# Patient Record
Sex: MALE | Race: WHITE | NOT HISPANIC OR LATINO | Employment: FULL TIME | URBAN - METROPOLITAN AREA
[De-identification: names, ages, dates, MRNs, and addresses within clinical notes are randomized per-mention and may not be internally consistent; named-entity substitution may affect disease eponyms.]

---

## 2023-08-15 PROBLEM — E11.9 DIABETES MELLITUS (MULTI): Status: ACTIVE | Noted: 2023-08-15

## 2023-08-15 PROBLEM — J44.9 CHRONIC OBSTRUCTIVE PULMONARY DISEASE (MULTI): Status: ACTIVE | Noted: 2023-08-15

## 2023-08-15 PROBLEM — G47.33 OSA TREATED WITH BIPAP: Status: ACTIVE | Noted: 2023-08-15

## 2023-08-15 PROBLEM — J45.909 ASTHMA (HHS-HCC): Status: ACTIVE | Noted: 2023-08-15

## 2023-08-15 PROBLEM — S29.9XXA RIB INJURY: Status: ACTIVE | Noted: 2023-08-15

## 2023-08-15 PROBLEM — S22.31XA RIGHT RIB FRACTURE: Status: ACTIVE | Noted: 2023-08-15

## 2023-08-15 PROBLEM — H53.2 DIPLOPIA: Status: ACTIVE | Noted: 2023-08-15

## 2023-08-15 PROBLEM — Z98.84 BARIATRIC SURGERY STATUS: Status: ACTIVE | Noted: 2023-08-15

## 2023-08-15 PROBLEM — H50.10 EXOTROPIA: Status: ACTIVE | Noted: 2023-08-15

## 2023-08-15 PROBLEM — S20.219A CONTUSION, CHEST WALL: Status: ACTIVE | Noted: 2023-08-15

## 2023-08-15 PROBLEM — H52.10 MYOPIA: Status: ACTIVE | Noted: 2023-08-15

## 2023-08-15 PROBLEM — H57.89 OCULAR INFLAMMATION: Status: ACTIVE | Noted: 2023-08-15

## 2023-08-15 PROBLEM — E78.5 HYPERLIPIDEMIA: Status: ACTIVE | Noted: 2023-08-15

## 2023-08-15 PROBLEM — I10 HTN (HYPERTENSION): Status: ACTIVE | Noted: 2023-08-15

## 2023-08-15 RX ORDER — CARBIDOPA AND LEVODOPA 25; 100 MG/1; MG/1
TABLET ORAL
COMMUNITY
Start: 2021-08-26 | End: 2023-10-05 | Stop reason: SDUPTHER

## 2023-08-15 RX ORDER — SILDENAFIL 100 MG/1
100 TABLET, FILM COATED ORAL AS NEEDED
COMMUNITY
Start: 2020-12-22

## 2023-08-15 RX ORDER — TRAZODONE HYDROCHLORIDE 100 MG/1
TABLET ORAL
COMMUNITY
Start: 2020-12-22 | End: 2024-02-19 | Stop reason: WASHOUT

## 2023-08-15 RX ORDER — FUROSEMIDE 40 MG/1
1 TABLET ORAL 2 TIMES DAILY
COMMUNITY
Start: 2018-01-09

## 2023-08-15 RX ORDER — ALBUTEROL SULFATE 90 UG/1
2 AEROSOL, METERED RESPIRATORY (INHALATION) EVERY 4 HOURS PRN
COMMUNITY
Start: 2020-12-03

## 2023-08-15 RX ORDER — ATORVASTATIN CALCIUM 10 MG/1
1 TABLET, FILM COATED ORAL NIGHTLY
COMMUNITY
Start: 2018-01-09

## 2023-08-15 RX ORDER — INSULIN GLARGINE 100 [IU]/ML
INJECTION, SOLUTION SUBCUTANEOUS
COMMUNITY
Start: 2018-01-09 | End: 2023-10-03

## 2023-08-15 RX ORDER — GLIPIZIDE 10 MG/1
1 TABLET ORAL 2 TIMES DAILY
COMMUNITY
Start: 2018-01-09

## 2023-08-15 RX ORDER — CITALOPRAM 40 MG/1
1 TABLET, FILM COATED ORAL DAILY
COMMUNITY
Start: 2018-01-09

## 2023-08-15 RX ORDER — AMLODIPINE BESYLATE 10 MG/1
1 TABLET ORAL DAILY
COMMUNITY
Start: 2018-01-09

## 2023-08-15 RX ORDER — LOSARTAN POTASSIUM 100 MG/1
100 TABLET ORAL DAILY
COMMUNITY
Start: 2020-12-18

## 2023-08-15 RX ORDER — MONTELUKAST SODIUM 10 MG/1
1 TABLET ORAL NIGHTLY
COMMUNITY
Start: 2018-01-09

## 2023-08-15 RX ORDER — FAMOTIDINE 40 MG/1
1 TABLET, FILM COATED ORAL DAILY
COMMUNITY
Start: 2018-01-09

## 2023-08-15 RX ORDER — ATENOLOL 100 MG/1
100 TABLET ORAL DAILY
COMMUNITY
Start: 2021-07-12

## 2023-08-15 RX ORDER — ERGOCALCIFEROL 1.25 MG/1
50000 CAPSULE ORAL
COMMUNITY
Start: 2021-05-17

## 2023-08-15 RX ORDER — FLUTICASONE PROPIONATE AND SALMETEROL 500; 50 UG/1; UG/1
1 POWDER RESPIRATORY (INHALATION)
COMMUNITY
Start: 2018-01-09

## 2023-08-15 RX ORDER — GABAPENTIN 300 MG/1
3 CAPSULE ORAL DAILY
COMMUNITY
Start: 2018-01-09 | End: 2023-10-03

## 2023-08-15 RX ORDER — GABAPENTIN 800 MG/1
800 TABLET ORAL 3 TIMES DAILY
COMMUNITY
Start: 2020-12-22

## 2023-08-15 RX ORDER — METFORMIN HYDROCHLORIDE 1000 MG/1
1 TABLET ORAL 2 TIMES DAILY
COMMUNITY
Start: 2018-01-09

## 2023-08-23 PROBLEM — Z98.84 BARIATRIC SURGERY STATUS: Status: RESOLVED | Noted: 2023-08-15 | Resolved: 2023-08-23

## 2023-08-23 PROBLEM — S29.9XXA RIB INJURY: Status: RESOLVED | Noted: 2023-08-15 | Resolved: 2023-08-23

## 2023-08-23 PROBLEM — R26.9 ABNORMAL GAIT: Status: ACTIVE | Noted: 2023-08-23

## 2023-08-23 PROBLEM — E66.01 CLASS 3 SEVERE OBESITY DUE TO EXCESS CALORIES WITH BODY MASS INDEX (BMI) OF 40.0 TO 44.9 IN ADULT (MULTI): Status: ACTIVE | Noted: 2023-08-23

## 2023-08-23 PROBLEM — G20.A1 PARKINSON DISEASE (MULTI): Status: ACTIVE | Noted: 2023-08-23

## 2023-10-03 ENCOUNTER — OFFICE VISIT (OUTPATIENT)
Dept: NEUROLOGY | Facility: CLINIC | Age: 63
End: 2023-10-03
Payer: COMMERCIAL

## 2023-10-03 VITALS
HEIGHT: 69 IN | HEART RATE: 91 BPM | WEIGHT: 290 LBS | BODY MASS INDEX: 42.95 KG/M2 | DIASTOLIC BLOOD PRESSURE: 72 MMHG | SYSTOLIC BLOOD PRESSURE: 118 MMHG

## 2023-10-03 DIAGNOSIS — G20.A1 PARKINSON'S DISEASE WITHOUT DYSKINESIA OR FLUCTUATING MANIFESTATIONS (MULTI): ICD-10-CM

## 2023-10-03 DIAGNOSIS — R41.89 COGNITIVE IMPAIRMENT: ICD-10-CM

## 2023-10-03 DIAGNOSIS — L21.9 DERMATITIS, SEBORRHEIC: ICD-10-CM

## 2023-10-03 DIAGNOSIS — G47.52 RBD (REM BEHAVIORAL DISORDER): Primary | ICD-10-CM

## 2023-10-03 PROCEDURE — 99205 OFFICE O/P NEW HI 60 MIN: CPT | Performed by: PSYCHIATRY & NEUROLOGY

## 2023-10-03 PROCEDURE — 3078F DIAST BP <80 MM HG: CPT | Performed by: PSYCHIATRY & NEUROLOGY

## 2023-10-03 PROCEDURE — 4010F ACE/ARB THERAPY RXD/TAKEN: CPT | Performed by: PSYCHIATRY & NEUROLOGY

## 2023-10-03 PROCEDURE — 3074F SYST BP LT 130 MM HG: CPT | Performed by: PSYCHIATRY & NEUROLOGY

## 2023-10-03 ASSESSMENT — UNIFIED PARKINSONS DISEASE RATING SCALE (UPDRS)
RIGIDITY_NECK: 2
POSTURAL_STABILITY: 1
POSTURE: 1
PRONATION_SUPINATION_RIGHT: 2
RIGIDITY_RLE: 0
RIGIDITY_LLE: 1
KINETIC_TREMOR_RIGHTHAND: 2
CLINICAL_STATE: ON
FACIAL_EXPRESSION: 1
AMPLITUDE_RUE: 1
GAIT: 2
TOETAPPING_LEFT: 2
CHAIR_RISING_SCALE: 1
HANDMOVEMENTS_RIGHT: 2
KINETIC_TREMOR_LEFTHAND: 2
AMPLITUDE_LIP_JAW: 0
RIGIDITY_RUE: 1
LEVODOPA: YES
AMPLITUDE_RLE: 0
AMPLITUDE_LLE: 0
PRONATION_SUPINATION_LEFT: 1
AMPLITUDE_LUE: 1
POSTURAL_TREMOR_RIGHTHAND: 1
MINUTES_SINCE_LEVODOPA: 240 MINS
RIGIDITY_LUE: 2
LEG_AGILITY_RIGHT: 2
PARKINSONS_MEDS: YES
LEG_AGILITY_LEFT: 2
CONSTANCY_TREMOR_ATREST: 2
SPEECH: 2
FINGER_TAPPING_LEFT: 1
SPONTANEITY_OF_MOVEMENT: 2
FINGER_TAPPING_RIGHT: 1
TOETAPPING_RIGHT: 3
FREEZING_GAIT: 0

## 2023-10-03 ASSESSMENT — MONTREAL COGNITIVE ASSESSMENT (MOCA)
10. [FLUENCY] NAME WORDS STARTING WITH DESIGNATED LETTER: 0
VISUOSPATIAL/EXECUTIVE SUBSCORE: 4
6. READ LIST OF DIGITS [FORWARD/BACKWARD]: 2
9. REPEAT EACH SENTENCE: 2
12. MEMORY INDEX SCORE: 2
WHAT IS THE TOTAL SCORE (OUT OF 30): 25
11. FOR EACH PAIR OF WORDS, WHAT CATEGORY DO THEY BELONG TO (OUT OF 2): 2
5. MEMORY TRIALS: 0
13. ORIENTATION SUBSCORE: 6
4. NAME EACH OF THE THREE ANIMALS SHOWN: 3
7. [VIGILENCE] TAP WHEN HEARING DESIGNATED LETTER: 1
WHAT LEVEL OF EDUCATION WAS ATTAINED: 0
8. SERIAL SUBTRACTION OF 7S: 3

## 2023-10-03 ASSESSMENT — PATIENT HEALTH QUESTIONNAIRE - PHQ9
1. LITTLE INTEREST OR PLEASURE IN DOING THINGS: SEVERAL DAYS
SUM OF ALL RESPONSES TO PHQ9 QUESTIONS 1 AND 2: 1
2. FEELING DOWN, DEPRESSED OR HOPELESS: NOT AT ALL
10. IF YOU CHECKED OFF ANY PROBLEMS, HOW DIFFICULT HAVE THESE PROBLEMS MADE IT FOR YOU TO DO YOUR WORK, TAKE CARE OF THINGS AT HOME, OR GET ALONG WITH OTHER PEOPLE: NOT DIFFICULT AT ALL

## 2023-10-03 ASSESSMENT — ENCOUNTER SYMPTOMS
LOSS OF SENSATION IN FEET: 1
DEPRESSION: 0
OCCASIONAL FEELINGS OF UNSTEADINESS: 1

## 2023-10-03 ASSESSMENT — PAIN SCALES - GENERAL: PAINLEVEL: 0-NO PAIN

## 2023-10-03 NOTE — PATIENT INSTRUCTIONS
It was nice to meet you today.       Plan:  REM sleep behavior disorder - melatonin 5 mg at bedtime. Make sure that bed area is linn  Parkinson;s disease:  continue carbidopa/levodopa at current dose as well as the Carbidopa/levodopa CR at bedtime.   Exercise as much as you can  Memory impairment - I have ordered blood work as well as an MRI of the brain  Neuropathy - continue Gabapentin.   Reduce alcohol intake - please start a mens multivitamin.   RTC in 6 months  Dermatology referral for seborrheic dermatitis.

## 2023-10-03 NOTE — LETTER
Initial Clinical Review    Age/Sex: 68 y o  male admitted on 2/20 for elective surgery - OR    Surgery Date: 2/20    Procedure: S/P TOTAL KNEE ARTHROPLASTY    Anesthesia: Choice    Admission Orders: Date/Time/Statement: Inpatient 2/20/19 @ 1655 Med Surg      Orders Placed This Encounter   Procedures    Inpatient Admission     Standing Status:   Standing     Number of Occurrences:   1     Order Specific Question:   Admitting Physician     Answer:   Vahe Jean [[id number]]     Order Specific Question:   Level of Care     Answer:   Med Surg [[id number]]     Order Specific Question:   Estimated length of stay     Answer:   More than 2 Midnights     Order Specific Question:   Certification     Answer:   I certify that inpatient services are medically necessary for this patient for a duration of greater than two midnights  See H&P and MD Progress Notes for additional information about the patient's course of treatment  Vital Signs: /61   Pulse 72   Temp 100 2 °F (37 9 °C)   Resp 18   Ht 5' 7" (1 702 m)   Wt 69 4 kg (153 lb)   SpO2 98%   BMI 23 96 kg/m²      Diet:        Diet Orders   (From admission, onward)            Start     Ordered    02/20/19 1523  Diet Regular; Regular House  Diet effective now     Question Answer Comment   Diet Type Regular    Regular Regular House    RD to adjust diet per protocol?  Yes        02/20/19 1522        Mobility: Up with assistance  PT eval and treat    DVT Prophylaxis: Sequential compression device    Scheduled Meds:  Current Facility-Administered Medications:  Cefazolin  Intravenous x2   acetaminophen 975 mg Oral Q8H [facility]   docusate sodium 100 mg Oral BID   enoxaparin 40 mg Subcutaneous Daily   gabapentin 100 mg Oral Q8H [facility]   pantoprazole 40 mg Oral Early Morning     Continuous Infusions:  lactated ringers 1 5 mL/kg/hr Last Rate: 1 5 mL/kg/hr (02/21/19 0252)     PRN Meds: calcium carbonate    morphine injection    ondansetron    oxyCODONE po x1 October 3, 2023     NORY Rivas MD  [address] [facility]  [address]  [facility] [address]    Patient: Gilberto Albarran   YOB: 1960   Date of Visit: 10/3/2023       Dear Dr. NORY Rivas MD:    Thank you for referring Gilberto Albarran to me for evaluation. Below are my notes for this consultation.  If you have questions, please do not hesitate to call me. I look forward to following your patient along with you.       Sincerely,     Ann Marie Dewitt MD      CC: No Recipients  ______________________________________________________________________________________    Subjective    Gilberto Albarran is a right handed  62 y.o. year old male who presents with Parkinson's Disease. Patient is accompanied by: spouse  Visit type: new patient visit     Parkinson's Disease    Here for eval of PD.   He developed a hand tremor, and was diagnosed  w PD by Dr Daily.   He was diagnosed 3-4 years ago, but wife thinks he may have had tremors for many many more years.  The tremor occurred both w rest and posture.  Wife notes a resting tremor.  He also has RBD, wife had to move out of the room,   He was started on C/L - it helped tremors and he also thinks he had less vivid dreams.     Meds:  Carbidopa/levodopa 25/100 one tab TID  Carbidopa/levodopa 25/100 CR at bedtime  Kick in: 45 mins, notices improved in tremor.   Wearing off: he does feel some wearing off, not sure exactly how long,   Dyskinesias: denies.     Prior medications:  none      Review of motor symptoms:  Tremor:  Location- bilateral UE                 Rest/postural/action- more so w action and posture, but does happen w rest as well.   Stiffness/rigidity:  Slowness: slow walk, short strides, generalized slowness.   Trouble walking: some gait initiation failure, takes multiple steps to turn. Has festinations.   Freezing of gait: this happens primarily after being seated for a whole.   Balance problems: mild  Falls: fell twice 2 months back.   Changes in  speech: denies - but wife says speech is slower. Mouth quivers.   Swallowing difficulties: denies  Handwriting: squiqqley  Activities of daily living (buttoning clothes, bathing, cutting food, etc): can do most activities, but needs some help putting meds on feet or put socks and shoes on.   Abnormal postures: denies.       Review of nonmotor symptoms:  Cognition:  Memory-  search for words sometimes. Wife says short term memory issues.          Hallucinations- when sitting watching tv -when wakes up - things had a ball of strings etc.  Has also had people walking into this room at night,          Obsessive/compulsive behaviors-  Mood:        He feels he is doing ok.  Often goes out to the bar at night,   Sleep disturbances including REM behavior disorder: + significant RBD  - wife had to move out of bedroom.  He has seen his dead dog.  Saw his wife walk in.   Sensory changes (ie, smell or taste):  taste is reduced. Uses a lot of garlic, thinks his smell sense is slightly reduced.   Constipation: denies  Urinary retention or frequency: denies  Dizziness upon standing: denies  Excessive saliva/drooling: denies  Fatigue: denies    He drinks 2-3 drinks and goes out maybe 3-4 times a week.       Patient Health Questionnaire-2 Score: 1          Review of Systems  All other system have been reviewed and are negative for complaint.  Objective  Seborrheic dermatitis of skin.     Neurological Exam  Mental Status  Awake, alert and oriented to person, place and time.    Cranial Nerves  CN II: Visual fields full to confrontation.  CN III, IV, VI: Extraocular movements intact bilaterally.  CN V: Facial sensation is normal.  CN VII: Full and symmetric facial movement.  CN VIII: Hearing is normal.  CN IX, X: Palate elevates symmetrically  CN XI: Shoulder shrug strength is normal.  CN XII: Tongue midline without atrophy or fasciculations.    Motor  Normal muscle bulk throughout. No fasciculations present. The following abnormal  movements were seen:    Sensory  Light touch is normal in upper and lower extremities.     Reflexes                                            Right                      Left  Brachioradialis                    1+                         1+  Biceps                                 1+                         1+  Triceps                                                        1+  Patellar                                0                         0  Achilles                                0                         0    Coordination  Right: Finger-to-nose normal. Rapid alternating movement normal.Left: Finger-to-nose normal. Rapid alternating movement normal.        MDS UPDRS 1st Score: Motor Examination  Is the patient on medication for treating the symptoms of Parkinson's Disease?: Yes  Patients receiving medication for treating the symptoms of Parkinson's Disease, maggy the patient's clinical state.: On  Is the patient on Levodopa?: Yes  Minutes since last Levodopa dose: 240 mins  Speech: 2  Facial Expression: 1  Rigidty Neck: 2  Rigidty RUE: 1  Rigidity - LUE: 2  Rigidity RLE: 0  Rigidity LLE: 1  Finger Tapping Right Hand: 1  Finger Tapping Left Hand: 1  Hand Movements- Right Hand: 2  Hand Movements- Left Hand: 1  Pronatiaon-Supination Movments - Right Hand: 2  Pronatiaon-Supination Movments Left Hand: 1  Toe Tapping Right Foot: 3  Toe Tapping - Left Foot: 2  Leg Agility - Right Le  Leg Agility - Left le  Arising from Chair: 1  Gait: 2  Freezing of Gait: 0  Postural Stability: 1  Posture: 1  Global Spontanteity of Movment ( Body Bradykinesia): 2  Postural Tremor - Right Hand: 1  Kinetic Tremor - Right hand: 2  Kinetic Tremor - Left hand: 2  Rest Tremor Amplitude - RUE: 1  Rest Tremor Amplitude - LUE: 1  Rest Tremor Amplitude - RLE: 0  Rest Tremor Amplitude - LLE: 0  Rest Tremor Amplitude - Lip/Jaw: 0  Constancy of Rest Tremor: 2    MDS UPDRS 2nd Score:         MOCA Total Score: 25                     Assessment/Plan  Problem List Items Addressed This Visit             ICD-10-CM       Neuro    Parkinson disease G20.A1    Relevant Orders    MR brain wo IV contrast    Vitamin B12    Folate    TSH with reflex to Free T4 if abnormal    Referral to Physical Therapy     Other Visit Diagnoses         Codes    RBD (REM behavioral disorder)    -  Primary G47.52    Cognitive impairment     R41.89    Relevant Orders    MR brain wo IV contrast    Vitamin B12    Folate    TSH with reflex to Free T4 if abnormal    Referral to Physical Therapy    Dermatitis, seborrheic     L21.9    Relevant Orders    Referral to Dermatology            Patient here for transfer of care for PD.   Overall his motoric symptoms are relatively well controlled, however he is having significant gait changes, this may also partly be due to his Peripheral vascular disease as well as peripheral edema, however would definitely benefit from physical therapy as well. MoCA on today;s exam was 25/30.       REM sleep behavior disorder - melatonin 5 mg at bedtime. Make sure that bed area is safe  Parkinson;s disease:  continue carbidopa/levodopa at current dose as well as the Carbidopa/levodopa CR at bedtime.   Exercise as much as you can  Memory impairment - I have ordered blood work as well as an MRI of the brain  Neuropathy - continue Gabapentin.   Reduce alcohol intake - please start a mens multivitamin.   RTC in 6 months  Dermatology referral for seborrheic dermatitis.

## 2023-10-03 NOTE — PROGRESS NOTES
Subjective     Gilberto Albarran is a right handed  62 y.o. year old male who presents with Parkinson's Disease. Patient is accompanied by: spouse  Visit type: new patient visit     Parkinson's Disease    Here for eval of PD.   He developed a hand tremor, and was diagnosed  w PD by Dr Daily.   He was diagnosed 3-4 years ago, but wife thinks he may have had tremors for many many more years.  The tremor occurred both w rest and posture.  Wife notes a resting tremor.  He also has RBD, wife had to move out of the room,   He was started on C/L - it helped tremors and he also thinks he had less vivid dreams.     Meds:  Carbidopa/levodopa 25/100 one tab TID  Carbidopa/levodopa 25/100 CR at bedtime  Kick in: 45 mins, notices improved in tremor.   Wearing off: he does feel some wearing off, not sure exactly how long,   Dyskinesias: denies.     Prior medications:  none      Review of motor symptoms:  Tremor:  Location- bilateral UE                 Rest/postural/action- more so w action and posture, but does happen w rest as well.   Stiffness/rigidity:  Slowness: slow walk, short strides, generalized slowness.   Trouble walking: some gait initiation failure, takes multiple steps to turn. Has festinations.   Freezing of gait: this happens primarily after being seated for a whole.   Balance problems: mild  Falls: fell twice 2 months back.   Changes in speech: denies - but wife says speech is slower. Mouth quivers.   Swallowing difficulties: denies  Handwriting: squiqqley  Activities of daily living (buttoning clothes, bathing, cutting food, etc): can do most activities, but needs some help putting meds on feet or put socks and shoes on.   Abnormal postures: denies.       Review of nonmotor symptoms:  Cognition:  Memory-  search for words sometimes. Wife says short term memory issues.          Hallucinations- when sitting watching tv -when wakes up - things had a ball of strings etc.  Has also had people walking into this room at  night,          Obsessive/compulsive behaviors-  Mood:        He feels he is doing ok.  Often goes out to the bar at night,   Sleep disturbances including REM behavior disorder: + significant RBD  - wife had to move out of bedroom.  He has seen his dead dog.  Saw his wife walk in.   Sensory changes (ie, smell or taste):  taste is reduced. Uses a lot of garlic, thinks his smell sense is slightly reduced.   Constipation: denies  Urinary retention or frequency: denies  Dizziness upon standing: denies  Excessive saliva/drooling: denies  Fatigue: denies    He drinks 2-3 drinks and goes out maybe 3-4 times a week.       Patient Health Questionnaire-2 Score: 1          Review of Systems  All other system have been reviewed and are negative for complaint.  Objective   Seborrheic dermatitis of skin.     Neurological Exam  Mental Status  Awake, alert and oriented to person, place and time.    Cranial Nerves  CN II: Visual fields full to confrontation.  CN III, IV, VI: Extraocular movements intact bilaterally.  CN V: Facial sensation is normal.  CN VII: Full and symmetric facial movement.  CN VIII: Hearing is normal.  CN IX, X: Palate elevates symmetrically  CN XI: Shoulder shrug strength is normal.  CN XII: Tongue midline without atrophy or fasciculations.    Motor  Normal muscle bulk throughout. No fasciculations present. The following abnormal movements were seen:    Sensory  Light touch is normal in upper and lower extremities.     Reflexes                                            Right                      Left  Brachioradialis                    1+                         1+  Biceps                                 1+                         1+  Triceps                                                        1+  Patellar                                0                         0  Achilles                                0                         0    Coordination  Right: Finger-to-nose normal. Rapid alternating movement  normal.Left: Finger-to-nose normal. Rapid alternating movement normal.        MDS UPDRS 1st Score: Motor Examination  Is the patient on medication for treating the symptoms of Parkinson's Disease?: Yes  Patients receiving medication for treating the symptoms of Parkinson's Disease, maggy the patient's clinical state.: On  Is the patient on Levodopa?: Yes  Minutes since last Levodopa dose: 240 mins  Speech: 2  Facial Expression: 1  Rigidty Neck: 2  Rigidty RUE: 1  Rigidity - LUE: 2  Rigidity RLE: 0  Rigidity LLE: 1  Finger Tapping Right Hand: 1  Finger Tapping Left Hand: 1  Hand Movements- Right Hand: 2  Hand Movements- Left Hand: 1  Pronatiaon-Supination Movments - Right Hand: 2  Pronatiaon-Supination Movments Left Hand: 1  Toe Tapping Right Foot: 3  Toe Tapping - Left Foot: 2  Leg Agility - Right Le  Leg Agility - Left le  Arising from Chair: 1  Gait: 2  Freezing of Gait: 0  Postural Stability: 1  Posture: 1  Global Spontanteity of Movment ( Body Bradykinesia): 2  Postural Tremor - Right Hand: 1  Kinetic Tremor - Right hand: 2  Kinetic Tremor - Left hand: 2  Rest Tremor Amplitude - RUE: 1  Rest Tremor Amplitude - LUE: 1  Rest Tremor Amplitude - RLE: 0  Rest Tremor Amplitude - LLE: 0  Rest Tremor Amplitude - Lip/Jaw: 0  Constancy of Rest Tremor: 2    MDS UPDRS 2nd Score:         MOCA Total Score: 25                    Assessment/Plan   Problem List Items Addressed This Visit             ICD-10-CM       Neuro    Parkinson disease G20.A1    Relevant Orders    MR brain wo IV contrast    Vitamin B12    Folate    TSH with reflex to Free T4 if abnormal    Referral to Physical Therapy     Other Visit Diagnoses         Codes    RBD (REM behavioral disorder)    -  Primary G47.52    Cognitive impairment     R41.89    Relevant Orders    MR brain wo IV contrast    Vitamin B12    Folate    TSH with reflex to Free T4 if abnormal    Referral to Physical Therapy    Dermatitis, seborrheic     L21.9    Relevant Orders     Referral to Dermatology            Patient here for transfer of care for PD.   Overall his motoric symptoms are relatively well controlled, however he is having significant gait changes, this may also partly be due to his Peripheral vascular disease as well as peripheral edema, however would definitely benefit from physical therapy as well. MoCA on today;s exam was 25/30.       REM sleep behavior disorder - melatonin 5 mg at bedtime. Make sure that bed area is safe  Parkinson;s disease:  continue carbidopa/levodopa at current dose as well as the Carbidopa/levodopa CR at bedtime.   Exercise as much as you can  Memory impairment - I have ordered blood work as well as an MRI of the brain  Neuropathy - continue Gabapentin.   Reduce alcohol intake - please start a mens multivitamin.   RTC in 6 months  Dermatology referral for seborrheic dermatitis.

## 2023-10-04 ENCOUNTER — OFFICE VISIT (OUTPATIENT)
Dept: WOUND CARE | Facility: CLINIC | Age: 63
End: 2023-10-04
Payer: COMMERCIAL

## 2023-10-04 PROCEDURE — 99212 OFFICE O/P EST SF 10 MIN: CPT

## 2023-10-05 RX ORDER — CARBIDOPA AND LEVODOPA 25; 100 MG/1; MG/1
1 TABLET ORAL 3 TIMES DAILY
Qty: 90 TABLET | Refills: 2 | Status: SHIPPED | OUTPATIENT
Start: 2023-10-05 | End: 2023-12-19

## 2023-10-05 RX ORDER — CARBIDOPA AND LEVODOPA 25; 100 MG/1; MG/1
1 TABLET, EXTENDED RELEASE ORAL NIGHTLY
Qty: 90 TABLET | Refills: 2 | Status: SHIPPED | OUTPATIENT
Start: 2023-10-05 | End: 2023-12-19 | Stop reason: SDUPTHER

## 2023-10-25 ENCOUNTER — LAB (OUTPATIENT)
Dept: LAB | Facility: LAB | Age: 63
End: 2023-10-25
Payer: COMMERCIAL

## 2023-10-25 ENCOUNTER — ANCILLARY PROCEDURE (OUTPATIENT)
Dept: RADIOLOGY | Facility: CLINIC | Age: 63
End: 2023-10-25
Payer: COMMERCIAL

## 2023-10-25 DIAGNOSIS — G20.A1 PARKINSON'S DISEASE WITHOUT DYSKINESIA OR FLUCTUATING MANIFESTATIONS (MULTI): ICD-10-CM

## 2023-10-25 DIAGNOSIS — R41.89 COGNITIVE IMPAIRMENT: ICD-10-CM

## 2023-10-25 LAB
FOLATE SERPL-MCNC: 5.2 NG/ML
TSH SERPL-ACNC: 0.97 MIU/L (ref 0.44–3.98)
VIT B12 SERPL-MCNC: 116 PG/ML (ref 211–911)

## 2023-10-25 PROCEDURE — 70551 MRI BRAIN STEM W/O DYE: CPT

## 2023-10-25 PROCEDURE — 84443 ASSAY THYROID STIM HORMONE: CPT

## 2023-10-25 PROCEDURE — 70551 MRI BRAIN STEM W/O DYE: CPT | Performed by: RADIOLOGY

## 2023-10-25 PROCEDURE — 82746 ASSAY OF FOLIC ACID SERUM: CPT

## 2023-10-25 PROCEDURE — 82607 VITAMIN B-12: CPT

## 2023-10-25 PROCEDURE — 36415 COLL VENOUS BLD VENIPUNCTURE: CPT

## 2023-10-30 ENCOUNTER — TELEPHONE (OUTPATIENT)
Dept: NEUROLOGY | Facility: CLINIC | Age: 63
End: 2023-10-30
Payer: COMMERCIAL

## 2023-10-30 NOTE — TELEPHONE ENCOUNTER
Called Back- No Answer, Left detailed messageCalled Back- No Answer, Left detailed message to make an appt w PCP for low B12 and supplemental shot- followed up with oral supplments- get rechecked w PCP in 6 months.

## 2023-11-03 ENCOUNTER — TELEPHONE (OUTPATIENT)
Dept: NEUROLOGY | Facility: HOSPITAL | Age: 63
End: 2023-11-03
Payer: COMMERCIAL

## 2023-11-03 NOTE — TELEPHONE ENCOUNTER
Called pt back to discuss MRI brain.   No answer, left message to call office back but that no urgent findings

## 2023-11-06 ENCOUNTER — TELEPHONE (OUTPATIENT)
Dept: NEUROLOGY | Facility: CLINIC | Age: 63
End: 2023-11-06
Payer: COMMERCIAL

## 2023-11-09 ENCOUNTER — DOCUMENTATION (OUTPATIENT)
Dept: NEUROLOGY | Facility: HOSPITAL | Age: 63
End: 2023-11-09
Payer: COMMERCIAL

## 2023-11-09 DIAGNOSIS — Q27.9 VASCULAR MALFORMATION (HHS-HCC): Primary | ICD-10-CM

## 2023-11-09 NOTE — PROGRESS NOTES
Called pt back/  Relayed to him that the MRI:  IMPRESSION  * There is no evidence of intracranial mass, cerebral infarction or  hemorrhage. 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   Recommended referral to ENT.   Will place same.

## 2023-11-16 ENCOUNTER — EVALUATION (OUTPATIENT)
Dept: PHYSICAL THERAPY | Facility: CLINIC | Age: 63
End: 2023-11-16
Payer: COMMERCIAL

## 2023-11-16 DIAGNOSIS — G20.A1 PARKINSON'S DISEASE WITHOUT DYSKINESIA OR FLUCTUATING MANIFESTATIONS (MULTI): ICD-10-CM

## 2023-11-16 DIAGNOSIS — R26.89 IMBALANCE: Primary | ICD-10-CM

## 2023-11-16 DIAGNOSIS — R41.89 COGNITIVE IMPAIRMENT: ICD-10-CM

## 2023-11-16 DIAGNOSIS — Z91.81 AT RISK FOR FALLS: ICD-10-CM

## 2023-11-16 PROCEDURE — 97162 PT EVAL MOD COMPLEX 30 MIN: CPT | Mod: GP

## 2023-11-16 PROCEDURE — 97112 NEUROMUSCULAR REEDUCATION: CPT | Mod: GP

## 2023-11-16 ASSESSMENT — ENCOUNTER SYMPTOMS
OCCASIONAL FEELINGS OF UNSTEADINESS: 1
DEPRESSION: 0
LOSS OF SENSATION IN FEET: 1

## 2023-11-16 NOTE — PROGRESS NOTES
Physical Therapy Initial Evaluation:  Vestibular and Balance      Patient Name:  Gilberto Albarran   MRN:  [id number]   Date of Evaluation:  11/16/23   Time Calculation  Start Time: 0817  Stop Time: 0905  Time Calculation (min): 48 min  Visit Number:  1  Insurance Information:  20 COPAY, 1000 DED (MET) 6850 OOP MAX, 30 VS LESLIE YR,NO AUTH     1. Imbalance        2. Parkinson's disease without dyskinesia or fluctuating manifestations  Referral to Physical Therapy    Follow Up In Physical Therapy      3. Cognitive impairment  Referral to Physical Therapy    Follow Up In Physical Therapy      4. At risk for falls            Assessment: Gilberto Albarran is a 63 year old M referred to outpatient PT for Parkinson's related imbalance. Pt demonstrates impairments in sensation, strength, dynamic balance during gait and difficulty w/righting reactions after LOB. Pt subjectively reports a fear of falling d/t past injuries. Pt demonstrates resting, postural and intention tremors, questionable rigidity w/cogwheeling, and bradykinesia during rapid alternative mvmnts and ambulation. These impairments decrease pts ability to perform functional mobility, and puts him at an increased risk of falling. Based on patient's examination, concurrent co-morbidities, and presentation, patient's level of complexity is Moderate.  As a result, recommending continued PT services to facilitate improvement in CLOF.     Problems include:  Activity limitations, Aerobic capacity / endurance, Balance, Decreased functional level, Decreased knowledge of HEP, Fall risk, Gait / locomotion, Motor function / tone , Pain, Participation restrictions, ROM, Sensory, Strength, and Transfers    Goals:  By Discharge patient will demo:  [place] in HEP  No falls during POC  Improvement in the following outcome measures to decrease risk of falls:  Improve 5xSTS <15sec w/out UE  Improve MiniBESS by 5.5pts consistent w/MDC and to decrease risk of falling.   Demonstrate  ability to fwd step onto an 8in step w/out UE support.   Subjective improvement in ability to rise from a chair, and rise from bathroom toilet.  Subjective improvement in ability to step out of a high tub.   Subjective improvement in freezing gait after sitting for prolonged periods of time.       Potential to achieve rehab goals is fair.    Plan:  2 times every week for a total of 10 visits.  Re-assessment after that time.    Activities that may be performed include but are not limited to:  balance, gait, transfers, modalities (as appropriate), e-stim (as appropriate), canalith repositioning maneuvers, therapeutic exercise, therapeutic activities.    Gilberto Albarran in agreement with and understanding of all discussed this date.    ----------------------------------  ----------------------------------    Subjective:    Onset Date:  Diagnosed 3-4 years ago. Had issues for a while, falling for 5-6 years.     HPI:  Gilberto Albarran is a 63 year old M referred to outpatient PT for Parkinson's related imbalance.   Pt reports he has PD, and they want to work on balance and walking. Pt reports he tends to fall a lot, and requires something nearby or requires help from someone to get up from falls.  Pt reports he fell a couple days ago while stepping onto curb. Pt reports difficulty walking over unsteady surfaces. Pt reports additionally having R hip, and bilateral knee pain which he believes is likely d/t arthritis. Pt reports he lives at home w/spouse. 5 steps to get in handrail on R side. House w/second floor and basement stairs. Pt reports significant difficulty w/stairs, L knee occasionally oli, and performs step too pattern to perform. Pt reports no handrail going upstairs, but handrail going down. Pt wife works currently, pt retired for 6 years. Pt reports additional difficulty getting in and out of tub w/out grab bars, as well as getting off toilet and up from chairs. Pt reports approx 2 years ago fell and injured R  "RTC which severely limits ROM and strength. Pt reports requires something nearby or requires help from someone to get up from falls. Pt denies current use of assistive device. Pt reports difficulty standing and walking after sitting for prolonged periods of time.     Patient Goal:  Better balance, stairs if that's possible, just overall better.      (note:  \"y\" = yes; \"n\" = no)      Precautions: Increased risk of falling.     [place] Fall Risk  One or more falls in the last year? Yes  How many Times? 2 or more  Was the patient injured in the fall? Yes  Has trouble stepping onto curb? Yes  Advised to use a cane or walker to get around safely? Yes  Often has to rush to toilet? No  Feels unsteady when walking? Yes  Has lost some feeling in feet? Yes  Often feels sad or depressed? No  Steadies self on furniture while walking at home? Yes  Takes medicine that makes them feel lightheaded or more tired than usual? No  Worried about Falling? Yes  Takes medicine to sleep or improve mood? Yes  Needs to push with hands when rising from a chair? Yes        Pain:  Pt reports bilateral knee pain, and R hip pain. Pt did not quantify.     ----------------------------------  ----------------------------------    OBJECTIVE    Observation:   -Sensation: Impaired bilaterally distally from toes to med/lateral malleoli.     KITTY's:  -Finger Taps: Demonstrates decrease in pace and amplitude  -Pron/Supin: Demonstrates decrease in pace and amplitude   -Heel Taps: Demonstrates decrease in pace and amplitude    -Harish shoulder flexion Isometric hold: Demonstrates postural tremor    ROM:   Rigidity PROM:  -Elbow flex/ext: Bilaterally demonstrates min rigidity w/cogwheeling, questionable d/t pt intermittent difficulty relaxing to allow PROM.    -Knee flex/ext: Bilaterally demonstrates min rigidity w/cogwheeling, questionable d/t pt intermittent difficulty relaxing to allow PROM.        Strength:   LE Strength Screening:   RLE LLE   Hip Flex 4/5 4/5 "   Hip ABD 4/5 4/5   Hip ADD 4/5 4/5   Knee Ext 4/5 4/5   Knee Flex 4-/5 4-/5   Ankle DF 4-/5 4/5   Ankle PF 4+/5 4+/5     Coordination:   -Finger to nose EO: Intention tremor bilaterally R>L  -Alt UE nose taps EC: Demonstrates normal coordination     Functional Mobility Assessment:  - Gait: Pt ambulates w/decreased step length, decrease MAICO, and demonstrates mild unsteadiness.   - Transfers:  Pt demonstrates decreased balance and LE strength increasing difficulty performing STS transfers.   - Bed Mobility: Not observed      Outcomes:  5xSTS: 18sec w/out UE, ABC:  59.4%, and MiniBESS: 18/28 (most difficulty w/dynamic gait tasks, and requiring several steps for righting reactions)    TREATMENT:    Neuromuscular Re-education (97280): 10 minutes  -Discussion of PD related changes and interventions to target difficulties.   -Discussion of importance of aerobic exercise  HEP:  -STS 3x10 daily  -STS w/immediate fwd/bkwd stepping 3x5ea side. Daily   -Standing counter Marching w/large amplitude marching: x10ea side daily  ^educated in pace, performance and purpose. Verbalized understanding.

## 2023-11-21 ENCOUNTER — TREATMENT (OUTPATIENT)
Dept: PHYSICAL THERAPY | Facility: CLINIC | Age: 63
End: 2023-11-21
Payer: COMMERCIAL

## 2023-11-21 DIAGNOSIS — R41.89 COGNITIVE IMPAIRMENT: ICD-10-CM

## 2023-11-21 DIAGNOSIS — G20.A1 PARKINSON'S DISEASE WITHOUT DYSKINESIA OR FLUCTUATING MANIFESTATIONS (MULTI): ICD-10-CM

## 2023-11-21 DIAGNOSIS — Z91.81 AT RISK FOR FALLS: ICD-10-CM

## 2023-11-21 DIAGNOSIS — R26.89 IMBALANCE: Primary | ICD-10-CM

## 2023-11-21 PROCEDURE — 97112 NEUROMUSCULAR REEDUCATION: CPT | Mod: GP

## 2023-11-21 NOTE — PROGRESS NOTES
Physical Therapy Treatment      Patient Name: Gilberto Albarran  MRN: [id number]  Today's Date: 11/21/2023  Visit #: 2  Insurance:  20 COPAY, 1000 DED (MET) 6850 OOP MAX, 30 VS LESLIE YR,NO AUTH     Time Calculation  Start Time: 1032  Stop Time: 1116  Time Calculation (min): 44 min    1. Imbalance        2. Parkinson's disease without dyskinesia or fluctuating manifestations  Follow Up In Physical Therapy      3. Cognitive impairment  Follow Up In Physical Therapy      4. At risk for falls            ASSESSMENT:  Pt highly challenged by movement initiation and unsteadiness during transfers armaan after STS. Pt demonstrates difficulty w/step length, foot clearance and general steadiness during dynamic gait tasks. Lastly, pt highly challenged by step ups armaan w/LLE d/t decreased strength and balance, relying heavily on UE support. Pt to continue to benefit from skilled PT to improve functional mobility and decrease risk of falling.       GOALS:  By Discharge patient will demo:  [place] in HEP  No falls during POC  Improvement in the following outcome measures to decrease risk of falls:  Improve 5xSTS <15sec w/out UE  Improve MiniBESS by 5.5pts consistent w/MDC and to decrease risk of falling.   Demonstrate ability to fwd step onto an 8in step w/out UE support.   Subjective improvement in ability to rise from a chair, and rise from bathroom toilet.  Subjective improvement in ability to step out of a high tub.   Subjective improvement in freezing gait after sitting for prolonged periods of time.     PLAN:  Cont to progress LE strength/NM control, dynamic balance, and movement initiation after STS as tolerated.    -Add resisted TKE  -Try step ups on 2in step for LLE.       SUBJECTIVE:  Pt reports if he has been sitting for 15min or more he continues to have significant difficulty taking a step right away. Pt reports other exercises have been going well.     OBJECTIVE:  TREATMENT:  Neuromuscular Re-education (49472): 44  minutes  TUG trials through fig 8 cones. For LE transfer strength, and to work on step initiation    ^performed to fatigue - approx 5 reps  STS w/immediate stepping to cone for visual cue, - to work on step initiation and balance during transfers - 5x w/ea LE step, pt highly challenged and experiencing knee pain/fatigue by end of repetitions.  4 Hurdles layed flat, to improve step length and foot clearance to translate to functional mobility, step too pattern - x3ea side.   4 Hurdles layed flat, to improve step length and foot clearance to translate to functional mobility, reciprocal pattern - to fatigue (approx 5)  Step ups on 4in step RLE w/out UE support to challenge balance and NM re-ed of LE  Step ups on 4in step LLE for NM re-ed and LE stability   ^1st set w/bilateral UE support, pt relies heavily on UEs. - x8   ^2nd set w/bilateral UE support ascend, RUE only descend x3, stopped d/t knee buckling.   -LAQ LLE 3-5sec isometric hold, for NM re-ed of quads and LE strength - x8     Discussion of HEP and added to HEP:  -Discussion of adding visual cue for fwd step initiation exercise.   -Discussion of performing marching slowly.  -Added LAQ 3-5sec isometric hold 3x10 daily.

## 2023-11-28 ENCOUNTER — TREATMENT (OUTPATIENT)
Dept: PHYSICAL THERAPY | Facility: CLINIC | Age: 63
End: 2023-11-28
Payer: COMMERCIAL

## 2023-11-28 DIAGNOSIS — R41.89 COGNITIVE IMPAIRMENT: ICD-10-CM

## 2023-11-28 DIAGNOSIS — G20.A1 PARKINSON'S DISEASE WITHOUT DYSKINESIA OR FLUCTUATING MANIFESTATIONS (MULTI): ICD-10-CM

## 2023-11-28 DIAGNOSIS — Z91.81 AT RISK FOR FALLS: ICD-10-CM

## 2023-11-28 DIAGNOSIS — R26.89 IMBALANCE: Primary | ICD-10-CM

## 2023-11-28 PROCEDURE — 97112 NEUROMUSCULAR REEDUCATION: CPT | Mod: GP,CQ

## 2023-11-28 NOTE — PROGRESS NOTES
Physical Therapy Treatment      Patient Name: Gilberto Albarran  MRN: [id number]  Today's Date: 11/28/2023  Visit #: 3  Insurance:  20 COPAY, 1000 DED (MET) 6850 OOP MAX, 30 VS LESLIE YR,NO AUTH     Time Calculation  Start Time: 1519  Stop Time: 1600  Time Calculation (min): 41 min    1. Imbalance        2. Parkinson's disease without dyskinesia or fluctuating manifestations  Follow Up In Physical Therapy      3. Cognitive impairment  Follow Up In Physical Therapy      4. At risk for falls              ASSESSMENT:  Pt tolerated session well with focus on dynamic balance and strengthening especially left LE.  Pt. Did well with 2 inch step ups with some UE support needed.  Fatigued with walking 240' especially noted in left LE.  Pt. Reported fatigue in legs post session.  Pt. Motivated with plan of care and will con't  to benefit from skilled PT to improve functional mobility and decrease risk of falling.       GOALS:  By Discharge patient will demo:  [place] in HEP  No falls during POC  Improvement in the following outcome measures to decrease risk of falls:  Improve 5xSTS <15sec w/out UE  Improve MiniBESS by 5.5pts consistent w/MDC and to decrease risk of falling.   Demonstrate ability to fwd step onto an 8in step w/out UE support.   Subjective improvement in ability to rise from a chair, and rise from bathroom toilet.  Subjective improvement in ability to step out of a high tub.   Subjective improvement in freezing gait after sitting for prolonged periods of time.     PLAN:  Cont to progress LE strength/NM control, dynamic balance, and movement initiation after STS as tolerated.      SUBJECTIVE:  Pt reports compliance with HEP. Voices no complaints.    OBJECTIVE:  TREATMENT:  Neuromuscular Re-education (87245): 41 minutes  - reviewed and performed LAQ LLE 3-5sec isometric hold, for NM re-ed of quads and LE strength - x 10 reps both le's.  -sit to stands x 10 reps without UE support.  -ambulated on the track 240',  required to sit at this time due to fatigue and left knee discomfort.   -forward stepping to target x 10 reps with intermittent UE support.  -heel/toe rocking with arm swing   -2 inch forward step ups both LE's x 10 reps  -TKE with ball against wall with reps to tolerance left LE.

## 2023-12-04 ENCOUNTER — TREATMENT (OUTPATIENT)
Dept: PHYSICAL THERAPY | Facility: CLINIC | Age: 63
End: 2023-12-04
Payer: COMMERCIAL

## 2023-12-04 DIAGNOSIS — G20.A1 PARKINSON'S DISEASE WITHOUT DYSKINESIA OR FLUCTUATING MANIFESTATIONS (MULTI): ICD-10-CM

## 2023-12-04 DIAGNOSIS — R26.89 IMBALANCE: Primary | ICD-10-CM

## 2023-12-04 DIAGNOSIS — R41.89 COGNITIVE IMPAIRMENT: ICD-10-CM

## 2023-12-04 DIAGNOSIS — Z91.81 AT RISK FOR FALLS: ICD-10-CM

## 2023-12-04 PROCEDURE — 97112 NEUROMUSCULAR REEDUCATION: CPT | Mod: GP | Performed by: PHYSICAL THERAPIST

## 2023-12-04 NOTE — PROGRESS NOTES
"Physical Therapy Treatment      Patient Name: Gilberto Albarran  MRN: [id number]  Today's Date: 12/4/2023  Visit #: 4  Insurance:  20 COPAY, 1000 DED (MET) 6850 OOP MAX, 30 VS LESLIE YR,NO AUTH     Time Calculation  Start Time: 1026  Stop Time: 1046  Time Calculation (min): 20 min    1. Imbalance        2. Parkinson's disease without dyskinesia or fluctuating manifestations  Follow Up In Physical Therapy      3. Cognitive impairment  Follow Up In Physical Therapy      4. At risk for falls          ASSESSMENT:  Patient arrived to clinic ~25 min late leading to abbreviated session. Patient tolerated session well w/ only minor LOB consistent w/ balance training performed. Patient w/ good activation of quadriceps musculature and no evidence of L knee instability throughout any intervention. Able to progress step-up exercise to 4\" box w/ no difficulty and no LOB noted. Patient w/ min sway during dynamic balance challenge, able to self-correct LOB requiring intermittent UE support. Some stepping strategies noted, although not of adequate amplitude or speed to facilitate true recovery of postural instability. At this time, patient continues to require skilled PT services to address balance, strength, and neuromuscular impairments to reduce fall risk and promote return to PLOF.     GOALS:  By Discharge patient will demo:  [place] in HEP  No falls during POC  Improvement in the following outcome measures to decrease risk of falls:  Improve 5xSTS <15sec w/out UE  Improve MiniBESS by 5.5pts consistent w/MDC and to decrease risk of falling.   Demonstrate ability to fwd step onto an 8in step w/out UE support.   Subjective improvement in ability to rise from a chair, and rise from bathroom toilet.  Subjective improvement in ability to step out of a high tub.   Subjective improvement in freezing gait after sitting for prolonged periods of time.     PLAN:  Cont to progress LE strength/NM control, dynamic balance, and movement " "initiation after STS as tolerated.      SUBJECTIVE:  Patient arrives to the clinic w/ reports of mild B knee pain and compliance w/ HEP. Patient reports that sit <> stand exercise at home is still challenging, but the other ones are going okay. Patient is w/out questions or concerns.     OBJECTIVE:  TREATMENT:  Neuromuscular Re-education (27050): 19 minutes  -Standing TKE w/ ball at wall (L), 10 x 5s hold   -FOR step-ups to 2\" box, x 10 L LE, CGA at gait belt  -FOR step-ups to 4\" box, x 8 L LE, CGA at gait belt - requested to stop intervention d/t popping in knee and \"knowing something is going to happen\"  -FOR/BACK stepping over 1\" sandi, no UE support, x 10 R/L, CGA at gait belt - to challenge amplitude, dynamic balance, and righting reactions  -LAT stepping over 1\" sandi, no UE support, x 10 R/L, CGA at gait belt - to challenge amplitude, dynamic balance, and righting reactions  "

## 2023-12-06 ENCOUNTER — TREATMENT (OUTPATIENT)
Dept: PHYSICAL THERAPY | Facility: CLINIC | Age: 63
End: 2023-12-06
Payer: COMMERCIAL

## 2023-12-06 DIAGNOSIS — Z91.81 AT RISK FOR FALLS: ICD-10-CM

## 2023-12-06 DIAGNOSIS — R26.89 IMBALANCE: Primary | ICD-10-CM

## 2023-12-06 DIAGNOSIS — G20.A1 PARKINSON'S DISEASE WITHOUT DYSKINESIA OR FLUCTUATING MANIFESTATIONS (MULTI): ICD-10-CM

## 2023-12-06 DIAGNOSIS — R41.89 COGNITIVE IMPAIRMENT: ICD-10-CM

## 2023-12-06 PROCEDURE — 97110 THERAPEUTIC EXERCISES: CPT | Mod: GP | Performed by: PHYSICAL THERAPIST

## 2023-12-06 PROCEDURE — 97112 NEUROMUSCULAR REEDUCATION: CPT | Mod: GP | Performed by: PHYSICAL THERAPIST

## 2023-12-06 NOTE — PROGRESS NOTES
Physical Therapy Treatment      Patient Name: Gilberto Albarran  MRN: [id number]  Today's Date: 12/6/2023  Visit #: 5  Insurance:  20 COPAY, 1000 DED (MET) 6850 OOP MAX, 30 VS LESLIE YR,NO AUTH     Time Calculation  Start Time: 1024  Stop Time: 1103  Time Calculation (min): 39 min    1. Imbalance        2. Parkinson's disease without dyskinesia or fluctuating manifestations  Follow Up In Physical Therapy      3. Cognitive impairment  Follow Up In Physical Therapy      4. At risk for falls            ASSESSMENT:  Patient tolerated session well and was w/out instances of L knee instability throughout all interventions, even step-up/down interventions. Patient demonstrated improvements with step-up intervention - able to complete 2 full sets of 10 w/out UE support and only minimal fatigue. Patient also demonstrated improved dynamic stability, notably w/ sandi intervention, and was able to complete w/ intermittent UE support only and min-mod sway. Overall, patient appears to be progressing in the right direction. At this time, patient continues to require skilled PT services to address remaining impairments in dynamic balance, righting reactions, gait, and functional mobility to reduce fall risk and promote return to PLOF.     GOALS:  By Discharge patient will demo:  [place] in HEP  No falls during POC  Improvement in the following outcome measures to decrease risk of falls:  Improve 5xSTS <15sec w/out UE  Improve MiniBESS by 5.5pts consistent w/ MDC and to decrease risk of falling.   Demonstrate ability to fwd step onto an 8in step w/out UE support.   Subjective improvement in ability to rise from a chair, and rise from bathroom toilet.  Subjective improvement in ability to step out of a high tub.   Subjective improvement in freezing gait after sitting for prolonged periods of time.     PLAN:  Cont to progress LE strength/NM control, dynamic balance, and movement initiation after STS as tolerated.   "    SUBJECTIVE:  Patient arrives to the clinic w/ reports of tightness and pain in the knees. Patient states that he is attempting FOR step-up exercises on cinder block at home.     OBJECTIVE:  TREATMENT:  Therapeutic Exercise (9710) 23 minutes  -FOR step-ups to 4\" box, 2 x 10 L LE, CGA at gait belt, no UE support - for quadriceps strength, mass practice of stepping motion, and dynamic balance, much improved performance and able to stabilize independently  -FOR step-downs from 2\" box, x 15 L LE, CGA at gait belt, intermittent UE support - for quadriceps strength, mass practice of stepping motion, and dynamic balance, no observable knee instability, able to stabilize independently throughout intervention  -Education: Discussed importance of heel strike during ambulation to elongate step and decrease likelihood of shuffling. Discussed rationale behind step-up/down interventions in the context of functional quad strength to decrease instances of knee buckling.    Neuromuscular Re-education (06946): 15 minutes   -4\" sandi negotiation, x 6 FOR, reciprocal pattern - for challenging foot clearance, SL stability, and dynamic balance, able to complete last 2 rounds w/out UE support and min - mod sway, no true LOB noted   -Figure 8's through cones, x 6 rounds, VC to strike heel first - to challenge turning stability and dynamic balance   "

## 2023-12-11 ENCOUNTER — TREATMENT (OUTPATIENT)
Dept: PHYSICAL THERAPY | Facility: CLINIC | Age: 63
End: 2023-12-11
Payer: COMMERCIAL

## 2023-12-11 DIAGNOSIS — R41.89 COGNITIVE IMPAIRMENT: ICD-10-CM

## 2023-12-11 DIAGNOSIS — G20.A1 PARKINSON'S DISEASE WITHOUT DYSKINESIA OR FLUCTUATING MANIFESTATIONS (MULTI): ICD-10-CM

## 2023-12-11 DIAGNOSIS — R26.89 IMBALANCE: Primary | ICD-10-CM

## 2023-12-11 DIAGNOSIS — Z91.81 AT RISK FOR FALLS: ICD-10-CM

## 2023-12-11 PROCEDURE — 97112 NEUROMUSCULAR REEDUCATION: CPT | Mod: GP | Performed by: PHYSICAL THERAPIST

## 2023-12-11 NOTE — PROGRESS NOTES
"Physical Therapy Treatment      Patient Name: Gilberto Albarran  MRN: [id number]  Today's Date: 12/11/2023  Visit #: 6  Insurance:  20 COPAY, 1000 DED (MET) 6850 OOP MAX, 30 VS LESLIE YR,NO AUTH     Time Calculation  Start Time: 1027  Stop Time: 1100  Time Calculation (min): 33 min    1. Imbalance        2. Parkinson's disease without dyskinesia or fluctuating manifestations  Follow Up In Physical Therapy      3. Cognitive impairment  Follow Up In Physical Therapy      4. At risk for falls          ASSESSMENT:  Patient arrived late to clinic by ~11 minutes leading to abbreviated session. Patient demonstrated improved turning stability and stability during reciprocal movements. Patient challenged by coordination and balance aspects of agility ladder which improved w/ repetition. Patient required increased rest breaks throughout session d/t recovering asthma attack. At this time, patient continues to require skilled PT services to address impairments in dynamic balance, gait, and LE strength to promote return to PLOF.     GOALS:  By Discharge patient will demo:  [place] in HEP  No falls during POC  Improvement in the following outcome measures to decrease risk of falls:  Improve 5xSTS <15sec w/out UE  Improve MiniBESS by 5.5pts consistent w/ MDC and to decrease risk of falling.   Demonstrate ability to fwd step onto an 8in step w/out UE support.   Subjective improvement in ability to rise from a chair, and rise from bathroom toilet.  Subjective improvement in ability to step out of a high tub.   Subjective improvement in freezing gait after sitting for prolonged periods of time.     PLAN:  Cont to progress LE strength/NM control, dynamic balance, and movement initiation after STS as tolerated.      SUBJECTIVE:  Patient arrives to the clinic reporting \"knees are shot\" from increased activity over the weekend, stating that they are painful and tender. Also states that he is recovering from an asthma attack. Reports " "non-compliance w/ HEP since last session.     OBJECTIVE:  TREATMENT:  Neuromuscular Re-education (38554): 30 minutes   -4\" sandi negotiation, x 2 FOR, reciprocal pattern, intermittent UE support - for challenging foot clearance, SL stability, and dynamic balance, min sway, stepping reactions noted  -Figure 8's through cones, x 5 rounds R/L, VC to strike heel first - to challenge turning stability and dynamic balance   -Agility ladder:   -Reciprocal pattern, 1 foot ea box, x 6 rounds   -FOR step-in, posterolateral step outside next box, x 1 round R/L - CGA   -2 steps FOR, 1 step BACK, x 3 rounds - CGA - min A, challenged w/ coordination, improved w/ repetition  "

## 2023-12-14 ENCOUNTER — TREATMENT (OUTPATIENT)
Dept: PHYSICAL THERAPY | Facility: CLINIC | Age: 63
End: 2023-12-14
Payer: COMMERCIAL

## 2023-12-14 DIAGNOSIS — R26.89 IMBALANCE: Primary | ICD-10-CM

## 2023-12-14 DIAGNOSIS — G20.A1 PARKINSON'S DISEASE WITHOUT DYSKINESIA OR FLUCTUATING MANIFESTATIONS (MULTI): ICD-10-CM

## 2023-12-14 DIAGNOSIS — R41.89 COGNITIVE IMPAIRMENT: ICD-10-CM

## 2023-12-14 DIAGNOSIS — Z91.81 AT RISK FOR FALLS: ICD-10-CM

## 2023-12-14 PROCEDURE — 97112 NEUROMUSCULAR REEDUCATION: CPT | Mod: GP

## 2023-12-14 NOTE — PROGRESS NOTES
"Physical Therapy Treatment      Patient Name: Gilberto Albarran  MRN: [id number]  Today's Date: 12/14/2023  Visit #: 7  Insurance:  20 COPAY, 1000 DED (MET) 6850 OOP MAX, 30 VS LESLIE YR,NO AUTH     Time Calculation  Start Time: 1022  Stop Time: 1103  Time Calculation (min): 41 min    1. Imbalance  Follow Up In Physical Therapy      2. At risk for falls  Follow Up In Physical Therapy      3. Parkinson's disease without dyskinesia or fluctuating manifestations  Follow Up In Physical Therapy    Follow Up In Physical Therapy      4. Cognitive impairment  Follow Up In Physical Therapy    Follow Up In Physical Therapy          ASSESSMENT:  Session focused on addressing \"big\" movements and concerns of freezing.  Added to HEP for seated activities prior to standing.  Challenged by activities oftentimes because of L knee.    GOALS:  By Discharge patient will demo:  [place] in HEP  No falls during POC  Improvement in the following outcome measures to decrease risk of falls:  Improve 5xSTS <15sec w/out UE  Improve MiniBESS by 5.5pts consistent w/ MDC and to decrease risk of falling.   Demonstrate ability to fwd step onto an 8in step w/out UE support.   Subjective improvement in ability to rise from a chair, and rise from bathroom toilet.  Subjective improvement in ability to step out of a high tub.   Subjective improvement in freezing gait after sitting for prolonged periods of time.     PLAN:  Cont to progress LE strength/NM control, dynamic balance, and movement initiation after STS as tolerated.      SUBJECTIVE:  Things have been \"not bad.\"  Been practicing activities at home.  Getting up from low surfaces still a problem and so are stairs.  Been leading with L LE with stairs.  Doing better with getting over a tub.  Walking is doing a lot better.  Takes 20-30 seconds to initiate steps.  Fell yesterday when making cookies.  Was taking a pan of cookies out of the stove and didn't lift foot high enough because couldn't see the " gate.    OBJECTIVE:  TREATMENT:  Neuromuscular Re-education (04004): 41 minutes   LSVT Big Seated Activities -- given rotation and bending forward for HEP to be performed.  STS, FWD 4 Steps, BWD 4 Steps -- performing x 8, challenged by performance.  Aerobic Step Activities (superset)  - Side Stepping Over and Back -- x 8  - Alternating Foot Taps -- x 20  ^^ performing x 2 sets.

## 2023-12-18 ENCOUNTER — DOCUMENTATION (OUTPATIENT)
Dept: PHYSICAL THERAPY | Facility: CLINIC | Age: 63
End: 2023-12-18
Payer: COMMERCIAL

## 2023-12-18 DIAGNOSIS — G20.A1 PARKINSON'S DISEASE WITHOUT DYSKINESIA OR FLUCTUATING MANIFESTATIONS (MULTI): ICD-10-CM

## 2023-12-18 NOTE — PROGRESS NOTES
Physical Therapy                 Therapy Communication Note    Patient Name: Gilberto Albarran  MRN: [id number]  Today's Date: 12/18/2023     Discipline: Physical Therapy    Missed Visit Reason:  Unknown.    Missed Time: No Show    Comment: N/a

## 2023-12-19 DIAGNOSIS — G20.A1 PARKINSON'S DISEASE WITHOUT DYSKINESIA OR FLUCTUATING MANIFESTATIONS (MULTI): ICD-10-CM

## 2023-12-19 RX ORDER — CARBIDOPA AND LEVODOPA 25; 100 MG/1; MG/1
1 TABLET ORAL 3 TIMES DAILY
Qty: 270 TABLET | Refills: 3 | Status: SHIPPED | OUTPATIENT
Start: 2023-12-19 | End: 2023-12-26

## 2023-12-19 RX ORDER — CARBIDOPA AND LEVODOPA 25; 100 MG/1; MG/1
1 TABLET, EXTENDED RELEASE ORAL NIGHTLY
Qty: 90 TABLET | Refills: 3 | Status: SHIPPED | OUTPATIENT
Start: 2023-12-19 | End: 2024-04-07

## 2023-12-20 ENCOUNTER — TREATMENT (OUTPATIENT)
Dept: PHYSICAL THERAPY | Facility: CLINIC | Age: 63
End: 2023-12-20
Payer: COMMERCIAL

## 2023-12-20 DIAGNOSIS — G20.A1 PARKINSON'S DISEASE WITHOUT DYSKINESIA OR FLUCTUATING MANIFESTATIONS (MULTI): ICD-10-CM

## 2023-12-20 DIAGNOSIS — R41.89 COGNITIVE IMPAIRMENT: ICD-10-CM

## 2023-12-20 DIAGNOSIS — R26.89 IMBALANCE: Primary | ICD-10-CM

## 2023-12-20 DIAGNOSIS — Z91.81 AT RISK FOR FALLS: ICD-10-CM

## 2023-12-20 PROCEDURE — 97112 NEUROMUSCULAR REEDUCATION: CPT | Mod: GP | Performed by: PHYSICAL THERAPIST

## 2023-12-20 ASSESSMENT — PAIN SCALES - GENERAL: PAINLEVEL_OUTOF10: 3

## 2023-12-20 ASSESSMENT — PAIN - FUNCTIONAL ASSESSMENT: PAIN_FUNCTIONAL_ASSESSMENT: 0-10

## 2023-12-20 NOTE — PROGRESS NOTES
"Physical Therapy Treatment      Patient Name: Gilberto Albarran  MRN: [id number]  Today's Date: 12/20/2023  Visit #: 7  Insurance:  20 COPAY, 1000 DED (MET) 6850 OOP MAX, 30 VS LESLIE YR,NO AUTH     Time Calculation  Start Time: 1025  Stop Time: 1100  Time Calculation (min): 35 min    1. Imbalance        2. Parkinson's disease without dyskinesia or fluctuating manifestations  Follow Up In Physical Therapy      3. Cognitive impairment  Follow Up In Physical Therapy      4. At risk for falls            ASSESSMENT:  Patient arrived to the clinic ~10 minutes late leading to abbreviated session. Patient tolerated step-up intervention well, but did have pain associated w/ movement. Patient was able to achieve 8\" step w/ light UE support which is an improvement from previous session. Also improved stability upon achieving standing position after targeted intervention. Patient stated he's been feeling less stability upon standing which is why he takes some time to initiate steps vs experiencing freezing episode. At this time, patient continues to be challenged by dynamic balance, LE strength, and initiating movement upon standing which necessitates ongoing PT services to reduce fall risk and promote return to PLOF.     GOALS:  By Discharge patient will demo:  [place] in HEP  No falls during POC  Improvement in the following outcome measures to decrease risk of falls:  Improve 5xSTS <15sec w/out UE  Improve MiniBESS by 5.5pts consistent w/ MDC and to decrease risk of falling.   Demonstrate ability to fwd step onto an 8in step w/out UE support.   Subjective improvement in ability to rise from a chair, and rise from bathroom toilet.  Subjective improvement in ability to step out of a high tub.   Subjective improvement in freezing gait after sitting for prolonged periods of time.     PLAN:  Cont to progress LE strength/NM control, dynamic balance, and movement initiation after STS as tolerated.      SUBJECTIVE:  Patient arrives to " "the clinic w/ reports of feeling okay today. States he missed last appt because daughter needed help at home and he left his phone. Reports that new exercises are going well at home and is w/out questions or concerns.     OBJECTIVE:  TREATMENT:  Neuromuscular Re-education (45432): 30 minutes   -STS w/ LE's on Airex, x 8, no UE support - to challenge stability upon transfer to incr subjective stability before taking 1st step  -Target stepping to ANT/LAT/POST targets + UE movement, x 2 min - challenged posteriorly   -FOR step-ups to 6\" step w/out UE support, x 10 R/L  -FOR step-ups 8\" step, x 8 R LE w/ no UE support, x 6 L LE w/ fingertip support  "

## 2023-12-24 DIAGNOSIS — G20.A1 PARKINSON'S DISEASE WITHOUT DYSKINESIA OR FLUCTUATING MANIFESTATIONS (MULTI): ICD-10-CM

## 2023-12-26 RX ORDER — CARBIDOPA AND LEVODOPA 25; 100 MG/1; MG/1
1 TABLET ORAL 3 TIMES DAILY
Qty: 90 TABLET | Refills: 10 | Status: SHIPPED | OUTPATIENT
Start: 2023-12-26 | End: 2024-04-07

## 2023-12-27 ENCOUNTER — TREATMENT (OUTPATIENT)
Dept: PHYSICAL THERAPY | Facility: CLINIC | Age: 63
End: 2023-12-27
Payer: COMMERCIAL

## 2023-12-27 DIAGNOSIS — R26.89 IMBALANCE: Primary | ICD-10-CM

## 2023-12-27 DIAGNOSIS — G20.A1 PARKINSON'S DISEASE WITHOUT DYSKINESIA OR FLUCTUATING MANIFESTATIONS (MULTI): ICD-10-CM

## 2023-12-27 DIAGNOSIS — Z91.81 AT RISK FOR FALLS: ICD-10-CM

## 2023-12-27 DIAGNOSIS — R41.89 COGNITIVE IMPAIRMENT: ICD-10-CM

## 2023-12-27 PROCEDURE — 97112 NEUROMUSCULAR REEDUCATION: CPT | Mod: GP

## 2023-12-27 NOTE — PROGRESS NOTES
Physical Therapy    Patient Name: Gilberto Albarran  MRN: [id number]  Today's Date: 12/27/2023  Time Calculation  Start Time: 1032  Stop Time: 1115  Time Calculation (min): 43 min    Insurance reviewed   Visit number: 8   20 COPAY, 1000 DED (MET) 6850 OOP MAX, 30 VS LESLIE YR,NO AUTH      Assessment:  Session focused on improving SLS stability. Pt challenged with prolonged SLS requiring up to CGA-min assist to maintain stability, even with UE support. Pt fatigues quickly however responds well to therapeutic rest. No increase of L LE pain with activity. Pt to benefit from continued skilled PT services to further progress balance, gait mechanics and overall mobility      Plan;  Continue to progress  -Static balance  -Dynamic balance  -Gait mechanics    Current Problem:   1. Imbalance        2. Parkinson's disease without dyskinesia or fluctuating manifestations  Follow Up In Physical Therapy      3. Cognitive impairment  Follow Up In Physical Therapy      4. At risk for falls            Subjective   Pt reports to PT ambulatory without a device. No medical changes. No falls. Pt reports after last session, decreased ability to get out of a chair even through the weekend. Pt is feeling better at this time. L knee still in some pain.     Treatments:    Neuromuscular Re-education (20137): timed minutes 38, units 3 .  Dynamic warm up length of hallway (12 ft); side stepping each direction x 2 , backward amb x 2, high stepping x 2    To improve SLS stability  -Standing on foam surface completing toe taps in semi circular formation; 2 x 2 minutes  -Stepping forward/backward over threshold; 2 x 20 each LE, first trial with U UE, second trial without UE support  -Four way hip drills; 2 x 8 each side, U UE support  -SLS hold on flat ground with limited UE support; 2 x 30 seconds, able to hold ~ 15 seconds before tapping contralateral LE to floor    Education and discussion on HEP and treatment regarding the benefits related to current  condition, POC, pathophysiology, and precautions    Goals;   By Discharge patient will demo:  [place] in HEP  No falls during POC  Improvement in the following outcome measures to decrease risk of falls:  Improve 5xSTS <15sec w/out UE  Improve MiniBESS by 5.5pts consistent w/MDC and to decrease risk of falling.   Demonstrate ability to fwd step onto an 8in step w/out UE support.   Subjective improvement in ability to rise from a chair, and rise from bathroom toilet.  Subjective improvement in ability to step out of a high tub.   Subjective improvement in freezing gait after sitting for prolonged periods of time.

## 2023-12-29 ENCOUNTER — APPOINTMENT (OUTPATIENT)
Dept: PHYSICAL THERAPY | Facility: CLINIC | Age: 63
End: 2023-12-29
Payer: COMMERCIAL

## 2024-01-02 PROBLEM — M95.0 NASAL VALVE COLLAPSE: Status: ACTIVE | Noted: 2024-01-02

## 2024-01-02 PROBLEM — R06.89 DIFFICULTY BREATHING: Status: ACTIVE | Noted: 2024-01-02

## 2024-01-02 PROBLEM — J34.3 HYPERTROPHY OF NASAL TURBINATES: Status: ACTIVE | Noted: 2024-01-02

## 2024-01-02 PROBLEM — J34.2 DEVIATED SEPTUM: Status: ACTIVE | Noted: 2024-01-02

## 2024-01-02 NOTE — PROGRESS NOTES
Chief Complaint: Left temporalis vascular malformation    Referring Provider: Dr. Ann Marie Dewitt, Neurology    Patient is a 64yo Male who developed a hand tremor, and was diagnosed  with Parkinson's Disease 3-4 years ago, but wife thinks he may have had tremors for many more years.  Referred to our office by Neurology following  recent MRI report: Incidental note is made of a soft tissue mass or fluid collection in the left temporal fossa with extension to the temporalis muscle that is ill-defined and associated with enlarged venous structures. Soft tissue hemangioma should be considered.     10/25/23: MRI Brain      FINDINGS:  There is slight prominence of the cortical sulci and sylvian  fissures. There is mild ventricular dilatation.  There are a few  scattered foci of abnormal signal within the basal ganglia and  subcortical white matter bilaterally.  These are compatible with  minimal small vessel ischemic changes.  These nonspecific findings  could also be produced by a demyelinating or post inflammatory  process.  The visualized skull base paranasal sinuses and orbital  structures are unremarkable. Diffusion weighted images and associated  ADC maps of the brain were unremarkable.  There is no evidence of  diffusion restriction to suggest the presence of acute infarction.  Gradient echo T2 weighted images fail to demonstrate hemosiderin  deposition or other evidence of hemorrhage.      Incidental note is made of a soft tissue mass or fluid collection in  the left temporal fossa with extension to the temporalis muscle that  is ill-defined and associated with enlarged venous structures. Soft  tissue hemangioma should be considered.      IMPRESSION  * There is no evidence of intracranial mass, cerebral infarction or  hemorrhage. 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    Location: ***  Quality: ***  Severity: ***  Duration: ***  Timing: ***  Context: ***  Modifying factors:  ***  Associated signs and  symptoms: ***    Past Medical History  He has a past medical history of Chronic obstructive pulmonary disease, unspecified (CMS/[facility]) (01/20/2022), Diplopia (12/04/2020), Essential (primary) hypertension, Hyperlipidemia, unspecified, Type 2 diabetes mellitus without complications (CMS/[facility]) (01/20/2022), and Unspecified exotropia (12/04/2020).    Surgical History  He has a past surgical history that includes Other surgical history (09/04/2020).     Social History  He reports that he has quit smoking. His smoking use included cigarettes. He smoked an average of 1 pack per day. He does not have any smokeless tobacco history on file. He reports current alcohol use of about 2.0 standard drinks of alcohol per week. He reports that he does not use drugs.    Family History  Family History   Problem Relation Name Age of Onset    Cataracts Mother          Allergies  Patient has no known allergies.    Past, family, and social history obtained but not pertinent to current problem unless documented above.    All other systems have been reviewed and are negative for complaint unless documented above.    Physical Exam:  General: Well-developed and well-nourished in appearance.  Skin: No rashes or concerning lesions on the visible portions of the skin.  Eyes: Extraocular movements intact. Visual fields grossly normal.  Ears: Pinna are normal in shape and position. External canals are patent.  Nose: Dorsum is midline. Septum is midline and turbinates are normal on anterior  rhinoscopy.  Oral Cavity/Oropharynx: Dentition is intact. Mucous membranes moist. No masses or  lesions. Tonsils are symmetric and non-obstructive.  Neck: Midline trachea without masses or lesions. Thyroid is normal in size.  Lymphatics: No palpable cervical lymphadenopathy  Respiratory: No respiratory distress. Quiet breathing without stertor or stridor.  Cardiovascular: Regular rate and rhythm. Warm extremities with equal pulses.  Psych: Normal mood and affect.  Judgement and insight appropriate.  Neuro: Alert and oriented. CN II-XII grossly intact. No focal deficits.  Musculoskeletal: Gait intact. Moves all extremities well without apparent deformities.    Assessment: ***    Plan: ***

## 2024-01-03 ENCOUNTER — TELEPHONE (OUTPATIENT)
Dept: PLASTIC SURGERY | Facility: HOSPITAL | Age: 64
End: 2024-01-03
Payer: COMMERCIAL

## 2024-01-03 NOTE — TELEPHONE ENCOUNTER
Patient was referred to ENT for a left temporalis vascular malformation and is scheduled with Dr. Kang 01/11/24. Dr. Kang sent me a message that he would like patient to be seen by Vascular Anomalies team. LM for patient to call back to discuss.

## 2024-01-08 ENCOUNTER — DOCUMENTATION (OUTPATIENT)
Dept: PHYSICAL THERAPY | Facility: CLINIC | Age: 64
End: 2024-01-08
Payer: COMMERCIAL

## 2024-01-08 ENCOUNTER — APPOINTMENT (OUTPATIENT)
Dept: PHYSICAL THERAPY | Facility: CLINIC | Age: 64
End: 2024-01-08
Payer: COMMERCIAL

## 2024-01-08 NOTE — PROGRESS NOTES
Physical Therapy                 Therapy Communication Note    Patient Name: Gilberto Albarran  MRN: [id number]  Today's Date: 1/8/2024     Discipline: Physical Therapy    Missed Time: Cancel    Comment:  Patient called at time of appointment canceling PT apointment

## 2024-01-11 ENCOUNTER — APPOINTMENT (OUTPATIENT)
Dept: OTOLARYNGOLOGY | Facility: CLINIC | Age: 64
End: 2024-01-11
Payer: COMMERCIAL

## 2024-02-07 ENCOUNTER — APPOINTMENT (OUTPATIENT)
Dept: PLASTIC SURGERY | Facility: HOSPITAL | Age: 64
End: 2024-02-07
Payer: COMMERCIAL

## 2024-02-07 ENCOUNTER — APPOINTMENT (OUTPATIENT)
Dept: DERMATOLOGY | Facility: HOSPITAL | Age: 64
End: 2024-02-07
Payer: COMMERCIAL

## 2024-02-08 ENCOUNTER — APPOINTMENT (OUTPATIENT)
Dept: PHYSICAL THERAPY | Facility: CLINIC | Age: 64
End: 2024-02-08
Payer: COMMERCIAL

## 2024-02-08 ENCOUNTER — DOCUMENTATION (OUTPATIENT)
Dept: PHYSICAL THERAPY | Facility: CLINIC | Age: 64
End: 2024-02-08

## 2024-02-08 NOTE — PROGRESS NOTES
Physical Therapy Re-Assessment    Patient Name: Gilberto Albarran  MRN: [id number]  Today's Date: 2/8/2024       Insurance reviewed   Visit number: 9  20 COPAY, 1000 DED (MET) 6850 OOP MAX, 30 VS LESLIE YR,NO AUTH      Assessment:  Session focused on improving SLS stability. Pt challenged with prolonged SLS requiring up to CGA-min assist to maintain stability, even with UE support. Pt fatigues quickly however responds well to therapeutic rest. No increase of L LE pain with activity. Pt to benefit from continued skilled PT services to further progress balance, gait mechanics and overall mobility      Plan;  Continue to progress  -Static balance  -Dynamic balance  -Gait mechanics    Current Problem:   No diagnosis found.      Subjective   Patient seen for the first time since 12/27/23.    Treatments:    Therapeutic Activity (00265): timed minutes , units  .      Goals;   By Discharge patient will demo:  [place] in HEP  No falls during POC  Improvement in the following outcome measures to decrease risk of falls:  Improve 5xSTS <15sec w/out UE  Improve MiniBESS by 5.5pts consistent w/MDC and to decrease risk of falling.   Demonstrate ability to fwd step onto an 8in step w/out UE support.   Subjective improvement in ability to rise from a chair, and rise from bathroom toilet.  Subjective improvement in ability to step out of a high tub.   Subjective improvement in freezing gait after sitting for prolonged periods of time.

## 2024-02-08 NOTE — PROGRESS NOTES
Physical Therapy                 Therapy Communication Note    Patient Name: Gilberto Albarran  MRN: [id number]  Today's Date: 2/8/2024     Discipline: Physical Therapy    Missed Time: Cancel    Comment:  Patient canceled via My Chart 15 minutes past time of appointment due to car troubles.

## 2024-02-17 ASSESSMENT — DERMATOLOGY QUALITY OF LIFE (QOL) ASSESSMENT
RATE HOW BOTHERED YOU ARE BY EFFECTS OF YOUR SKIN PROBLEMS ON YOUR ACTIVITIES (EG, GOING OUT, ACCOMPLISHING WHAT YOU WANT, WORK ACTIVITIES OR YOUR RELATIONSHIPS WITH OTHERS): 1
RATE HOW EMOTIONALLY BOTHERED YOU ARE BY YOUR SKIN PROBLEM (FOR EXAMPLE, WORRY, EMBARRASSMENT, FRUSTRATION): 2
RATE HOW BOTHERED YOU ARE BY SYMPTOMS OF YOUR SKIN PROBLEM (EG, ITCHING, STINGING BURNING, HURTING OR SKIN IRRITATION): 5
DATE THE QUALITY-OF-LIFE ASSESSMENT WAS COMPLETED: 66887
WHAT SINGLE SKIN CONDITION LISTED BELOW IS THE PATIENT ANSWERING THE QUALITY-OF-LIFE ASSESSMENT QUESTIONS ABOUT: PSORIASIS
RATE HOW BOTHERED YOU ARE BY SYMPTOMS OF YOUR SKIN PROBLEM (EG, ITCHING, STINGING BURNING, HURTING OR SKIN IRRITATION): 5
RATE HOW EMOTIONALLY BOTHERED YOU ARE BY YOUR SKIN PROBLEM (FOR EXAMPLE, WORRY, EMBARRASSMENT, FRUSTRATION): 2
RATE HOW BOTHERED YOU ARE BY EFFECTS OF YOUR SKIN PROBLEMS ON YOUR ACTIVITIES (EG, GOING OUT, ACCOMPLISHING WHAT YOU WANT, WORK ACTIVITIES OR YOUR RELATIONSHIPS WITH OTHERS): 1
WHAT SINGLE SKIN CONDITION LISTED BELOW IS THE PATIENT ANSWERING THE QUALITY-OF-LIFE ASSESSMENT QUESTIONS ABOUT: PSORIASIS

## 2024-02-17 ASSESSMENT — PATIENT GLOBAL ASSESSMENT (PGA): WHAT IS THE PGA: PATIENT GLOBAL ASSESSMENT:  3 - MODERATE

## 2024-02-19 ENCOUNTER — OFFICE VISIT (OUTPATIENT)
Dept: DERMATOLOGY | Facility: CLINIC | Age: 64
End: 2024-02-19
Payer: COMMERCIAL

## 2024-02-19 DIAGNOSIS — L21.9 DERMATITIS, SEBORRHEIC: ICD-10-CM

## 2024-02-19 DIAGNOSIS — L21.9 SEBORRHEIC DERMATITIS: Primary | ICD-10-CM

## 2024-02-19 PROCEDURE — 99204 OFFICE O/P NEW MOD 45 MIN: CPT | Performed by: NURSE PRACTITIONER

## 2024-02-19 PROCEDURE — 4010F ACE/ARB THERAPY RXD/TAKEN: CPT | Performed by: NURSE PRACTITIONER

## 2024-02-19 RX ORDER — KETOCONAZOLE 20 MG/ML
SHAMPOO, SUSPENSION TOPICAL EVERY OTHER DAY
Qty: 120 ML | Refills: 3 | Status: SHIPPED | OUTPATIENT
Start: 2024-02-19 | End: 2024-04-07

## 2024-02-19 RX ORDER — CLOBETASOL PROPIONATE 0.46 MG/ML
SOLUTION TOPICAL 2 TIMES DAILY
Qty: 50 ML | Refills: 3 | Status: SHIPPED | OUTPATIENT
Start: 2024-02-19 | End: 2024-03-04

## 2024-02-19 NOTE — PROGRESS NOTES
Subjective     Gilberto Albarran is a 63 y.o. male who presents for the following: Rash.     New patient visit patient in for dry flaking of skin to face patient states he is currently using Selsun Blue and it is somewhat helpful.    Review of Systems:  No other skin or systemic complaints other than what is documented elsewhere in the note.    The following portions of the chart were reviewed this encounter and updated as appropriate:       Skin Cancer History  No skin cancer on file.    Specialty Problems          Dermatology Problems    Contusion, chest wall     Past Medical History:  Gilberto Albarran  has a past medical history of Chronic obstructive pulmonary disease, unspecified (CMS/[facility]) (01/20/2022), Diplopia (12/04/2020), Essential (primary) hypertension, Hyperlipidemia, unspecified, Type 2 diabetes mellitus without complications (CMS/[facility]) (01/20/2022), and Unspecified exotropia (12/04/2020).    Past Surgical History:  Gilberto Albarran  has a past surgical history that includes Other surgical history (09/04/2020).    Family History:  Patient family history includes Cataracts in his mother.    Social History:  Gilberto Albarran  reports that he has quit smoking. His smoking use included cigarettes. He smoked an average of 1 pack per day. He does not have any smokeless tobacco history on file. He reports current alcohol use of about 2.0 standard drinks of alcohol per week. He reports that he does not use drugs.    Allergies:  Patient has no known allergies.    Current Medications / CAM's:    Current Outpatient Medications:     albuterol 90 mcg/actuation inhaler, Inhale., Disp: , Rfl:     amLODIPine (Norvasc) 10 mg tablet, Take 1 tablet (10 mg) by mouth once daily., Disp: , Rfl:     atenolol (Tenormin) 100 mg tablet, Take by mouth., Disp: , Rfl:     atorvastatin (Lipitor) 10 mg tablet, Take 1 tablet (10 mg) by mouth once daily. As directed, Disp: , Rfl:     carbidopa-levodopa (Sinemet CR)  mg ER tablet, Take 1 tablet  by mouth once daily at bedtime. Do not crush, chew, or split., Disp: 90 tablet, Rfl: 3    carbidopa-levodopa (Sinemet)  mg tablet, TAKE 1 TABLET BY MOUTH 3 TIMES DAILY, Disp: 90 tablet, Rfl: 10    citalopram (CeleXA) 40 mg tablet, Take 1 tablet (40 mg) by mouth once daily., Disp: , Rfl:     clobetasol (Temovate) 0.05 % external solution, Apply topically 2 times a day for 14 days., Disp: 50 mL, Rfl: 3    ergocalciferol (Vitamin D-2) 1.25 MG (47737 UT) capsule, Take by mouth., Disp: , Rfl:     famotidine (Pepcid) 40 mg tablet, Take 1 tablet (40 mg) by mouth once daily. As directed, Disp: , Rfl:     fluticasone propion-salmeteroL (Advair Diskus) 500-50 mcg/dose diskus inhaler, Inhale 1 puff 2 times a day., Disp: , Rfl:     furosemide (Lasix) 40 mg tablet, Take 1 tablet (40 mg) by mouth 2 times a day., Disp: , Rfl:     gabapentin (Neurontin) 800 mg tablet, Take by mouth., Disp: , Rfl:     glipiZIDE (Glucotrol) 10 mg tablet, Take 1 tablet (10 mg) by mouth 2 times a day., Disp: , Rfl:     ketoconazole (NIZOral) 2 % shampoo, Apply topically every other day., Disp: 120 mL, Rfl: 3    losartan (Cozaar) 100 mg tablet, Take by mouth., Disp: , Rfl:     metFORMIN (Glucophage) 1,000 mg tablet, Take 1 tablet (1,000 mg) by mouth 2 times a day., Disp: , Rfl:     montelukast (Singulair) 10 mg tablet, Take 1 tablet (10 mg) by mouth once daily., Disp: , Rfl:     sildenafil (Viagra) 100 mg tablet, Take by mouth., Disp: , Rfl:     SITagliptin phosphate (Januvia) 100 mg tablet, Take 1 tablet (100 mg) by mouth once daily., Disp: , Rfl:     traZODone (Desyrel) 100 mg tablet, Take by mouth., Disp: , Rfl:      Objective   Well appearing patient in no apparent distress; mood and affect are within normal limits.    Assessment/Plan   1. Seborrheic dermatitis  Head - Anterior (Face), Scalp  Erythema with overlying greasy scale.    Plan: Counseling.  I counseled the patient regarding the following:  Skin care: Emollients, shampoos with  tar,selenium or zinc pyrithione can improve seborrheic dermatitis.    Expectations: Seborrheic  Dermatitis is chronic in nature with periods of remissions and flares. Flares can be triggered by stress.  Contact office if: Seborrheic dermatitis worsens, or fails to improve despite several months of treatment.    I discussed with the patient that prolonged use of topical steroids can result in the increased appearance of superficial blood vessels (telangiectasias), lightening (hypopigmentation) and thinning of  the skin (atrophy).       Patient understands to avoid using high potency steroids in skin folds, the groin or the face.  The patient verbalized understanding of the proper use and possible adverse effects of topical steroids.      All of the patient's questions and concerns were addressed.    PLAN:  Ketoconazole 2% shampoo 1-2 times a week increasing and decreasing as needed  Start clobetasol 0.05% scalp solution twice daily as needed to areas on scalp    Related Medications  ketoconazole (NIZOral) 2 % shampoo  Apply topically every other day.    clobetasol (Temovate) 0.05 % external solution  Apply topically 2 times a day for 14 days.    2. Dermatitis, seborrheic    Related Procedures  Referral to Dermatology

## 2024-02-28 ENCOUNTER — TREATMENT (OUTPATIENT)
Dept: PHYSICAL THERAPY | Facility: CLINIC | Age: 64
End: 2024-02-28
Payer: COMMERCIAL

## 2024-02-28 DIAGNOSIS — R26.89 IMBALANCE: Primary | ICD-10-CM

## 2024-02-28 DIAGNOSIS — Z91.81 AT RISK FOR FALLS: ICD-10-CM

## 2024-02-28 PROCEDURE — 97530 THERAPEUTIC ACTIVITIES: CPT | Mod: GP

## 2024-02-28 NOTE — PROGRESS NOTES
"Physical Therapy    Patient Name: Gilberto Albarran  MRN: [id number]  Today's Date: 2/28/2024  Time Calculation  Start Time: 1453  Stop Time: 1537  Time Calculation (min): 44 min    Insurance reviewed   Visit number: 1 of year (9 of POC)  20 COPAY, 1000 DED (MET) 6850 OOP MAX, 30 VS LESLIE YR,NO AUTH      Current Problem:   1. Imbalance  Follow Up In Physical Therapy      2. At risk for falls  Follow Up In Physical Therapy        Assessment:  Patient most significantly challenged by SLS balance and reactionary balance at this time.  These are likely the biggest reasons or patient's frequent falls and risk for falls.  As a result, recommending continued PT services to facilitate improvement in CLOF, decrease risk of falls, and maximize safety with mobility.    GOALS:  By Discharge patient will demo:  [place] in HEP  ^^ unmet d/t illness  No falls during POC  ^^ unmet  Improvement in the following outcome measures to decrease risk of falls:  Improve 5xSTS <15sec w/out UE  ^^ unmet  Improve MiniBESS by 5.5pts consistent w/ MDC and to decrease risk of falling.   ^^ unmet  ABC > 75%  ^^ unmet  Demonstrate ability to fwd step onto an 8in step w/out UE support.   Subjective improvement in ability to rise from a chair, and rise from bathroom toilet.  ^^ unmet  Subjective improvement in ability to step out of a high tub.   ^^ getting a walk-in put in soon  Subjective improvement in freezing gait after sitting for prolonged periods of time.   ^^ was better but ended up getting worse since was sick      Plan;  Continue to progress  - Dynamic stepping reactions  - SLS balance  - Stairs      Subjective   Patient seen for first time since 12/27/23.  Was sick for quite some time and was a \"couch potato\" as a result.  Feels like everything is harder to do because of this.  Feels like is back to \"old ways.\"  Sees Dr. Dewitt at the beginning of April.  Has fallen 3x since last visit, last one being 4-5 days ago, missed a step with the " sidewalk.    Treatments:    Therapeutic Activity (87846):  44 minutes    Demoing cogwheeling at elbows / biceps.  Significant resting tremor at R UE especially compared to L.    5xSTS:  22 seconds no UE  miniBESS:  21/28  ABC:  68%

## 2024-03-04 ENCOUNTER — TREATMENT (OUTPATIENT)
Dept: PHYSICAL THERAPY | Facility: CLINIC | Age: 64
End: 2024-03-04
Payer: COMMERCIAL

## 2024-03-04 DIAGNOSIS — Z91.81 AT RISK FOR FALLS: ICD-10-CM

## 2024-03-04 DIAGNOSIS — R26.89 IMBALANCE: Primary | ICD-10-CM

## 2024-03-04 PROCEDURE — 97112 NEUROMUSCULAR REEDUCATION: CPT | Mod: GP | Performed by: PHYSICAL THERAPIST

## 2024-03-04 NOTE — PROGRESS NOTES
Physical Therapy    Patient Name: Gilberto Albarran  MRN: [id number]  Today's Date: 3/4/2024  Time Calculation  Start Time: 1202  Stop Time: 1230  Time Calculation (min): 28 min    Insurance reviewed   Visit number: 2 of year (10 of POC)  20 COPAY, 1000 DED (MET) 6850 OOP MAX, 30 VS LESLIE YR,NO AUTH      Current Problem:   1. Imbalance        2. At risk for falls            Assessment:  Session focused on reactive postural stability and single-leg stability which patient tolerated very well. Patient demonstrated 1-2 large amplitude anterior steps which adequately regained his stability on 100% of trials. Patient demonstrated 2-4 small amplitude posterior steps and required PT assist to regain postural stability. This should continue to improve w/ repetition. At this time, patient continues to require skilled PT services to address ongoing impairments in postural righting reactions, dynamic balance, and functional mobility to promote return to PLOF.     GOALS:  By Discharge patient will demo:  [place] in HEP  ^^ unmet d/t illness  No falls during POC  ^^ unmet  Improvement in the following outcome measures to decrease risk of falls:  Improve 5xSTS <15sec w/out UE  ^^ unmet  Improve MiniBESS by 5.5pts consistent w/ MDC and to decrease risk of falling.   ^^ unmet  ABC > 75%  ^^ unmet  Demonstrate ability to fwd step onto an 8in step w/out UE support.   Subjective improvement in ability to rise from a chair, and rise from bathroom toilet.  ^^ unmet  Subjective improvement in ability to step out of a high tub.   ^^ getting a walk-in put in soon  Subjective improvement in freezing gait after sitting for prolonged periods of time.   ^^ was better but ended up getting worse since was sick      Plan;  Continue to progress  - Dynamic stepping reactions  - SLS balance  - Stairs      Subjective   Patient arrives to the clinic w/ reports of feeling stiff and has knee pain. Patient w/ no other updates since last session.      Treatments:  Neuromuscular Re-Education (26 min):  -Slip , min - mod amplitude perturbations, ANT/POST, 2 x 6 min - countdown and direction provided  -Modified SLS w/ contralateral LE on box + dynadisc, unilateral UE support, 2 x 30s R/L

## 2024-03-14 ENCOUNTER — TREATMENT (OUTPATIENT)
Dept: PHYSICAL THERAPY | Facility: CLINIC | Age: 64
End: 2024-03-14
Payer: COMMERCIAL

## 2024-03-14 DIAGNOSIS — R26.89 IMBALANCE: Primary | ICD-10-CM

## 2024-03-14 DIAGNOSIS — Z91.81 AT RISK FOR FALLS: ICD-10-CM

## 2024-03-14 PROCEDURE — 97112 NEUROMUSCULAR REEDUCATION: CPT | Mod: GP | Performed by: PHYSICAL THERAPIST

## 2024-03-14 NOTE — PROGRESS NOTES
Physical Therapy    Patient Name: Gilberto Albarran  MRN: [id number]  Today's Date: 3/14/2024  Time Calculation  Start Time: 1050  Stop Time: 1115  Time Calculation (min): 25 min    Insurance reviewed   Visit number: 3 of year (11 of POC)  20 COPAY, 1000 DED (MET) 6850 OOP MAX, 30 VS LESLIE YR,NO AUTH      Current Problem:   1. Imbalance        2. At risk for falls          Assessment:  Patient arrived to clinic ~15 minutes late leading to abbreviated session. Session focused on reactive balance, priming/training posterior step amplitude, and NBOS balance. Patient's balance appeared more challenged this session potentially d/t increased joint pain. Patient able to demonstrate appropriate amplitude, reciprocal posterior stepping, but required PT assist to maintain postural stability. Still challenged w/ SLS. At this time, patient continues to require skilled PT services to address ongoing impairments in postural righting reactions, dynamic balance, and functional mobility to promote return to PLOF.     Plan;  Continue to progress  - Dynamic stepping reactions  - SLS balance  - Stairs    Subjective   Patient arrives to the clinic w/ reports of intense joint aches d/t weather changes. No new reports from last session. States compliance w/ HEP and is w/out questions or concerns.     Treatments:  Neuromuscular Re-Education (23 min):  -Ambulation + random ANT/POST perturbations at gait belt, x 5 minutes - patient demo'd 2-3 posterior stepping reactions during posterior LOB and required mod-max A by PT to recover, ANT steps continue to be appropriate amplitude and speed  -BWD ambulation, x 25' + VC to take BIG steps, min - mod A for maintaining balance/balance recovery - to prime and train larger amplitude posterior steps for potential carry over into stepping reactions  -Modified SLS w/ contralateral LE + blue dynadisc, unilateral UE support, 2 x 30s R/L

## 2024-03-19 ENCOUNTER — DOCUMENTATION (OUTPATIENT)
Dept: PHYSICAL THERAPY | Facility: CLINIC | Age: 64
End: 2024-03-19

## 2024-03-19 NOTE — PROGRESS NOTES
Physical Therapy                 Therapy Communication Note    Patient Name: Gilberto Albarran  MRN: [id number]  Today's Date: 3/19/2024     Discipline: Physical Therapy    Missed Time: No Show    Comment:  Patient no showed follow up appointment this date at 1400.

## 2024-03-26 ENCOUNTER — APPOINTMENT (OUTPATIENT)
Dept: PHYSICAL THERAPY | Facility: CLINIC | Age: 64
End: 2024-03-26
Payer: COMMERCIAL

## 2024-03-26 NOTE — PROGRESS NOTES
"Physical Therapy    Patient Name: Gilberto Albarran  MRN: [id number]  Today's Date: 3/26/2024       Insurance reviewed   Visit number: 4 of year (9 of POC)  20 COPAY, 1000 DED (MET) 6850 OOP MAX, 30 VS LESLIE YR,NO AUTH      Current Problem:   No diagnosis found.    Assessment:  Patient most significantly challenged by SLS balance and reactionary balance at this time.  These are likely the biggest reasons or patient's frequent falls and risk for falls.  As a result, recommending continued PT services to facilitate improvement in CLOF, decrease risk of falls, and maximize safety with mobility.    GOALS:  By Discharge patient will demo:  Pierce in HEP  ^^ unmet d/t illness  No falls during POC  ^^ unmet  Improvement in the following outcome measures to decrease risk of falls:  Improve 5xSTS <15sec w/out UE  ^^ unmet  Improve MiniBESS by 5.5pts consistent w/ MDC and to decrease risk of falling.   ^^ unmet  ABC > 75%  ^^ unmet  Demonstrate ability to fwd step onto an 8in step w/out UE support.   Subjective improvement in ability to rise from a chair, and rise from bathroom toilet.  ^^ unmet  Subjective improvement in ability to step out of a high tub.   ^^ getting a walk-in put in soon  Subjective improvement in freezing gait after sitting for prolonged periods of time.   ^^ was better but ended up getting worse since was sick      Plan;  Continue to progress  - Dynamic stepping reactions  - SLS balance  - Stairs      Subjective   Patient seen for first time since 12/27/23.  Was sick for quite some time and was a \"couch potato\" as a result.  Feels like everything is harder to do because of this.  Feels like is back to \"old ways.\"  Sees Dr. Dewitt at the beginning of April.  Has fallen 3x since last visit, last one being 4-5 days ago, missed a step with the sidewalk.    Treatments:    Therapeutic Activity (97197):  44 minutes    Demoing cogwheeling at elbows / biceps.  Significant resting tremor at R UE especially compared " to L.    5xSTS:  22 seconds no UE  miniBESS:  21/28  ABC:  68%

## 2024-03-28 ENCOUNTER — TREATMENT (OUTPATIENT)
Dept: PHYSICAL THERAPY | Facility: CLINIC | Age: 64
End: 2024-03-28
Payer: COMMERCIAL

## 2024-03-28 DIAGNOSIS — Z91.81 AT RISK FOR FALLS: ICD-10-CM

## 2024-03-28 DIAGNOSIS — R26.89 IMBALANCE: Primary | ICD-10-CM

## 2024-03-28 PROCEDURE — 97112 NEUROMUSCULAR REEDUCATION: CPT | Mod: GP | Performed by: PHYSICAL THERAPIST

## 2024-03-28 PROCEDURE — 97110 THERAPEUTIC EXERCISES: CPT | Mod: GP | Performed by: PHYSICAL THERAPIST

## 2024-03-28 NOTE — PROGRESS NOTES
Physical Therapy    Patient Name: Gilberto Albarran  MRN: [id number]  Today's Date: 3/28/2024  Time Calculation  Start Time: 1220  Stop Time: 1300  Time Calculation (min): 40 min    Insurance reviewed   Visit number: 4 of year (13 of POC)  20 COPAY, 1000 DED (MET) 6850 OOP MAX, 30 VS LESLIE YR,NO AUTH      Current Problem:   1. Imbalance        2. At risk for falls            Assessment:  Session focused initially on reactive postural righting reactions, but transitioned to gentle aerobic activity and education d/t COPD exacerbation and significant knee pain. Patient demonstrated independent stability during posterior righting reaction training prior to terminating intervention. Patient's SpO2 dropped to 90-92% after all interventions and increased to >95% after several minutes of rest. Provided handout for updated HEP per patient request. At this time, patient continues to require skilled PT services to address ongoing impairments in postural righting reactions, dynamic balance, and functional mobility to promote return to PLOF.     Plan;  Continue to progress  - Dynamic stepping reactions  - SLS balance  - Stairs    Subjective   Patient arrives to the clinic w/ reports of being in a COPD exacerbation. Patient reports 8/10 knee pain. Endorses a couple falls since last session - 1 was while he was negotiating a curb step and knee gave out on him and 1 was while walking in front yard and lost balance. Does report that he was able to attempt to take a large posterior step to try to catch himself, but his foot hit the curb which shortened his step and lead to fall. States compliance w/ HEP and is w/out questions or concerns.     Objective  SpO2: 98% (dropped to 90-92% after each intervention, came back up to >95% after several minutes of rest)    Treatments:  Therapeutic Exercise (23 min):  -NuStep, x 7 min, lvl 1, B UE and B LE  -Established updated HEP per pt request and demonstrated all exercises to enhance pt understanding    -Education: Brief discussion regarding potential benefit from orthopedic surgery evaluation d/t ongoing significant knee pain which is impacting ability to progress PT interventions and safely/efficiently move around at home. Discussion regarding importance of exercise/movement for PD management which is hindered d/t ongoing orthopedic challenges.     Neuromuscular Re-Education (15 min):  -Reactive stepping w/ green TB at unilateral bar, CGA   -BWD, x 10 R/L   -FWD, x 10 R/L   -Numerous ambulation bouts w/out device throughout session  -Numerous STS trials throughout session - VC for anterior chest lean d/t incr challenge w/ anterior weight shift    Additions to HEP:  Access Code: [email or web address]  URL: [email or web address]  Date: 03/28/2024  Prepared by: Elinor Martinez    Exercises  - Plank with Thoracic Rotation on Counter  - 1 x daily - 7 x weekly - 3 sets - 10 reps  - Standing Shoulder Horizontal Abduction with Resistance  - 1 x daily - 7 x weekly - 3 sets - 10 reps  - Seated Thoracic Lumbar Extension  - 1 x daily - 7 x weekly - 3 sets - 10 reps

## 2024-03-29 ENCOUNTER — APPOINTMENT (OUTPATIENT)
Dept: CARDIOLOGY | Facility: HOSPITAL | Age: 64
End: 2024-03-29
Payer: COMMERCIAL

## 2024-03-29 ENCOUNTER — HOSPITAL ENCOUNTER (OUTPATIENT)
Facility: HOSPITAL | Age: 64
Setting detail: OBSERVATION
Discharge: HOME | End: 2024-03-31
Attending: STUDENT IN AN ORGANIZED HEALTH CARE EDUCATION/TRAINING PROGRAM | Admitting: INTERNAL MEDICINE
Payer: COMMERCIAL

## 2024-03-29 ENCOUNTER — APPOINTMENT (OUTPATIENT)
Dept: RADIOLOGY | Facility: HOSPITAL | Age: 64
End: 2024-03-29
Payer: COMMERCIAL

## 2024-03-29 DIAGNOSIS — R09.02 HYPOXIA: ICD-10-CM

## 2024-03-29 DIAGNOSIS — J96.01 ACUTE HYPOXIC RESPIRATORY FAILURE (MULTI): ICD-10-CM

## 2024-03-29 DIAGNOSIS — J44.1 COPD EXACERBATION (MULTI): Primary | ICD-10-CM

## 2024-03-29 DIAGNOSIS — I51.89 DIASTOLIC DYSFUNCTION: ICD-10-CM

## 2024-03-29 PROBLEM — S20.219A CONTUSION, CHEST WALL: Status: RESOLVED | Noted: 2023-08-15 | Resolved: 2024-03-29
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
## 2024-03-29 PROBLEM — J44.9 CHRONIC OBSTRUCTIVE PULMONARY DISEASE (MULTI): Chronic | Status: ACTIVE | Noted: 2023-08-15

## 2024-03-29 PROBLEM — S22.31XA RIGHT RIB FRACTURE: Status: RESOLVED | Noted: 2023-08-15 | Resolved: 2024-03-29

## 2024-03-29 PROBLEM — Z78.9 DAILY CONSUMPTION OF ALCOHOL: Status: ACTIVE | Noted: 2024-03-29

## 2024-03-29 PROBLEM — R07.9 CHEST PAIN: Status: ACTIVE | Noted: 2024-03-29

## 2024-03-29 PROBLEM — E11.9 TYPE 2 DIABETES MELLITUS (MULTI): Status: ACTIVE | Noted: 2024-03-29

## 2024-03-29 LAB
ALBUMIN SERPL BCP-MCNC: 4.1 G/DL (ref 3.4–5)
ALP SERPL-CCNC: 93 U/L (ref 33–136)
ALT SERPL W P-5'-P-CCNC: 15 U/L (ref 10–52)
ANION GAP SERPL CALC-SCNC: 12 MMOL/L (ref 10–20)
AST SERPL W P-5'-P-CCNC: 32 U/L (ref 9–39)
BASOPHILS # BLD AUTO: 0.05 X10*3/UL (ref 0–0.1)
BASOPHILS NFR BLD AUTO: 0.6 %
BILIRUB SERPL-MCNC: 0.6 MG/DL (ref 0–1.2)
BNP SERPL-MCNC: 374 PG/ML (ref 0–99)
BUN SERPL-MCNC: 21 MG/DL (ref 6–23)
CALCIUM SERPL-MCNC: 9.3 MG/DL (ref 8.6–10.3)
CARDIAC TROPONIN I PNL SERPL HS: 18 NG/L (ref 0–20)
CARDIAC TROPONIN I PNL SERPL HS: 19 NG/L (ref 0–20)
CHLORIDE SERPL-SCNC: 99 MMOL/L (ref 98–107)
CO2 SERPL-SCNC: 28 MMOL/L (ref 21–32)
CREAT SERPL-MCNC: 1.37 MG/DL (ref 0.5–1.3)
D DIMER PPP FEU-MCNC: 2066 NG/ML FEU
EGFRCR SERPLBLD CKD-EPI 2021: 58 ML/MIN/1.73M*2
EOSINOPHIL # BLD AUTO: 0.16 X10*3/UL (ref 0–0.7)
EOSINOPHIL NFR BLD AUTO: 1.9 %
ERYTHROCYTE [DISTWIDTH] IN BLOOD BY AUTOMATED COUNT: 13.5 % (ref 11.5–14.5)
FLUAV RNA RESP QL NAA+PROBE: NOT DETECTED
FLUBV RNA RESP QL NAA+PROBE: NOT DETECTED
GLUCOSE BLD MANUAL STRIP-MCNC: 354 MG/DL (ref 74–99)
GLUCOSE SERPL-MCNC: 70 MG/DL (ref 74–99)
HCT VFR BLD AUTO: 33.2 % (ref 41–52)
HGB BLD-MCNC: 10.7 G/DL (ref 13.5–17.5)
IMM GRANULOCYTES # BLD AUTO: 0.02 X10*3/UL (ref 0–0.7)
IMM GRANULOCYTES NFR BLD AUTO: 0.2 % (ref 0–0.9)
INR PPP: 1 (ref 0.9–1.1)
LYMPHOCYTES # BLD AUTO: 0.97 X10*3/UL (ref 1.2–4.8)
LYMPHOCYTES NFR BLD AUTO: 11.6 %
MCH RBC QN AUTO: 30.7 PG (ref 26–34)
MCHC RBC AUTO-ENTMCNC: 32.2 G/DL (ref 32–36)
MCV RBC AUTO: 95 FL (ref 80–100)
MONOCYTES # BLD AUTO: 1.08 X10*3/UL (ref 0.1–1)
MONOCYTES NFR BLD AUTO: 12.9 %
NEUTROPHILS # BLD AUTO: 6.1 X10*3/UL (ref 1.2–7.7)
NEUTROPHILS NFR BLD AUTO: 72.8 %
NRBC BLD-RTO: 0 /100 WBCS (ref 0–0)
PLATELET # BLD AUTO: 222 X10*3/UL (ref 150–450)
POTASSIUM SERPL-SCNC: 4.2 MMOL/L (ref 3.5–5.3)
PROT SERPL-MCNC: 6.9 G/DL (ref 6.4–8.2)
PROTHROMBIN TIME: 11.5 SECONDS (ref 9.8–12.8)
RBC # BLD AUTO: 3.48 X10*6/UL (ref 4.5–5.9)
SARS-COV-2 RNA RESP QL NAA+PROBE: NOT DETECTED
SODIUM SERPL-SCNC: 135 MMOL/L (ref 136–145)
WBC # BLD AUTO: 8.4 X10*3/UL (ref 4.4–11.3)

## 2024-03-29 PROCEDURE — G0378 HOSPITAL OBSERVATION PER HR: HCPCS

## 2024-03-29 PROCEDURE — 87636 SARSCOV2 & INF A&B AMP PRB: CPT | Performed by: STUDENT IN AN ORGANIZED HEALTH CARE EDUCATION/TRAINING PROGRAM

## 2024-03-29 PROCEDURE — 84484 ASSAY OF TROPONIN QUANT: CPT | Performed by: STUDENT IN AN ORGANIZED HEALTH CARE EDUCATION/TRAINING PROGRAM

## 2024-03-29 PROCEDURE — 2500000004 HC RX 250 GENERAL PHARMACY W/ HCPCS (ALT 636 FOR OP/ED)

## 2024-03-29 PROCEDURE — 96375 TX/PRO/DX INJ NEW DRUG ADDON: CPT | Mod: 59

## 2024-03-29 PROCEDURE — 99285 EMERGENCY DEPT VISIT HI MDM: CPT | Mod: 25

## 2024-03-29 PROCEDURE — 36415 COLL VENOUS BLD VENIPUNCTURE: CPT | Performed by: STUDENT IN AN ORGANIZED HEALTH CARE EDUCATION/TRAINING PROGRAM

## 2024-03-29 PROCEDURE — 93005 ELECTROCARDIOGRAM TRACING: CPT

## 2024-03-29 PROCEDURE — 2500000004 HC RX 250 GENERAL PHARMACY W/ HCPCS (ALT 636 FOR OP/ED): Performed by: STUDENT IN AN ORGANIZED HEALTH CARE EDUCATION/TRAINING PROGRAM

## 2024-03-29 PROCEDURE — 93010 ELECTROCARDIOGRAM REPORT: CPT | Performed by: INTERNAL MEDICINE

## 2024-03-29 PROCEDURE — 2500000002 HC RX 250 W HCPCS SELF ADMINISTERED DRUGS (ALT 637 FOR MEDICARE OP, ALT 636 FOR OP/ED): Performed by: NURSE PRACTITIONER

## 2024-03-29 PROCEDURE — 93010 ELECTROCARDIOGRAM REPORT: CPT | Performed by: STUDENT IN AN ORGANIZED HEALTH CARE EDUCATION/TRAINING PROGRAM

## 2024-03-29 PROCEDURE — 94640 AIRWAY INHALATION TREATMENT: CPT

## 2024-03-29 PROCEDURE — 71045 X-RAY EXAM CHEST 1 VIEW: CPT

## 2024-03-29 PROCEDURE — 2500000002 HC RX 250 W HCPCS SELF ADMINISTERED DRUGS (ALT 637 FOR MEDICARE OP, ALT 636 FOR OP/ED): Performed by: STUDENT IN AN ORGANIZED HEALTH CARE EDUCATION/TRAINING PROGRAM

## 2024-03-29 PROCEDURE — 99285 EMERGENCY DEPT VISIT HI MDM: CPT | Performed by: STUDENT IN AN ORGANIZED HEALTH CARE EDUCATION/TRAINING PROGRAM

## 2024-03-29 PROCEDURE — 85379 FIBRIN DEGRADATION QUANT: CPT | Performed by: STUDENT IN AN ORGANIZED HEALTH CARE EDUCATION/TRAINING PROGRAM

## 2024-03-29 PROCEDURE — 2500000004 HC RX 250 GENERAL PHARMACY W/ HCPCS (ALT 636 FOR OP/ED): Performed by: NURSE PRACTITIONER

## 2024-03-29 PROCEDURE — 80053 COMPREHEN METABOLIC PANEL: CPT | Performed by: STUDENT IN AN ORGANIZED HEALTH CARE EDUCATION/TRAINING PROGRAM

## 2024-03-29 PROCEDURE — 2550000001 HC RX 255 CONTRASTS: Performed by: STUDENT IN AN ORGANIZED HEALTH CARE EDUCATION/TRAINING PROGRAM

## 2024-03-29 PROCEDURE — 85610 PROTHROMBIN TIME: CPT | Performed by: STUDENT IN AN ORGANIZED HEALTH CARE EDUCATION/TRAINING PROGRAM

## 2024-03-29 PROCEDURE — 82947 ASSAY GLUCOSE BLOOD QUANT: CPT | Mod: 59

## 2024-03-29 PROCEDURE — 83880 ASSAY OF NATRIURETIC PEPTIDE: CPT | Performed by: STUDENT IN AN ORGANIZED HEALTH CARE EDUCATION/TRAINING PROGRAM

## 2024-03-29 PROCEDURE — 71275 CT ANGIOGRAPHY CHEST: CPT

## 2024-03-29 PROCEDURE — 71275 CT ANGIOGRAPHY CHEST: CPT | Mod: FOREIGN READ | Performed by: RADIOLOGY

## 2024-03-29 PROCEDURE — 71045 X-RAY EXAM CHEST 1 VIEW: CPT | Mod: FOREIGN READ | Performed by: RADIOLOGY

## 2024-03-29 PROCEDURE — 85025 COMPLETE CBC W/AUTO DIFF WBC: CPT | Performed by: STUDENT IN AN ORGANIZED HEALTH CARE EDUCATION/TRAINING PROGRAM

## 2024-03-29 PROCEDURE — 96372 THER/PROPH/DIAG INJ SC/IM: CPT | Performed by: NURSE PRACTITIONER

## 2024-03-29 PROCEDURE — 99222 1ST HOSP IP/OBS MODERATE 55: CPT | Performed by: NURSE PRACTITIONER

## 2024-03-29 PROCEDURE — 96374 THER/PROPH/DIAG INJ IV PUSH: CPT | Mod: 59

## 2024-03-29 PROCEDURE — 2500000001 HC RX 250 WO HCPCS SELF ADMINISTERED DRUGS (ALT 637 FOR MEDICARE OP): Performed by: NURSE PRACTITIONER

## 2024-03-29 PROCEDURE — 2500000001 HC RX 250 WO HCPCS SELF ADMINISTERED DRUGS (ALT 637 FOR MEDICARE OP)

## 2024-03-29 RX ORDER — FUROSEMIDE 40 MG/1
40 TABLET ORAL 2 TIMES DAILY
Status: DISCONTINUED | OUTPATIENT
Start: 2024-03-29 | End: 2024-03-30

## 2024-03-29 RX ORDER — ATORVASTATIN CALCIUM 10 MG/1
10 TABLET, FILM COATED ORAL NIGHTLY
Status: DISCONTINUED | OUTPATIENT
Start: 2024-03-29 | End: 2024-03-31 | Stop reason: HOSPADM

## 2024-03-29 RX ORDER — MULTIVIT-MIN/IRON FUM/FOLIC AC 7.5 MG-4
1 TABLET ORAL DAILY
Status: DISCONTINUED | OUTPATIENT
Start: 2024-03-29 | End: 2024-03-31 | Stop reason: HOSPADM

## 2024-03-29 RX ORDER — IPRATROPIUM BROMIDE AND ALBUTEROL SULFATE 2.5; .5 MG/3ML; MG/3ML
3 SOLUTION RESPIRATORY (INHALATION)
Status: DISCONTINUED | OUTPATIENT
Start: 2024-03-29 | End: 2024-03-29

## 2024-03-29 RX ORDER — ENOXAPARIN SODIUM 100 MG/ML
40 INJECTION SUBCUTANEOUS EVERY 12 HOURS SCHEDULED
Status: DISCONTINUED | OUTPATIENT
Start: 2024-03-29 | End: 2024-03-31 | Stop reason: HOSPADM

## 2024-03-29 RX ORDER — SEMAGLUTIDE 1.34 MG/ML
1 INJECTION, SOLUTION SUBCUTANEOUS
COMMUNITY

## 2024-03-29 RX ORDER — CARBIDOPA AND LEVODOPA 25; 100 MG/1; MG/1
1 TABLET ORAL 3 TIMES DAILY
Status: DISCONTINUED | OUTPATIENT
Start: 2024-03-29 | End: 2024-03-31 | Stop reason: HOSPADM

## 2024-03-29 RX ORDER — TALC
3 POWDER (GRAM) TOPICAL NIGHTLY PRN
Status: DISCONTINUED | OUTPATIENT
Start: 2024-03-29 | End: 2024-03-31 | Stop reason: HOSPADM

## 2024-03-29 RX ORDER — ACETAMINOPHEN 500 MG
10 TABLET ORAL NIGHTLY
Status: DISCONTINUED | OUTPATIENT
Start: 2024-03-29 | End: 2024-03-31 | Stop reason: HOSPADM

## 2024-03-29 RX ORDER — CARBIDOPA AND LEVODOPA 25; 100 MG/1; MG/1
1 TABLET, EXTENDED RELEASE ORAL NIGHTLY
Status: DISCONTINUED | OUTPATIENT
Start: 2024-03-29 | End: 2024-03-29

## 2024-03-29 RX ORDER — INSULIN LISPRO 100 [IU]/ML
0-5 INJECTION, SOLUTION INTRAVENOUS; SUBCUTANEOUS
Status: DISCONTINUED | OUTPATIENT
Start: 2024-03-29 | End: 2024-03-30

## 2024-03-29 RX ORDER — PREDNISONE 20 MG/1
40 TABLET ORAL DAILY
Status: DISCONTINUED | OUTPATIENT
Start: 2024-03-30 | End: 2024-03-31 | Stop reason: HOSPADM

## 2024-03-29 RX ORDER — CARBIDOPA AND LEVODOPA 25; 100 MG/1; MG/1
1 TABLET, EXTENDED RELEASE ORAL NIGHTLY
Status: DISCONTINUED | OUTPATIENT
Start: 2024-03-29 | End: 2024-03-31 | Stop reason: HOSPADM

## 2024-03-29 RX ORDER — GLIPIZIDE 10 MG/1
10 TABLET ORAL
Status: DISCONTINUED | OUTPATIENT
Start: 2024-03-30 | End: 2024-03-31 | Stop reason: HOSPADM

## 2024-03-29 RX ORDER — AMLODIPINE BESYLATE 10 MG/1
10 TABLET ORAL DAILY
Status: DISCONTINUED | OUTPATIENT
Start: 2024-03-29 | End: 2024-03-31 | Stop reason: HOSPADM

## 2024-03-29 RX ORDER — FAMOTIDINE 20 MG/1
40 TABLET, FILM COATED ORAL DAILY
Status: DISCONTINUED | OUTPATIENT
Start: 2024-03-29 | End: 2024-03-31 | Stop reason: HOSPADM

## 2024-03-29 RX ORDER — TALC
3 POWDER (GRAM) TOPICAL NIGHTLY
COMMUNITY

## 2024-03-29 RX ORDER — IPRATROPIUM BROMIDE AND ALBUTEROL SULFATE 2.5; .5 MG/3ML; MG/3ML
3 SOLUTION RESPIRATORY (INHALATION) 3 TIMES DAILY
Status: DISCONTINUED | OUTPATIENT
Start: 2024-03-30 | End: 2024-03-31 | Stop reason: HOSPADM

## 2024-03-29 RX ORDER — MONTELUKAST SODIUM 10 MG/1
10 TABLET ORAL NIGHTLY
Status: DISCONTINUED | OUTPATIENT
Start: 2024-03-29 | End: 2024-03-31 | Stop reason: HOSPADM

## 2024-03-29 RX ORDER — FORMOTEROL FUMARATE DIHYDRATE 20 UG/2ML
20 SOLUTION RESPIRATORY (INHALATION)
Status: DISCONTINUED | OUTPATIENT
Start: 2024-03-29 | End: 2024-03-31 | Stop reason: HOSPADM

## 2024-03-29 RX ORDER — ALBUTEROL SULFATE 0.83 MG/ML
2.5 SOLUTION RESPIRATORY (INHALATION) EVERY 2 HOUR PRN
Status: DISCONTINUED | OUTPATIENT
Start: 2024-03-29 | End: 2024-03-31 | Stop reason: HOSPADM

## 2024-03-29 RX ORDER — BUPROPION HYDROCHLORIDE 150 MG/1
150 TABLET, EXTENDED RELEASE ORAL 2 TIMES DAILY
Status: DISCONTINUED | OUTPATIENT
Start: 2024-03-29 | End: 2024-03-31 | Stop reason: HOSPADM

## 2024-03-29 RX ORDER — LANOLIN ALCOHOL/MO/W.PET/CERES
100 CREAM (GRAM) TOPICAL DAILY
Status: DISCONTINUED | OUTPATIENT
Start: 2024-04-01 | End: 2024-03-31 | Stop reason: HOSPADM

## 2024-03-29 RX ORDER — CITALOPRAM 40 MG/1
40 TABLET, FILM COATED ORAL DAILY
Status: DISCONTINUED | OUTPATIENT
Start: 2024-03-30 | End: 2024-03-31 | Stop reason: HOSPADM

## 2024-03-29 RX ORDER — BUDESONIDE 0.5 MG/2ML
0.5 INHALANT ORAL
Status: DISCONTINUED | OUTPATIENT
Start: 2024-03-29 | End: 2024-03-31 | Stop reason: HOSPADM

## 2024-03-29 RX ORDER — ACETAMINOPHEN 160 MG/5ML
650 SOLUTION ORAL EVERY 4 HOURS PRN
Status: DISCONTINUED | OUTPATIENT
Start: 2024-03-29 | End: 2024-03-31 | Stop reason: HOSPADM

## 2024-03-29 RX ORDER — POLYETHYLENE GLYCOL 3350 17 G/17G
17 POWDER, FOR SOLUTION ORAL DAILY PRN
Status: DISCONTINUED | OUTPATIENT
Start: 2024-03-29 | End: 2024-03-31 | Stop reason: HOSPADM

## 2024-03-29 RX ORDER — ACETAMINOPHEN 325 MG/1
650 TABLET ORAL EVERY 4 HOURS PRN
Status: DISCONTINUED | OUTPATIENT
Start: 2024-03-29 | End: 2024-03-31 | Stop reason: HOSPADM

## 2024-03-29 RX ORDER — DEXTROSE 50 % IN WATER (D50W) INTRAVENOUS SYRINGE
25
Status: DISCONTINUED | OUTPATIENT
Start: 2024-03-29 | End: 2024-03-31 | Stop reason: HOSPADM

## 2024-03-29 RX ORDER — BUPROPION HYDROCHLORIDE 150 MG/1
150 TABLET, EXTENDED RELEASE ORAL 2 TIMES DAILY
COMMUNITY

## 2024-03-29 RX ORDER — THIAMINE HYDROCHLORIDE 100 MG/ML
100 INJECTION, SOLUTION INTRAMUSCULAR; INTRAVENOUS DAILY
Status: COMPLETED | OUTPATIENT
Start: 2024-03-29 | End: 2024-03-31

## 2024-03-29 RX ORDER — IPRATROPIUM BROMIDE AND ALBUTEROL SULFATE 2.5; .5 MG/3ML; MG/3ML
9 SOLUTION RESPIRATORY (INHALATION)
Status: DISCONTINUED | OUTPATIENT
Start: 2024-03-29 | End: 2024-03-29

## 2024-03-29 RX ORDER — FLUTICASONE FUROATE AND VILANTEROL 200; 25 UG/1; UG/1
1 POWDER RESPIRATORY (INHALATION)
Status: DISCONTINUED | OUTPATIENT
Start: 2024-03-30 | End: 2024-03-29

## 2024-03-29 RX ORDER — FOLIC ACID 1 MG/1
1 TABLET ORAL DAILY
Status: DISCONTINUED | OUTPATIENT
Start: 2024-03-29 | End: 2024-03-31 | Stop reason: HOSPADM

## 2024-03-29 RX ORDER — ATENOLOL 100 MG/1
100 TABLET ORAL DAILY
Status: DISCONTINUED | OUTPATIENT
Start: 2024-03-30 | End: 2024-03-31 | Stop reason: HOSPADM

## 2024-03-29 RX ORDER — DEXTROSE 50 % IN WATER (D50W) INTRAVENOUS SYRINGE
12.5
Status: DISCONTINUED | OUTPATIENT
Start: 2024-03-29 | End: 2024-03-31 | Stop reason: HOSPADM

## 2024-03-29 RX ORDER — LOSARTAN POTASSIUM 100 MG/1
100 TABLET ORAL DAILY
Status: DISCONTINUED | OUTPATIENT
Start: 2024-03-29 | End: 2024-03-31 | Stop reason: HOSPADM

## 2024-03-29 RX ORDER — ACETAMINOPHEN 650 MG/1
650 SUPPOSITORY RECTAL EVERY 4 HOURS PRN
Status: DISCONTINUED | OUTPATIENT
Start: 2024-03-29 | End: 2024-03-31 | Stop reason: HOSPADM

## 2024-03-29 RX ADMIN — GABAPENTIN 800 MG: 300 CAPSULE ORAL at 22:03

## 2024-03-29 RX ADMIN — BUDESONIDE 0.5 MG: 0.5 INHALANT RESPIRATORY (INHALATION) at 21:56

## 2024-03-29 RX ADMIN — ATORVASTATIN CALCIUM 10 MG: 10 TABLET, FILM COATED ORAL at 22:03

## 2024-03-29 RX ADMIN — IPRATROPIUM BROMIDE AND ALBUTEROL SULFATE 3 ML: 2.5; .5 SOLUTION RESPIRATORY (INHALATION) at 21:56

## 2024-03-29 RX ADMIN — LOSARTAN POTASSIUM 100 MG: 100 TABLET, FILM COATED ORAL at 20:40

## 2024-03-29 RX ADMIN — FORMOTEROL FUMARATE 20 MCG: 20 SOLUTION RESPIRATORY (INHALATION) at 21:56

## 2024-03-29 RX ADMIN — FOLIC ACID 1 MG: 1 TABLET ORAL at 14:20

## 2024-03-29 RX ADMIN — METHYLPREDNISOLONE SODIUM SUCCINATE 125 MG: 125 INJECTION, POWDER, FOR SOLUTION INTRAMUSCULAR; INTRAVENOUS at 14:21

## 2024-03-29 RX ADMIN — Medication 1 TABLET: at 14:20

## 2024-03-29 RX ADMIN — THIAMINE HYDROCHLORIDE 100 MG: 100 INJECTION, SOLUTION INTRAMUSCULAR; INTRAVENOUS at 14:21

## 2024-03-29 RX ADMIN — CARBIDOPA AND LEVODOPA 1 TABLET: 25; 100 TABLET ORAL at 21:16

## 2024-03-29 RX ADMIN — FOLIC ACID 1 MG: 1 TABLET ORAL at 21:15

## 2024-03-29 RX ADMIN — BUPROPION HYDROCHLORIDE 150 MG: 150 TABLET, EXTENDED RELEASE ORAL at 22:03

## 2024-03-29 RX ADMIN — FAMOTIDINE 40 MG: 20 TABLET, FILM COATED ORAL at 21:14

## 2024-03-29 RX ADMIN — IOHEXOL 60 ML: 350 INJECTION, SOLUTION INTRAVENOUS at 13:43

## 2024-03-29 RX ADMIN — INSULIN LISPRO 5 UNITS: 100 INJECTION, SOLUTION INTRAVENOUS; SUBCUTANEOUS at 21:16

## 2024-03-29 RX ADMIN — AMLODIPINE BESYLATE 10 MG: 10 TABLET ORAL at 21:15

## 2024-03-29 RX ADMIN — CARBIDOPA AND LEVODOPA 1 TABLET: 25; 100 TABLET, EXTENDED RELEASE ORAL at 21:00

## 2024-03-29 RX ADMIN — MONTELUKAST 10 MG: 10 TABLET, FILM COATED ORAL at 22:03

## 2024-03-29 RX ADMIN — IPRATROPIUM BROMIDE AND ALBUTEROL SULFATE 9 ML: 2.5; .5 SOLUTION RESPIRATORY (INHALATION) at 14:54

## 2024-03-29 RX ADMIN — FUROSEMIDE 40 MG: 40 TABLET ORAL at 21:16

## 2024-03-29 RX ADMIN — Medication 10 MG: at 22:03

## 2024-03-29 RX ADMIN — ENOXAPARIN SODIUM 40 MG: 40 INJECTION SUBCUTANEOUS at 21:14

## 2024-03-29 RX ADMIN — Medication 1 TABLET: at 21:14

## 2024-03-29 SDOH — SOCIAL STABILITY: SOCIAL INSECURITY: HAS ANYONE EVER THREATENED TO HURT YOUR FAMILY OR YOUR PETS?: NO

## 2024-03-29 SDOH — SOCIAL STABILITY: SOCIAL INSECURITY: DOES ANYONE TRY TO KEEP YOU FROM HAVING/CONTACTING OTHER FRIENDS OR DOING THINGS OUTSIDE YOUR HOME?: NO

## 2024-03-29 SDOH — SOCIAL STABILITY: SOCIAL INSECURITY: DO YOU FEEL UNSAFE GOING BACK TO THE PLACE WHERE YOU ARE LIVING?: NO

## 2024-03-29 SDOH — SOCIAL STABILITY: SOCIAL INSECURITY: DO YOU FEEL ANYONE HAS EXPLOITED OR TAKEN ADVANTAGE OF YOU FINANCIALLY OR OF YOUR PERSONAL PROPERTY?: NO

## 2024-03-29 SDOH — SOCIAL STABILITY: SOCIAL INSECURITY: HAVE YOU HAD THOUGHTS OF HARMING ANYONE ELSE?: NO

## 2024-03-29 SDOH — SOCIAL STABILITY: SOCIAL INSECURITY: ARE THERE ANY APPARENT SIGNS OF INJURIES/BEHAVIORS THAT COULD BE RELATED TO ABUSE/NEGLECT?: NO

## 2024-03-29 SDOH — SOCIAL STABILITY: SOCIAL INSECURITY: WERE YOU ABLE TO COMPLETE ALL THE BEHAVIORAL HEALTH SCREENINGS?: YES

## 2024-03-29 SDOH — SOCIAL STABILITY: SOCIAL INSECURITY: ARE YOU OR HAVE YOU BEEN THREATENED OR ABUSED PHYSICALLY, EMOTIONALLY, OR SEXUALLY BY ANYONE?: NO

## 2024-03-29 SDOH — SOCIAL STABILITY: SOCIAL INSECURITY: ABUSE: ADULT

## 2024-03-29 ASSESSMENT — ENCOUNTER SYMPTOMS
NAUSEA: 0
PALPITATIONS: 0
ACTIVITY CHANGE: 1
SORE THROAT: 0
ABDOMINAL DISTENTION: 0
VOMITING: 0
HEMATURIA: 0
DIFFICULTY URINATING: 0
WEAKNESS: 1
DYSURIA: 0
FREQUENCY: 0
SHORTNESS OF BREATH: 1
WHEEZING: 1
DIARRHEA: 0
DIZZINESS: 0
TREMORS: 1
RHINORRHEA: 0
HEADACHES: 0
CHEST TIGHTNESS: 1
COUGH: 1
FEVER: 0
ABDOMINAL PAIN: 0

## 2024-03-29 ASSESSMENT — PAIN SCALES - GENERAL
PAINLEVEL_OUTOF10: 0 - NO PAIN

## 2024-03-29 ASSESSMENT — COLUMBIA-SUICIDE SEVERITY RATING SCALE - C-SSRS
1. IN THE PAST MONTH, HAVE YOU WISHED YOU WERE DEAD OR WISHED YOU COULD GO TO SLEEP AND NOT WAKE UP?: NO
6. HAVE YOU EVER DONE ANYTHING, STARTED TO DO ANYTHING, OR PREPARED TO DO ANYTHING TO END YOUR LIFE?: NO
2. HAVE YOU ACTUALLY HAD ANY THOUGHTS OF KILLING YOURSELF?: NO

## 2024-03-29 ASSESSMENT — COGNITIVE AND FUNCTIONAL STATUS - GENERAL
PERSONAL GROOMING: A LITTLE
HELP NEEDED FOR BATHING: A LITTLE
MOBILITY SCORE: 20
PATIENT BASELINE BEDBOUND: NO
EATING MEALS: A LITTLE
DRESSING REGULAR LOWER BODY CLOTHING: A LITTLE
CLIMB 3 TO 5 STEPS WITH RAILING: A LITTLE
STANDING UP FROM CHAIR USING ARMS: A LITTLE
WALKING IN HOSPITAL ROOM: A LITTLE
DAILY ACTIVITIY SCORE: 18
MOVING TO AND FROM BED TO CHAIR: A LITTLE
DRESSING REGULAR UPPER BODY CLOTHING: A LITTLE
TOILETING: A LITTLE

## 2024-03-29 ASSESSMENT — LIFESTYLE VARIABLES
HOW OFTEN DO YOU HAVE A DRINK CONTAINING ALCOHOL: NEVER
AUDIT-C TOTAL SCORE: 0
AUDIT-C TOTAL SCORE: 0
EVER HAD A DRINK FIRST THING IN THE MORNING TO STEADY YOUR NERVES TO GET RID OF A HANGOVER: NO
HOW OFTEN DO YOU HAVE 6 OR MORE DRINKS ON ONE OCCASION: NEVER
HAVE YOU EVER FELT YOU SHOULD CUT DOWN ON YOUR DRINKING: NO
HAVE PEOPLE ANNOYED YOU BY CRITICIZING YOUR DRINKING: NO
SUBSTANCE_ABUSE_PAST_12_MONTHS: NO
HOW MANY STANDARD DRINKS CONTAINING ALCOHOL DO YOU HAVE ON A TYPICAL DAY: PATIENT DOES NOT DRINK
PRESCIPTION_ABUSE_PAST_12_MONTHS: NO
SKIP TO QUESTIONS 9-10: 1
EVER FELT BAD OR GUILTY ABOUT YOUR DRINKING: NO
TOTAL SCORE: 0

## 2024-03-29 ASSESSMENT — ACTIVITIES OF DAILY LIVING (ADL)
HEARING - LEFT EAR: FUNCTIONAL
LACK_OF_TRANSPORTATION: NO
ASSISTIVE_DEVICE: EYEGLASSES
DRESSING YOURSELF: INDEPENDENT
TOILETING: INDEPENDENT
HEARING - RIGHT EAR: FUNCTIONAL
PATIENT'S MEMORY ADEQUATE TO SAFELY COMPLETE DAILY ACTIVITIES?: YES
GROOMING: INDEPENDENT
BATHING: INDEPENDENT
WALKS IN HOME: INDEPENDENT
ADEQUATE_TO_COMPLETE_ADL: YES
FEEDING YOURSELF: INDEPENDENT
JUDGMENT_ADEQUATE_SAFELY_COMPLETE_DAILY_ACTIVITIES: YES

## 2024-03-29 ASSESSMENT — PATIENT HEALTH QUESTIONNAIRE - PHQ9
SUM OF ALL RESPONSES TO PHQ9 QUESTIONS 1 & 2: 0
2. FEELING DOWN, DEPRESSED OR HOPELESS: NOT AT ALL
1. LITTLE INTEREST OR PLEASURE IN DOING THINGS: NOT AT ALL

## 2024-03-29 ASSESSMENT — PAIN - FUNCTIONAL ASSESSMENT: PAIN_FUNCTIONAL_ASSESSMENT: 0-10

## 2024-03-29 NOTE — H&P
"History Of Present Illness  Gilberto Albarran is a 63 y.o. male with PMHX  COPD (not on home O2), HARRIETT (wears BIPAP), Parkinson's (with hallucinations), HTN, HLD, morbid obesity, DM type 2 presenting to [facility] from home after PCP visit on 3/29/2024 with SOB and wheezing for 1 week.     Patient explained last Thursday 3/22 he began noticing increased SOB and wheezing with cough that is at times productive of white/yellow sputum, but most of the time is nonproductive, and describes this as his asthmatic type cough. He has been using his advair and singulair as prescribed. He typically does not require his albuterol rescue inhaler, but has needed it 1-2x/day in the last 7 days. The GONZALEZ has progressively worsened to conversational dyspnea today. His wife tells me his wheezing was so loud today she could hear it 2 rooms away. He reports chest \"tightness' but denies CP or palpitations. He also notes  increased swelling in his bilateral legs and is not sure if he has gained weight. He reports orthopnea and has been using his BIPAP at night. He explains that he has fallen multiple times recently because of weakness in his legs and it takes a lot of effort to get up. He denies, fever, headache, vision changes, sore throat, running nose, rashes, wounds, N/V/D, dysuria.     His wife stated he has been having a lot of hallucinations (from his parkinson's) and he had a fall yesterday where he had most likely been down for greater than 1 hour, he was wedged in between the bed and the wall where he was disoriented, only A&Ox1 for the majority of the day until the evening, where he was A&Ox2 but still lethargic.     In ER he was ambulating and was desating to 84% on RA, given breathing treatments and Solu-Medrol. , d-dimer 2066, creat 1.37. CT PE - negative,    Pulmonary History    He explains that he tends to have these exacerbations when the weather changes quickly from hot to cold and has the greatest issues when it is warm " outside in the summer. He is also triggered by allergy to cats, and denies recent exposure. On a good day, he is short of breath, but is able to go grocery shopping, cook his wife meals and do his normal ADLs. He explains that he does not usually have a cough and if he does it is nonproductive. He says that he usually sleeps on his side because laying on his back makes him more short of breath, he also wears a BIPAP at night. Uses Advair 2x daily, has albuterol rescue inhaler that he uses as needed. He says that he quit smoking 42 years ago, but smoked for 5 years before quitting @ 21 years old. He was seeing a pulmonologist, but they retired so he is being followed by his PCP.  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         Past Medical History  He has a past medical history of Chronic obstructive pulmonary disease, unspecified (CMS/[facility]) (01/20/2022), Daily consumption of alcohol, Diplopia (12/04/2020), Esophageal reflux, Essential (primary) hypertension, Essential hypertension, History of rib fracture, Hyperlipidemia, mixed, Insomnia, MDD (major depressive disorder), Morbid obesity (CMS/[facility]), HARRIETT (obstructive sleep apnea), Parkinson disease, Slow transit constipation, Type 2 diabetes mellitus without complications (CMS/[facility]) (01/20/2022), Unspecified exotropia (12/04/2020), Vitamin B12 deficiency, and Vitamin D deficiency.    Surgical History  He has a past surgical history that includes Other surgical history (09/04/2020).     Social History  He reports that he has quit smoking. His smoking use included cigarettes. He smoked an average of 1 pack per day. He does not have any smokeless tobacco history on file. He reports current alcohol use of about 5.0 standard drinks of alcohol per week. He reports that he does not use drugs.    Family History  Family History   Problem Relation Name Age of Onset    Cataracts Mother      Other (HTN, DM) Mother       Parkinsonism Father      Colon cancer Other          Allergies  Patient has no known allergies.    Review of Systems   Constitutional:  Positive for activity change. Negative for fever.   HENT:  Negative for congestion, rhinorrhea and sore throat.    Eyes:  Positive for visual disturbance.        Double vision @ baseline   Respiratory:  Positive for cough, chest tightness, shortness of breath and wheezing.    Cardiovascular:  Positive for leg swelling. Negative for chest pain and palpitations.   Gastrointestinal:  Negative for abdominal distention, abdominal pain, diarrhea, nausea and vomiting.   Endocrine: Negative for polyuria.   Genitourinary:  Negative for difficulty urinating, dysuria, frequency and hematuria.   Musculoskeletal:  Positive for gait problem.   Skin:  Negative for rash.   Neurological:  Positive for tremors and weakness. Negative for dizziness and headaches.        Parkinson tremors         Physical Exam  Vitals reviewed.   Constitutional:       General: He is not in acute distress.     Appearance: Normal appearance. He is obese.   HENT:      Head: Normocephalic.      Right Ear: External ear normal.      Left Ear: External ear normal.      Nose: Nose normal.      Mouth/Throat:      Mouth: Mucous membranes are dry.   Eyes:      General:         Right eye: No discharge.         Left eye: No discharge.      Extraocular Movements: Extraocular movements intact.      Pupils: Pupils are equal, round, and reactive to light.   Cardiovascular:      Rate and Rhythm: Normal rate and regular rhythm.      Pulses: Normal pulses.      Heart sounds: Normal heart sounds. No murmur heard.     No friction rub. No gallop.   Pulmonary:      Breath sounds: Normal breath sounds. No wheezing.   Abdominal:      General: Bowel sounds are normal.      Palpations: Abdomen is soft.      Tenderness: There is no guarding.   Musculoskeletal:      Cervical back: Normal range of motion.      Right lower leg: Edema present.       "Left lower leg: Edema present.      Comments: Tremors      Skin:     General: Skin is warm and dry.      Capillary Refill: Capillary refill takes less than 2 seconds.      Findings: Bruising present.   Neurological:      Mental Status: He is alert and oriented to person, place, and time.      Motor: Weakness present.      Gait: Gait abnormal.      Comments: Tremors           Last Recorded Vitals  Blood pressure (!) 170/96, pulse 88, temperature 37.2 °C (99 °F), temperature source Temporal, resp. rate 18, height 1.753 m (5' 9\"), weight 135 kg (297 lb), SpO2 95 %.  Visit Vitals  BP (!) 170/96   Pulse 88   Temp 37.2 °C (99 °F) (Temporal)   Resp 18   Ht 1.753 m (5' 9\")   Wt 135 kg (297 lb)   SpO2 95%   BMI 43.86 kg/m²   Smoking Status Former   BSA 2.56 m²     Weight: 135 kg (297 lb)   Pain Assessment: 0-10  Pain Score: 0 - No pain        Relevant Results  Results for orders placed or performed during the hospital encounter of 03/29/24 (from the past 24 hour(s))   CBC with Differential   Result Value Ref Range    WBC 8.4 4.4 - 11.3 x10*3/uL    nRBC 0.0 0.0 - 0.0 /100 WBCs    RBC 3.48 (L) 4.50 - 5.90 x10*6/uL    Hemoglobin 10.7 (L) 13.5 - 17.5 g/dL    Hematocrit 33.2 (L) 41.0 - 52.0 %    MCV 95 80 - 100 fL    MCH 30.7 26.0 - 34.0 pg    MCHC 32.2 32.0 - 36.0 g/dL    RDW 13.5 11.5 - 14.5 %    Platelets 222 150 - 450 x10*3/uL    Neutrophils % 72.8 40.0 - 80.0 %    Immature Granulocytes %, Automated 0.2 0.0 - 0.9 %    Lymphocytes % 11.6 13.0 - 44.0 %    Monocytes % 12.9 2.0 - 10.0 %    Eosinophils % 1.9 0.0 - 6.0 %    Basophils % 0.6 0.0 - 2.0 %    Neutrophils Absolute 6.10 1.20 - 7.70 x10*3/uL    Immature Granulocytes Absolute, Automated 0.02 0.00 - 0.70 x10*3/uL    Lymphocytes Absolute 0.97 (L) 1.20 - 4.80 x10*3/uL    Monocytes Absolute 1.08 (H) 0.10 - 1.00 x10*3/uL    Eosinophils Absolute 0.16 0.00 - 0.70 x10*3/uL    Basophils Absolute 0.05 0.00 - 0.10 x10*3/uL   Comprehensive Metabolic Panel   Result Value Ref Range    " Glucose 70 (L) 74 - 99 mg/dL    Sodium 135 (L) 136 - 145 mmol/L    Potassium 4.2 3.5 - 5.3 mmol/L    Chloride 99 98 - 107 mmol/L    Bicarbonate 28 21 - 32 mmol/L    Anion Gap 12 10 - 20 mmol/L    Urea Nitrogen 21 6 - 23 mg/dL    Creatinine 1.37 (H) 0.50 - 1.30 mg/dL    eGFR 58 (L) >60 mL/min/1.73m*2    Calcium 9.3 8.6 - 10.3 mg/dL    Albumin 4.1 3.4 - 5.0 g/dL    Alkaline Phosphatase 93 33 - 136 U/L    Total Protein 6.9 6.4 - 8.2 g/dL    AST 32 9 - 39 U/L    Bilirubin, Total 0.6 0.0 - 1.2 mg/dL    ALT 15 10 - 52 U/L   Brain Natriuretic Peptide   Result Value Ref Range     (H) 0 - 99 pg/mL   Protime-INR   Result Value Ref Range    Protime 11.5 9.8 - 12.8 seconds    INR 1.0 0.9 - 1.1   Troponin I, High Sensitivity, Initial   Result Value Ref Range    Troponin I, High Sensitivity 19 0 - 20 ng/L   D-dimer, VTE Exclusion   Result Value Ref Range    D-Dimer, Quantitative VTE Exclusion 2,066 (H) <=500 ng/mL FEU   Sars-CoV-2 and Influenza A/B PCR   Result Value Ref Range    Flu A Result Not Detected Not Detected    Flu B Result Not Detected Not Detected    Coronavirus 2019, PCR Not Detected Not Detected   Troponin, High Sensitivity, 1 Hour   Result Value Ref Range    Troponin I, High Sensitivity 18 0 - 20 ng/L      CT angio chest for pulmonary embolism    Result Date: 3/29/2024  STUDY: CT Angiogram of the Chest; 03/29/2024, 1:44 PM. INDICATION: Hypoxia ,shortness of breath, elevate D-dimer. COMPARISON: CXR: 03/29/2024. ACCESSION NUMBER(S): [id number] ORDERING CLINICIAN: DARCY CLEVELAND TECHNIQUE:  CTA of the chest was performed with intravenous contrast. Images are reviewed and processed at a workstation according to the CT angiogram protocol with 3-D and/or MIP post processing imaging generated.  Omnipaque 350 60 mL was administered intravenously. Automated mA/kV exposure control was utilized and patient examination was performed in strict accordance with principles of ALARA. FINDINGS: Pulmonary arteries are  adequately opacified without acute or chronic filling defects.  The thoracic aorta is normal in course and caliber without dissection or aneurysm. Atherosclerosis of the coronary arteries is present.  The heart is mildly enlarged without pericardial effusion.  Prominent mediastinal lymph nodes are present, likely reactive. Representative subcarinal lymph node measures 1.8 cm short axis dimension. Mild atelectatic changes are present.  Trace bilateral pleural effusions are present. There is no pneumothorax.  The airways are patent. No consolidation, interstitial disease, or suspicious nodules. Low-attenuation lesion about the anterior aspect of the LEFT hepatic lobe measures 3.2 cm in size, most likely cysts. There are no acute fractures.  No suspicious bony lesions.  Senescent changes of the thoracic spine are present.      1. There is no evidence of pulmonary embolus. 2. Mild cardiomegaly. Atherosclerosis of the coronary arteries is present. Trace bilateral pleural effusions are present. 3. Mediastinal adenopathy, likely reactive. Signed by Danny Gtz II, MD    XR chest 1 view    Result Date: 3/29/2024  STUDY: Chest Radiograph;  3/29/2024 12:34 PM. INDICATION: Chest pain. COMPARISON: None Available. ACCESSION NUMBER(S): [id number] ORDERING CLINICIAN: DARCY CLEVELAND TECHNIQUE:  Frontal chest was obtained at 12:34 hours. FINDINGS: CARDIOMEDIASTINAL SILHOUETTE: Cardiomediastinal silhouette is normal in size and configuration.  LUNGS: Lungs are clear.  ABDOMEN: No remarkable upper abdominal findings.  BONES: No acute osseous changes.  Postoperative changes of the right shoulder.    No acute cardiopulmonary abnormality. Signed by Sae Garcia MD     No results found for this or any previous visit (from the past 4464 hour(s)).    Home Medications  Prior to Admission medications    Medication Sig Start Date End Date Taking? Authorizing Provider   albuterol 90 mcg/actuation inhaler Inhale 2 puffs every 4 hours  if needed for wheezing or shortness of breath. 12/3/20   Historical Provider, MD   amLODIPine (Norvasc) 10 mg tablet Take 1 tablet (10 mg) by mouth once daily. 1/9/18   Historical Provider, MD   atenolol (Tenormin) 100 mg tablet Take 1 tablet (100 mg) by mouth once daily. 7/12/21   Historical Provider, MD   atorvastatin (Lipitor) 10 mg tablet Take 1 tablet (10 mg) by mouth once daily at bedtime. 1/9/18   Historical Provider, MD   buPROPion SR (Wellbutrin SR) 150 mg 12 hr tablet Take 1 tablet (150 mg) by mouth 2 times a day. Do not crush, chew, or split.    Historical Provider, MD   carbidopa-levodopa (Sinemet CR)  mg ER tablet Take 1 tablet by mouth once daily at bedtime. Do not crush, chew, or split. 12/19/23 12/18/24  Ann Marie Dewitt MD   carbidopa-levodopa (Sinemet)  mg tablet TAKE 1 TABLET BY MOUTH 3 TIMES DAILY 12/26/23   Sherrill Mina MD   citalopram (CeleXA) 40 mg tablet Take 1 tablet (40 mg) by mouth once daily. 1/9/18   Historical Provider, MD   ergocalciferol (Vitamin D-2) 1.25 MG (00983 UT) capsule Take 1 capsule (50,000 Units) by mouth 1 (one) time per week. Saturday 5/17/21   Historical Provider, MD   famotidine (Pepcid) 40 mg tablet Take 1 tablet (40 mg) by mouth once daily. As directed 1/9/18   Historical Provider, MD   fluticasone propion-salmeteroL (Advair Diskus) 500-50 mcg/dose diskus inhaler Inhale 1 puff 2 times a day. 1/9/18   Historical Provider, MD   furosemide (Lasix) 40 mg tablet Take 1 tablet (40 mg) by mouth 2 times a day. 1/9/18   Historical Provider, MD   gabapentin (Neurontin) 800 mg tablet Take 1 tablet (800 mg) by mouth 3 times a day. 12/22/20   Historical Provider, MD   glipiZIDE (Glucotrol) 10 mg tablet Take 1 tablet (10 mg) by mouth 2 times a day. 1/9/18   Historical Provider, MD   ketoconazole (NIZOral) 2 % shampoo Apply topically every other day.  Patient taking differently: Apply 1 Application topically every other day. 2/19/24   ALYX Coppola-CNP    losartan (Cozaar) 100 mg tablet Take 1 tablet (100 mg) by mouth once daily. 12/18/20   Historical Provider, MD   melatonin 10 mg tablet Take 1 tablet (10 mg) by mouth once daily at bedtime.    Historical Provider, MD   metFORMIN (Glucophage) 1,000 mg tablet Take 1 tablet (1,000 mg) by mouth 2 times a day. 1/9/18   Historical Provider, MD   montelukast (Singulair) 10 mg tablet Take 1 tablet (10 mg) by mouth once daily at bedtime. 1/9/18   Historical Provider, MD   semaglutide (Ozempic) 1 mg/dose (4 mg/3 mL) pen injector Inject 1 mg under the skin every 7 days. Wednesday    Historical Provider, MD   sildenafil (Viagra) 100 mg tablet Take 1 tablet (100 mg) by mouth if needed for erectile dysfunction. 12/22/20   Historical Provider, MD   SITagliptin phosphate (Januvia) 100 mg tablet Take 1 tablet (100 mg) by mouth once daily. 1/9/18   Historical Provider, MD       Medications  Scheduled medications  amLODIPine, 10 mg, oral, Daily  atenolol, 100 mg, oral, Daily  atorvastatin, 10 mg, oral, Nightly  buPROPion SR, 150 mg, oral, BID  carbidopa-levodopa, 1 tablet, oral, Nightly  carbidopa-levodopa, 1 tablet, oral, TID  citalopram, 40 mg, oral, Daily  enoxaparin, 40 mg, subcutaneous, q12h YANIQUE  famotidine, 40 mg, oral, Daily  [START ON 3/30/2024] fluticasone furoate-vilanteroL, 1 puff, inhalation, Daily  folic acid, 1 mg, oral, Daily  furosemide, 40 mg, oral, BID  gabapentin, 800 mg, oral, TID  glipiZIDE, 10 mg, oral, BID  insulin lispro, 0-5 Units, subcutaneous, 4x daily  ipratropium-albuteroL, 3 mL, nebulization, q6h  losartan, 100 mg, oral, Daily  melatonin, 10 mg, oral, Nightly  montelukast, 10 mg, oral, Nightly  multivitamin with minerals, 1 tablet, oral, Daily  [START ON 3/30/2024] predniSONE, 40 mg, oral, Daily  SITagliptin phosphate, 100 mg, oral, Daily  [START ON 4/1/2024] thiamine, 100 mg, oral, Daily  thiamine, 100 mg, intravenous, Daily      Continuous medications     PRN medications        Assessment/Plan    Principal Problem:    Acute hypoxic respiratory failure (CMS/HCC)  Active Problems:    Asthma    HARRIETT treated with BiPAP    COPD exacerbation (CMS/HCC)    Parkinson disease    Fall    Type 2 diabetes mellitus (CMS/HCC)    Diastolic dysfunction    Daily consumption of alcohol       Plan:     Asthma/COPD/Acute Hypoxia/Sleep apnea: Oxygen maintain pulse ox > 90. Currently wheezing resolved after IV solumedrol, will give prednisone 40mg PO x 5 days, duonebs, ICS, singulair and home ICS with bronchodilators.  Home BIPAP at night. Monitor pulse ox.     Diastolic dysfunction, Increased Leg Edema: , weight gain, daily weights, echo, strict I/o and daily weight, consider cardiology consult depending on findings. Telemetry monitoring.     Alcohol abuse: CIWA, thiamine, MVI and folic acid.     Parkinsons: continue parkinson's medications    DM: ACHS Accucheck, Diabetic diet,     Fall: PT/OT, fall precautions, bed alarm, chair alarm    CODE STATUS: Full Code per discussion with pt and wife.       Case/Plan discussed with Carmen ROSE    Further evaluation and management per attending and consulting physicians.      This note has been transcribed using Dragon voice recognition system and there is a possibility of unintentional typing misprints.  Any information found to be copied from previous providers is done in the best interest of the patient to provide accurate, quality, and continuity of care.         ROSE Saeed,   [facility]. House

## 2024-03-29 NOTE — ED PROVIDER NOTES
"HPI   Chief Complaint   Patient presents with    Shortness of Breath     Hx of COPD and CHF       Patient is a 63-year-old male with past medical history significant for COPD (asthma type not on oxygen), HARRIETT (BiPAP nightly), NIDDM 2, HTN, HLD, obesity, lymphedema, Parkinson's (with hallucinations,).  Patient presents to [facility] ED by PCP referral (Dr. Rivas) for increasing wheezing and shortness of breath over the last week.  Patient also notes that he has had a cough productive of white/yellow sputum.  Patient also endorses associated sore throat.  Patient denies palpitations, lightheadedness, dizziness, chest pain, abdominal pain, nausea/vomiting/diarrhea.  Of note patient had a fall yesterday where he was wedged in between bed and wall for approximately 1.5 hours after which \"he was A&O X1-2 for the remainder of the day according to his wife who is a PA\", and she stated that \"I did an NIH on him yesterday multiple times and it was fine each time\".  Patient also endorses increasing swelling in the lower extremities bilaterally up to the level of the mid thigh with the left more so than the right.    Patient denies all other symptomatology pertaining to 12 point ROS with exception to those listed above HPI.    PMH: COPD (asthma type not on home oxygen), HARRIETT (on BiPAP), NIDDM 2, HTN, HLD, obesity, lymphedema, Parkinson's (with hallucinations).  Allergies: NKA  FH: Noncontributory  SX H: Rotator cuff repair on the right, removal of a neck nodule  SH: Patient is a former smoker with a 10-pack-year history, denies illicit substance use, endorses 5 drinks per night.      History provided by:  Patient   used: No                        No data recorded                   Patient History   Past Medical History:   Diagnosis Date    Chronic obstructive pulmonary disease, unspecified (CMS/[facility]) 01/20/2022    Chronic obstructive pulmonary disease, unspecified COPD type    Diplopia 12/04/2020    Diplopia    " Essential (primary) hypertension     HTN (hypertension)    Hyperlipidemia, unspecified     Hyperlipidemia    Type 2 diabetes mellitus without complications (CMS/[facility]) 01/20/2022    Diabetes mellitus    Unspecified exotropia 12/04/2020    Exotropia     Past Surgical History:   Procedure Laterality Date    OTHER SURGICAL HISTORY  09/04/2020    Rotator cuff repair     Family History   Problem Relation Name Age of Onset    Cataracts Mother       Social History     Tobacco Use    Smoking status: Former     Packs/day: 1     Types: Cigarettes    Smokeless tobacco: Not on file   Substance Use Topics    Alcohol use: Yes     Alcohol/week: 2.0 standard drinks of alcohol     Types: 2 Shots of liquor per week    Drug use: Never       Physical Exam   ED Triage Vitals [03/29/24 1203]   Temperature Heart Rate Respirations BP   36.9 °C (98.4 °F) 97 20 (!) 205/90      SpO2 Temp Source Heart Rate Source Patient Position   -- Temporal Monitor Lying      BP Location FiO2 (%)     Right arm --       Physical Exam  General: Not in acute distress, A&O x 3, alert, cooperative, well-developed  HEENT: Normocephalic, atraumatic, EOMI, moist mucous membranes  Neck: Neck supple, trachea midline, no evidence of trauma  Cardiovascular: RRR, S1 and S2 appreciated, no murmurs rubs gallops appreciated, distal pulses 2+ bilaterally (radial)  Respiratory: Vesicular breath sounds appreciated bilaterally, no adventitious sounds appreciated, no increased work of breathing on room air  GI: Abdomen soft, protuberant, nondistended, nontender to palpation, bowel sounds present  Extremities: Unable to appreciate edema in lower extremities bilaterally secondary to tightness of skin all the way to the level of the mid thigh. no cyanosis, no erythema  Neuro: A&O X3, no focal deficits, strength and sensation intact bilaterally  Skin: Warm and dry, without lesions or rashes    ED Course & MDM   Diagnoses as of 03/29/24 1538   COPD exacerbation (CMS/[facility])   Hypoxia        Medical Decision Making  Patient is a 63-year-old male who was referred to [facility] ED by PCP (Evelyn) for chest x-ray for wheezing and shortness of breath for approximately 1 week.  Patient also endorsed increased swelling in the lower extremities.  The fall mentioned in the HPI with associated drop in mentation afterwards is likely a CO2 narcosis state due to positioning relayed by the wife.  To evaluate the shortness of breath a CBC, CMP, BNP, D-dimer, troponin, ECG, PT/INR, COVID/flu PCR, and CXR have been ordered.  Patient will be given DuoNebs and Solu-Medrol for likely COPDE.  Patient will also be placed on CIWA protocol due to alcohol usage at home and that patient does not remember a time when he has not drank for more than 3 days. CBC remarkable for mild anemia hemoglobin 10.7, BNP mildly elevated at 374, D-dimer markedly elevated at 2066, CMP notable for elevated creatinine at 1.37.  To evaluate the elevated D-dimer with shortness of breath a CT angio for pulmonary embolism has been ordered.  CT illustrated no pulmonary embolism, or current acute pathologies.  Patient was ambulated after breathing treatments and desaturated to 84% on room air.  The patient is to be admitted for hypoxia versus COPD exacerbation.  The patient will be admitted under the general medicine service under the care of Dr. Rivas. Dr. Rivas has already accepted the patient.  The entirety of this case has been discussed with Dr. Nowak.    Amount and/or Complexity of Data Reviewed  ECG/medicine tests: independent interpretation performed.     Details: Sinus rhythm with PVC and right bundle branch block, rate 82 bpm, QTc 507, no evidence of acute ischemic injury.        Procedure  Procedures     Cleveland Shaw MD  Resident  03/29/24 1442

## 2024-03-30 ENCOUNTER — APPOINTMENT (OUTPATIENT)
Dept: CARDIOLOGY | Facility: HOSPITAL | Age: 64
End: 2024-03-30
Payer: COMMERCIAL

## 2024-03-30 LAB
ANION GAP SERPL CALC-SCNC: 11 MMOL/L (ref 10–20)
AORTIC VALVE PEAK VELOCITY: 1.53 M/S
AV PEAK GRADIENT: 9.4 MMHG
AVA (PEAK VEL): 1.26 CM2
BUN SERPL-MCNC: 27 MG/DL (ref 6–23)
CALCIUM SERPL-MCNC: 8.6 MG/DL (ref 8.6–10.3)
CHLORIDE SERPL-SCNC: 98 MMOL/L (ref 98–107)
CO2 SERPL-SCNC: 29 MMOL/L (ref 21–32)
CREAT SERPL-MCNC: 1.4 MG/DL (ref 0.5–1.3)
EGFRCR SERPLBLD CKD-EPI 2021: 56 ML/MIN/1.73M*2
EJECTION FRACTION APICAL 4 CHAMBER: 49.8
ERYTHROCYTE [DISTWIDTH] IN BLOOD BY AUTOMATED COUNT: 13.2 % (ref 11.5–14.5)
GLUCOSE BLD MANUAL STRIP-MCNC: 235 MG/DL (ref 74–99)
GLUCOSE BLD MANUAL STRIP-MCNC: 266 MG/DL (ref 74–99)
GLUCOSE BLD MANUAL STRIP-MCNC: 268 MG/DL (ref 74–99)
GLUCOSE BLD MANUAL STRIP-MCNC: 376 MG/DL (ref 74–99)
GLUCOSE SERPL-MCNC: 321 MG/DL (ref 74–99)
HCT VFR BLD AUTO: 30.6 % (ref 41–52)
HGB BLD-MCNC: 9.8 G/DL (ref 13.5–17.5)
LEFT VENTRICLE INTERNAL DIMENSION DIASTOLE: 6.19 CM (ref 3.5–6)
LEFT VENTRICULAR OUTFLOW TRACT DIAMETER: 1.76 CM
MCH RBC QN AUTO: 30.9 PG (ref 26–34)
MCHC RBC AUTO-ENTMCNC: 32 G/DL (ref 32–36)
MCV RBC AUTO: 97 FL (ref 80–100)
MITRAL VALVE E/A RATIO: 2.51
NRBC BLD-RTO: 0 /100 WBCS (ref 0–0)
PLATELET # BLD AUTO: 207 X10*3/UL (ref 150–450)
POTASSIUM SERPL-SCNC: 4.2 MMOL/L (ref 3.5–5.3)
RBC # BLD AUTO: 3.17 X10*6/UL (ref 4.5–5.9)
RIGHT VENTRICLE FREE WALL PEAK S': 11 CM/S
RIGHT VENTRICLE PEAK SYSTOLIC PRESSURE: 46.1 MMHG
SODIUM SERPL-SCNC: 134 MMOL/L (ref 136–145)
TRICUSPID ANNULAR PLANE SYSTOLIC EXCURSION: 1.7 CM
WBC # BLD AUTO: 5.2 X10*3/UL (ref 4.4–11.3)

## 2024-03-30 PROCEDURE — 2500000004 HC RX 250 GENERAL PHARMACY W/ HCPCS (ALT 636 FOR OP/ED)

## 2024-03-30 PROCEDURE — 93306 TTE W/DOPPLER COMPLETE: CPT | Performed by: INTERNAL MEDICINE

## 2024-03-30 PROCEDURE — 2500000001 HC RX 250 WO HCPCS SELF ADMINISTERED DRUGS (ALT 637 FOR MEDICARE OP)

## 2024-03-30 PROCEDURE — 2500000002 HC RX 250 W HCPCS SELF ADMINISTERED DRUGS (ALT 637 FOR MEDICARE OP, ALT 636 FOR OP/ED): Performed by: NURSE PRACTITIONER

## 2024-03-30 PROCEDURE — 94660 CPAP INITIATION&MGMT: CPT

## 2024-03-30 PROCEDURE — G0378 HOSPITAL OBSERVATION PER HR: HCPCS

## 2024-03-30 PROCEDURE — 82947 ASSAY GLUCOSE BLOOD QUANT: CPT | Mod: 59

## 2024-03-30 PROCEDURE — 93306 TTE W/DOPPLER COMPLETE: CPT

## 2024-03-30 PROCEDURE — 94640 AIRWAY INHALATION TREATMENT: CPT

## 2024-03-30 PROCEDURE — 36415 COLL VENOUS BLD VENIPUNCTURE: CPT | Performed by: NURSE PRACTITIONER

## 2024-03-30 PROCEDURE — 85027 COMPLETE CBC AUTOMATED: CPT | Performed by: NURSE PRACTITIONER

## 2024-03-30 PROCEDURE — 2500000004 HC RX 250 GENERAL PHARMACY W/ HCPCS (ALT 636 FOR OP/ED): Performed by: NURSE PRACTITIONER

## 2024-03-30 PROCEDURE — 96372 THER/PROPH/DIAG INJ SC/IM: CPT | Performed by: NURSE PRACTITIONER

## 2024-03-30 PROCEDURE — 97161 PT EVAL LOW COMPLEX 20 MIN: CPT | Mod: GP

## 2024-03-30 PROCEDURE — 2500000001 HC RX 250 WO HCPCS SELF ADMINISTERED DRUGS (ALT 637 FOR MEDICARE OP): Performed by: NURSE PRACTITIONER

## 2024-03-30 PROCEDURE — 96376 TX/PRO/DX INJ SAME DRUG ADON: CPT

## 2024-03-30 PROCEDURE — 2500000001 HC RX 250 WO HCPCS SELF ADMINISTERED DRUGS (ALT 637 FOR MEDICARE OP): Performed by: INTERNAL MEDICINE

## 2024-03-30 PROCEDURE — 82374 ASSAY BLOOD CARBON DIOXIDE: CPT | Performed by: NURSE PRACTITIONER

## 2024-03-30 PROCEDURE — 97165 OT EVAL LOW COMPLEX 30 MIN: CPT | Mod: GO | Performed by: OCCUPATIONAL THERAPIST

## 2024-03-30 RX ORDER — DOXYCYCLINE 100 MG/1
100 CAPSULE ORAL EVERY 12 HOURS SCHEDULED
Status: DISCONTINUED | OUTPATIENT
Start: 2024-03-30 | End: 2024-03-31 | Stop reason: HOSPADM

## 2024-03-30 RX ORDER — AZITHROMYCIN 500 MG/1
500 TABLET, FILM COATED ORAL
Status: DISCONTINUED | OUTPATIENT
Start: 2024-03-30 | End: 2024-03-30

## 2024-03-30 RX ORDER — INSULIN LISPRO 100 [IU]/ML
0-10 INJECTION, SOLUTION INTRAVENOUS; SUBCUTANEOUS
Status: DISCONTINUED | OUTPATIENT
Start: 2024-03-30 | End: 2024-03-31 | Stop reason: HOSPADM

## 2024-03-30 RX ADMIN — ENOXAPARIN SODIUM 40 MG: 40 INJECTION SUBCUTANEOUS at 20:55

## 2024-03-30 RX ADMIN — IPRATROPIUM BROMIDE AND ALBUTEROL SULFATE 3 ML: 2.5; .5 SOLUTION RESPIRATORY (INHALATION) at 20:40

## 2024-03-30 RX ADMIN — INSULIN LISPRO 10 UNITS: 100 INJECTION, SOLUTION INTRAVENOUS; SUBCUTANEOUS at 17:15

## 2024-03-30 RX ADMIN — ATORVASTATIN CALCIUM 10 MG: 10 TABLET, FILM COATED ORAL at 20:59

## 2024-03-30 RX ADMIN — Medication 1 TABLET: at 09:28

## 2024-03-30 RX ADMIN — FORMOTEROL FUMARATE 20 MCG: 20 SOLUTION RESPIRATORY (INHALATION) at 20:40

## 2024-03-30 RX ADMIN — AMLODIPINE BESYLATE 10 MG: 10 TABLET ORAL at 09:27

## 2024-03-30 RX ADMIN — BUPROPION HYDROCHLORIDE 150 MG: 150 TABLET, EXTENDED RELEASE ORAL at 09:26

## 2024-03-30 RX ADMIN — GABAPENTIN 800 MG: 300 CAPSULE ORAL at 14:57

## 2024-03-30 RX ADMIN — THIAMINE HYDROCHLORIDE 100 MG: 100 INJECTION, SOLUTION INTRAMUSCULAR; INTRAVENOUS at 11:15

## 2024-03-30 RX ADMIN — FUROSEMIDE 60 MG: 40 TABLET ORAL at 20:59

## 2024-03-30 RX ADMIN — FORMOTEROL FUMARATE 20 MCG: 20 SOLUTION RESPIRATORY (INHALATION) at 09:11

## 2024-03-30 RX ADMIN — Medication 10 MG: at 20:56

## 2024-03-30 RX ADMIN — LOSARTAN POTASSIUM 100 MG: 100 TABLET, FILM COATED ORAL at 09:26

## 2024-03-30 RX ADMIN — FAMOTIDINE 40 MG: 20 TABLET, FILM COATED ORAL at 09:26

## 2024-03-30 RX ADMIN — IPRATROPIUM BROMIDE AND ALBUTEROL SULFATE 3 ML: 2.5; .5 SOLUTION RESPIRATORY (INHALATION) at 09:11

## 2024-03-30 RX ADMIN — MONTELUKAST 10 MG: 10 TABLET, FILM COATED ORAL at 20:57

## 2024-03-30 RX ADMIN — BUDESONIDE 0.5 MG: 0.5 INHALANT RESPIRATORY (INHALATION) at 20:40

## 2024-03-30 RX ADMIN — INSULIN LISPRO 6 UNITS: 100 INJECTION, SOLUTION INTRAVENOUS; SUBCUTANEOUS at 22:37

## 2024-03-30 RX ADMIN — ATENOLOL 100 MG: 100 TABLET ORAL at 09:27

## 2024-03-30 RX ADMIN — PREDNISONE 40 MG: 20 TABLET ORAL at 09:25

## 2024-03-30 RX ADMIN — FUROSEMIDE 40 MG: 40 TABLET ORAL at 09:26

## 2024-03-30 RX ADMIN — SITAGLIPTIN 100 MG: 100 TABLET, FILM COATED ORAL at 09:27

## 2024-03-30 RX ADMIN — GABAPENTIN 800 MG: 300 CAPSULE ORAL at 20:58

## 2024-03-30 RX ADMIN — CITALOPRAM 40 MG: 40 TABLET, FILM COATED ORAL at 09:28

## 2024-03-30 RX ADMIN — GLIPIZIDE 10 MG: 10 TABLET ORAL at 09:28

## 2024-03-30 RX ADMIN — GLIPIZIDE 10 MG: 10 TABLET ORAL at 17:02

## 2024-03-30 RX ADMIN — FOLIC ACID 1 MG: 1 TABLET ORAL at 09:27

## 2024-03-30 RX ADMIN — CARBIDOPA AND LEVODOPA 1 TABLET: 25; 100 TABLET ORAL at 09:27

## 2024-03-30 RX ADMIN — GABAPENTIN 800 MG: 300 CAPSULE ORAL at 09:25

## 2024-03-30 RX ADMIN — ENOXAPARIN SODIUM 40 MG: 40 INJECTION SUBCUTANEOUS at 09:28

## 2024-03-30 RX ADMIN — DOXYCYCLINE HYCLATE 100 MG: 100 CAPSULE ORAL at 20:58

## 2024-03-30 RX ADMIN — CARBIDOPA AND LEVODOPA 1 TABLET: 25; 100 TABLET ORAL at 14:57

## 2024-03-30 RX ADMIN — BUDESONIDE 0.5 MG: 0.5 INHALANT RESPIRATORY (INHALATION) at 09:11

## 2024-03-30 RX ADMIN — CARBIDOPA AND LEVODOPA 1 TABLET: 25; 100 TABLET, EXTENDED RELEASE ORAL at 21:01

## 2024-03-30 RX ADMIN — INSULIN LISPRO 4 UNITS: 100 INJECTION, SOLUTION INTRAVENOUS; SUBCUTANEOUS at 12:29

## 2024-03-30 RX ADMIN — BUPROPION HYDROCHLORIDE 150 MG: 150 TABLET, EXTENDED RELEASE ORAL at 21:06

## 2024-03-30 RX ADMIN — INSULIN LISPRO 6 UNITS: 100 INJECTION, SOLUTION INTRAVENOUS; SUBCUTANEOUS at 09:28

## 2024-03-30 RX ADMIN — DOXYCYCLINE HYCLATE 100 MG: 100 CAPSULE ORAL at 10:50

## 2024-03-30 ASSESSMENT — LIFESTYLE VARIABLES
PAROXYSMAL SWEATS: NO SWEAT VISIBLE
ANXIETY: NO ANXIETY, AT EASE
HEADACHE, FULLNESS IN HEAD: NOT PRESENT
TREMOR: MODERATE, WITH PATIENT'S ARMS EXTENDED
ORIENTATION AND CLOUDING OF SENSORIUM: ORIENTED AND CAN DO SERIAL ADDITIONS
AUDITORY DISTURBANCES: NOT PRESENT
TREMOR: MODERATE, WITH PATIENT'S ARMS EXTENDED
HEADACHE, FULLNESS IN HEAD: NOT PRESENT
TOTAL SCORE: 4
AGITATION: NORMAL ACTIVITY
TOTAL SCORE: 4
PAROXYSMAL SWEATS: NO SWEAT VISIBLE
ORIENTATION AND CLOUDING OF SENSORIUM: ORIENTED AND CAN DO SERIAL ADDITIONS
VISUAL DISTURBANCES: NOT PRESENT
NAUSEA AND VOMITING: NO NAUSEA AND NO VOMITING
AUDITORY DISTURBANCES: NOT PRESENT
ANXIETY: NO ANXIETY, AT EASE
VISUAL DISTURBANCES: NOT PRESENT
NAUSEA AND VOMITING: NO NAUSEA AND NO VOMITING
AGITATION: NORMAL ACTIVITY

## 2024-03-30 ASSESSMENT — COGNITIVE AND FUNCTIONAL STATUS - GENERAL
TOILETING: A LITTLE
PERSONAL GROOMING: A LITTLE
STANDING UP FROM CHAIR USING ARMS: A LITTLE
DAILY ACTIVITIY SCORE: 19
MOVING TO AND FROM BED TO CHAIR: A LITTLE
WALKING IN HOSPITAL ROOM: A LITTLE
HELP NEEDED FOR BATHING: A LITTLE
CLIMB 3 TO 5 STEPS WITH RAILING: A LITTLE
DRESSING REGULAR UPPER BODY CLOTHING: A LITTLE
MOBILITY SCORE: 20
DRESSING REGULAR LOWER BODY CLOTHING: A LITTLE

## 2024-03-30 ASSESSMENT — PAIN - FUNCTIONAL ASSESSMENT: PAIN_FUNCTIONAL_ASSESSMENT: 0-10

## 2024-03-30 ASSESSMENT — PAIN SCALES - GENERAL
PAINLEVEL_OUTOF10: 0 - NO PAIN
PAINLEVEL_OUTOF10: 0 - NO PAIN

## 2024-03-30 NOTE — PROGRESS NOTES
Physical Therapy    Physical Therapy Evaluation    Patient Name: Gilberto Albarran  MRN: [id number]  Today's Date: 3/30/2024   Time Calculation  Start Time: 0850  Stop Time: 0906  Time Calculation (min): 16 min    Assessment/Plan   PT Assessment  Rehab Prognosis: Good  End of Session Communication: Bedside nurse  Assessment Comment: Pt demonstrates ability to complete mobility tasks with SBA. Pt is safe to return home at this time with low intensity PT. PT will follow during admission to address mobility deficits.  End of Session Patient Position: Up in chair, Alarm off, not on at start of session  IP OR SWING BED PT PLAN  Inpatient or Swing Bed: Inpatient  PT Plan  PT Plan: Skilled PT  PT Frequency: 2 times per week  PT Discharge Recommendations: Low intensity level of continued care  Equipment Recommended upon Discharge:  (rollator walker)  PT Recommended Transfer Status: Stand by assist  PT - OK to Discharge: Yes      Subjective   General Visit Information:  General  Reason for Referral: impaired mobility  Referred By: Evelyn OWEN  Past Medical History Relevant to Rehab: per EMR: He has a past medical history of Chronic obstructive pulmonary disease, unspecified (CMS/[facility]) (01/20/2022), Daily consumption of alcohol, Diplopia (12/04/2020), Esophageal reflux, Essential (primary) hypertension, Essential hypertension, History of rib fracture, Hyperlipidemia, mixed, Insomnia, MDD (major depressive disorder), Morbid obesity (CMS/[facility]), HARRIETT (obstructive sleep apnea), Parkinson disease, Slow transit constipation, Type 2 diabetes mellitus without complications (CMS/[facility]) (01/20/2022), Unspecified exotropia (12/04/2020), Vitamin B12 deficiency, and Vitamin D deficiency.  Co-Treatment: OT  Co-Treatment Reason: safety  Prior to Session Communication: Bedside nurse  Patient Position Received: Up in chair  General Comment: pt agreeable to PT. Wife present.  Home Living:  Home Living  Home Living Comments: Pt lives in a house with his  "wife. 6 BRENT with unilateral rail. Main floor setup for pt. Bathroom has tub shower with grab bars  Prior Level of Function:  Prior Function Per Pt/Caregiver Report  Prior Function Comments: Prior to admission pt was I with ambulation, ADLs, IADLs. Pt is most restricted by endurance and SOB. Pt does have a fall history and reports his right knee \"gives out\" frequently.  Precautions:  Precautions  Medical Precautions: Fall precautions  Vital Signs:       Objective   Pain:     Cognition:  Cognition  Orientation Level: Oriented X4    General Assessments:  Activity Tolerance  Endurance: Tolerates less than 10 min exercise with changes in vital signs (spo2 dropped to 90 during ambulation; recovered to 94)    Sensation  Light Touch: No apparent deficits  Functional Assessments:  Transfers  Transfer:  (sit<>stand SBA)    Ambulation/Gait Training  Ambulation/Gait Training Performed:  (ambulated 30' with SBA; mild SOB)  Extremity/Trunk Assessments:  RLE   RLE :  (grossly 4+/5)  LLE   LLE :  (grossly 4+/5)  Outcome Measures:  [facility] Basic Mobility  Turning from your back to your side while in a flat bed without using bedrails: None  Moving from lying on your back to sitting on the side of a flat bed without using bedrails: None  Moving to and from bed to chair (including a wheelchair): A little  Standing up from a chair using your arms (e.g. wheelchair or bedside chair): A little  To walk in hospital room: A little  Climbing 3-5 steps with railing: A little  Basic Mobility - Total Score: 20    Encounter Problems       Encounter Problems (Active)       PT Problem       Pt will demonstrate ability to complete all transfers independently       Start:  03/30/24    Expected End:  04/13/24            Pt will demonstrate ability to ambulate 500' with Kiah and use of LRAD       Start:  03/30/24    Expected End:  04/13/24            Pt will demonstrate ability to negotiate 6 stairs with Kiah and BL handrail        Start:  03/30/24    " Expected End:  04/13/24            Pt will demonstrate ability to tolerate 20 minutes of an upright activity without SOB.       Start:  03/30/24    Expected End:  04/13/24                   Education Documentation  Precautions, taught by Melody Benjamin, PT at 3/30/2024 10:39 AM.  Learner: Significant Other, Patient  Readiness: Acceptance  Method: Explanation  Response: Needs Reinforcement    Body Mechanics, taught by Melody Benjamin, PT at 3/30/2024 10:39 AM.  Learner: Significant Other, Patient  Readiness: Acceptance  Method: Explanation  Response: Needs Reinforcement    Home Exercise Program, taught by Melody Benjamin, PT at 3/30/2024 10:39 AM.  Learner: Significant Other, Patient  Readiness: Acceptance  Method: Explanation  Response: Needs Reinforcement    Mobility Training, taught by Melody Benjamin, PT at 3/30/2024 10:39 AM.  Learner: Significant Other, Patient  Readiness: Acceptance  Method: Explanation  Response: Needs Reinforcement    Education Comments  No comments found.

## 2024-03-30 NOTE — H&P
History Of Present Illness  Gilberto Albarran is a 63 y.o. male presenting with past medical history of COPD Parkinson disease morbid obesity diabetes admitted to the hospital related to shortness of breath wheezing and hypoxia patient was seen in my office yesterday and he was having significant shortness of breath and wheezing patient has been having upper respiratory infection for the past week patient has a history of daily alcohol consumption.     Past Medical History  He has a past medical history of Chronic obstructive pulmonary disease, unspecified (CMS/[facility]) (01/20/2022), Daily consumption of alcohol, Diplopia (12/04/2020), Esophageal reflux, Essential (primary) hypertension, Essential hypertension, History of rib fracture, Hyperlipidemia, mixed, Insomnia, MDD (major depressive disorder), Morbid obesity (CMS/[facility]), HARRIETT (obstructive sleep apnea), Parkinson disease, Slow transit constipation, Type 2 diabetes mellitus without complications (CMS/[facility]) (01/20/2022), Unspecified exotropia (12/04/2020), Vitamin B12 deficiency, and Vitamin D deficiency.    Surgical History  He has a past surgical history that includes Other surgical history (09/04/2020).     Social History  He reports that he has quit smoking. His smoking use included cigarettes. He smoked an average of 1 pack per day. He does not have any smokeless tobacco history on file. He reports current alcohol use of about 5.0 standard drinks of alcohol per week. He reports that he does not use drugs.    Family History  Family History   Problem Relation Name Age of Onset    Cataracts Mother      Other (HTN, DM) Mother      Parkinsonism Father      Colon cancer Other          Allergies  Patient has no known allergies.    Review of Systems   Constitutional:  Negative for diaphoresis and fatigue.   HENT:  Negative for ear pain, facial swelling, tinnitus and trouble swallowing.    Eyes:  Negative for photophobia and visual disturbance.   Respiratory: Shortness of  "breath and congestion cardiovascular:  Negative for chest pain and palpitations.   Gastrointestinal:  Negative for abdominal pain, blood in stool and diarrhea.   Endocrine: Negative for cold intolerance, heat intolerance, polydipsia and polyuria.   Musculoskeletal:  Negative for back pain and joint swelling.   Skin:  Negative for color change and rash.   Allergic/Immunologic: Negative for food allergies.   Neurological:  Negative for tremors, facial asymmetry and weakness.   Psychiatric/Behavioral:  The patient is not hyperactive.       Physical Exam  HENT:      Right Ear: External ear normal.      Left Ear: External ear normal.      Mouth/Throat:      Mouth: Mucous membranes are moist.   Cardiovascular:      Rate and Rhythm: Normal rate and regular rhythm.      Heart sounds: No murmur heard.     No friction rub. No gallop.   Pulmonary:   Mild wheezing.   Chest:      Chest wall: No tenderness.   Abdominal:      General: There is no distension.      Palpations: There is no mass.      Tenderness: There is no abdominal tenderness. There is no guarding or rebound.   Musculoskeletal:         General: No deformity or signs of injury.      Cervical back: No rigidity or tenderness. Normal range of motion.      Right lower leg: No edema.      Left lower leg: No edema.   Skin:     Coloration: Skin is not jaundiced or pale.      Findings: No lesion.   Neurological:      General: No focal deficit present.      Mental Status: He is alert, oriented to person, place, and time and easily aroused.      Cranial Nerves: No cranial nerve deficit.      Sensory: No sensory deficit.      Motor: No weakness.        Last Recorded Vitals  Blood pressure 145/65, pulse 69, temperature 36.1 °C (97 °F), temperature source Temporal, resp. rate 19, height 1.753 m (5' 9\"), weight 136 kg (300 lb 7.8 oz), SpO2 98 %.    Labs    Admission on 03/29/2024   Component Date Value Ref Range Status    WBC 03/29/2024 8.4  4.4 - 11.3 x10*3/uL Final    nRBC " 03/29/2024 0.0  0.0 - 0.0 /100 WBCs Final    RBC 03/29/2024 3.48 (L)  4.50 - 5.90 x10*6/uL Final    Hemoglobin 03/29/2024 10.7 (L)  13.5 - 17.5 g/dL Final    Hematocrit 03/29/2024 33.2 (L)  41.0 - 52.0 % Final    MCV 03/29/2024 95  80 - 100 fL Final    MCH 03/29/2024 30.7  26.0 - 34.0 pg Final    MCHC 03/29/2024 32.2  32.0 - 36.0 g/dL Final    RDW 03/29/2024 13.5  11.5 - 14.5 % Final    Platelets 03/29/2024 222  150 - 450 x10*3/uL Final    Neutrophils % 03/29/2024 72.8  40.0 - 80.0 % Final    Immature Granulocytes %, Automated 03/29/2024 0.2  0.0 - 0.9 % Final    Immature Granulocyte Count (IG) includes promyelocytes, myelocytes and metamyelocytes but does not include bands. Percent differential counts (%) should be interpreted in the context of the absolute cell counts (cells/UL).    Lymphocytes % 03/29/2024 11.6  13.0 - 44.0 % Final    Monocytes % 03/29/2024 12.9  2.0 - 10.0 % Final    Eosinophils % 03/29/2024 1.9  0.0 - 6.0 % Final    Basophils % 03/29/2024 0.6  0.0 - 2.0 % Final    Neutrophils Absolute 03/29/2024 6.10  1.20 - 7.70 x10*3/uL Final    Percent differential counts (%) should be interpreted in the context of the absolute cell counts (cells/uL).    Immature Granulocytes Absolute, Au* 03/29/2024 0.02  0.00 - 0.70 x10*3/uL Final    Lymphocytes Absolute 03/29/2024 0.97 (L)  1.20 - 4.80 x10*3/uL Final    Monocytes Absolute 03/29/2024 1.08 (H)  0.10 - 1.00 x10*3/uL Final    Eosinophils Absolute 03/29/2024 0.16  0.00 - 0.70 x10*3/uL Final    Basophils Absolute 03/29/2024 0.05  0.00 - 0.10 x10*3/uL Final    Glucose 03/29/2024 70 (L)  74 - 99 mg/dL Final    Sodium 03/29/2024 135 (L)  136 - 145 mmol/L Final    Potassium 03/29/2024 4.2  3.5 - 5.3 mmol/L Final    Chloride 03/29/2024 99  98 - 107 mmol/L Final    Bicarbonate 03/29/2024 28  21 - 32 mmol/L Final    Anion Gap 03/29/2024 12  10 - 20 mmol/L Final    Urea Nitrogen 03/29/2024 21  6 - 23 mg/dL Final    Creatinine 03/29/2024 1.37 (H)  0.50 - 1.30 mg/dL  Final    eGFR 03/29/2024 58 (L)  >60 mL/min/1.73m*2 Final    Calculations of estimated GFR are performed using the 2021 CKD-EPI Study Refit equation without the race variable for the IDMS-Traceable creatinine methods.  https://jasn.asnjournals.org/content/early/2021/09/22/ASN.1369555504    Calcium 03/29/2024 9.3  8.6 - 10.3 mg/dL Final    Albumin 03/29/2024 4.1  3.4 - 5.0 g/dL Final    Alkaline Phosphatase 03/29/2024 93  33 - 136 U/L Final    Total Protein 03/29/2024 6.9  6.4 - 8.2 g/dL Final    AST 03/29/2024 32  9 - 39 U/L Final    Bilirubin, Total 03/29/2024 0.6  0.0 - 1.2 mg/dL Final    ALT 03/29/2024 15  10 - 52 U/L Final    Patients treated with Sulfasalazine may generate falsely decreased results for ALT.    BNP 03/29/2024 374 (H)  0 - 99 pg/mL Final    Protime 03/29/2024 11.5  9.8 - 12.8 seconds Final    INR 03/29/2024 1.0  0.9 - 1.1 Final    Troponin I, High Sensitivity 03/29/2024 19  0 - 20 ng/L Final    Troponin I, High Sensitivity 03/29/2024 18  0 - 20 ng/L Final    Flu A Result 03/29/2024 Not Detected  Not Detected Final    Flu B Result 03/29/2024 Not Detected  Not Detected Final    Coronavirus 2019, PCR 03/29/2024 Not Detected  Not Detected Final    D-Dimer, Quantitative VTE Exclusion 03/29/2024 2,066 (H)  <=500 ng/mL FEU Final    WBC 03/30/2024 5.2  4.4 - 11.3 x10*3/uL Final    nRBC 03/30/2024 0.0  0.0 - 0.0 /100 WBCs Final    RBC 03/30/2024 3.17 (L)  4.50 - 5.90 x10*6/uL Final    Hemoglobin 03/30/2024 9.8 (L)  13.5 - 17.5 g/dL Final    Hematocrit 03/30/2024 30.6 (L)  41.0 - 52.0 % Final    MCV 03/30/2024 97  80 - 100 fL Final    MCH 03/30/2024 30.9  26.0 - 34.0 pg Final    MCHC 03/30/2024 32.0  32.0 - 36.0 g/dL Final    RDW 03/30/2024 13.2  11.5 - 14.5 % Final    Platelets 03/30/2024 207  150 - 450 x10*3/uL Final    Glucose 03/30/2024 321 (H)  74 - 99 mg/dL Final    Sodium 03/30/2024 134 (L)  136 - 145 mmol/L Final    Potassium 03/30/2024 4.2  3.5 - 5.3 mmol/L Final    Chloride 03/30/2024 98  98 -  107 mmol/L Final    Bicarbonate 03/30/2024 29  21 - 32 mmol/L Final    Anion Gap 03/30/2024 11  10 - 20 mmol/L Final    Urea Nitrogen 03/30/2024 27 (H)  6 - 23 mg/dL Final    Creatinine 03/30/2024 1.40 (H)  0.50 - 1.30 mg/dL Final    eGFR 03/30/2024 56 (L)  >60 mL/min/1.73m*2 Final    Calculations of estimated GFR are performed using the 2021 CKD-EPI Study Refit equation without the race variable for the IDMS-Traceable creatinine methods.  https://jasn.asnjournals.org/content/early/2021/09/22/ASN.2041151953    Calcium 03/30/2024 8.6  8.6 - 10.3 mg/dL Final    POCT Glucose 03/29/2024 354 (H)  74 - 99 mg/dL Final    POCT Glucose 03/30/2024 268 (H)  74 - 99 mg/dL Final         Imaging     CT angio chest for pulmonary embolism  Narrative: STUDY:  CT Angiogram of the Chest; 03/29/2024, 1:44 PM.  INDICATION:  Hypoxia ,shortness of breath, elevate D-dimer.  COMPARISON:  CXR: 03/29/2024.  ACCESSION NUMBER(S):  [id number]  ORDERING CLINICIAN:  DARCY CLEVELAND  TECHNIQUE:  CTA of the chest was performed with intravenous contrast.   Images are reviewed and processed at a workstation according to the CT  angiogram protocol with 3-D and/or MIP post processing imaging  generated.  Omnipaque 350 60 mL was administered intravenously.  Automated mA/kV exposure control was utilized and patient examination  was performed in strict accordance with principles of ALARA.  FINDINGS:  Pulmonary arteries are adequately opacified without acute or chronic  filling defects.  The thoracic aorta is normal in course and caliber  without dissection or aneurysm. Atherosclerosis of the coronary  arteries is present.  The heart is mildly enlarged without pericardial  effusion.    Prominent mediastinal lymph nodes are present, likely reactive.   Representative subcarinal lymph node measures 1.8 cm short axis  dimension.  Mild atelectatic changes are present.  Trace bilateral pleural  effusions are present. There is no pneumothorax.  The airways  are  patent. No consolidation, interstitial disease, or suspicious nodules.  Low-attenuation lesion about the anterior aspect of the LEFT hepatic  lobe measures 3.2 cm in size, most likely cysts.   There are no acute fractures.  No suspicious bony lesions.  Senescent  changes of the thoracic spine are present.    Impression: 1. There is no evidence of pulmonary embolus.  2. Mild cardiomegaly. Atherosclerosis of the coronary arteries is  present. Trace bilateral pleural effusions are present.  3. Mediastinal adenopathy, likely reactive.  Signed by Danny Gtz II, MD  XR chest 1 view  Narrative: STUDY:  Chest Radiograph;  3/29/2024 12:34 PM.  INDICATION:  Chest pain.  COMPARISON:  None Available.  ACCESSION NUMBER(S):  [id number]  ORDERING CLINICIAN:  DARCY CLEVELAND  TECHNIQUE:  Frontal chest was obtained at 12:34 hours.  FINDINGS:  CARDIOMEDIASTINAL SILHOUETTE:  Cardiomediastinal silhouette is normal in size and configuration.     LUNGS:  Lungs are clear.     ABDOMEN:  No remarkable upper abdominal findings.     BONES:  No acute osseous changes.  Postoperative changes of the right  shoulder.  Impression: No acute cardiopulmonary abnormality.  Signed by Sae Garcia MD       Patient Active Problem List   Diagnosis    Asthma    HARRIETT treated with BiPAP    COPD exacerbation (CMS/HCC)    Diabetes mellitus (CMS/HCC)    Diplopia    Exotropia    HTN (hypertension)    Hyperlipidemia    Myopia    Ocular inflammation    Abnormal gait    Class 3 severe obesity due to excess calories with body mass index (BMI) of 40.0 to 44.9 in adult (CMS/HCC)    Parkinson disease    Imbalance    At risk for falls    Deviated septum    Hypertrophy of nasal turbinates    Difficulty breathing    Nasal valve collapse    Chest pain    Fall    Type 2 diabetes mellitus (CMS/HCC)    Diastolic dysfunction    Daily consumption of alcohol    Acute hypoxic respiratory failure (CMS/HCC)         Assessment/Plan   Principal Problem:    Acute hypoxic  respiratory failure (CMS/[facility])  Active Problems:    Asthma    HARRIETT treated with BiPAP    COPD exacerbation (CMS/HCC)    Parkinson disease    Fall    Type 2 diabetes mellitus (CMS/[facility])    Diastolic dysfunction    Daily consumption of alcohol      Related to acute COPD exacerbation with bronchitis start patient on steroid breathing treatment and antibiotics  Possible fluid overload with elevation of his BNP and swelling over lower extremity patient will need echocardiogram as an outpatient I will adjust his Lasix  History of diabetes continue to monitor keep patient on sliding scale  Daily alcohol consumption monitor for DT       I spent 45 minutes in the professional and overall care of this patient.      NORY Rivas MD

## 2024-03-30 NOTE — CARE PLAN
The patient's goals for the shift include      The clinical goals for the shift include O2 sats to remain greater than 92% on Room Air    Pt's pox has remained greater than 93 % this shift. Pt has mod tremoring which he and wife state is common for him with his Parkinson's.ambulated x2 in halls today with walker. Awaiting echo completion.

## 2024-03-30 NOTE — PROGRESS NOTES
Occupational Therapy    Evaluation    Patient Name: Gilberto Albarran  MRN: [id number]  Today's Date: 3/30/2024  Time Calculation  Start Time: 0849  Stop Time: 0905  Time Calculation (min): 16 min  Eval only     Assessment  IP OT Assessment  Prognosis: Good  Medical Staff Made Aware: Yes  End of Session Patient Position: Up in chair, Alarm off, caregiver present  Patient presents with decline in ADLs, functional transfers, functional mobility and would benefit from OT during acute stay to improve functional independence and safety. Recommend low intensity OT to maximize functional independence and safety.     Plan:  Treatment Interventions: ADL retraining, Functional transfer training, Patient/family training, Equipment evaluation/education, Compensatory technique education  OT Frequency: 3 times per week  OT Discharge Recommendations: Low intensity level of continued care  Equipment Recommended upon Discharge:  (shower chair)  OT - OK to Discharge: Yes from acute care OT services to the next level of care when cleared by medical team      Subjective     Current Problem:  1. COPD exacerbation (CMS/HCC)        2. Hypoxia        3. Diastolic dysfunction  Transthoracic Echo (TTE) Complete    Transthoracic Echo (TTE) Complete          General:  General  Reason for Referral: ADLs  Referred By: NORY Rivas MD  Past Medical History Relevant to Rehab: per EMR: 63-year-old male with past medical history significant for COPD (asthma type not on oxygen), HARRIETT (BiPAP nightly), NIDDM 2, HTN, HLD, obesity, lymphedema, Parkinson's (with hallucinations,).  Patient presents to [facility] ED by PCP referral (Dr. Rivas) for increasing wheezing and shortness of breath over the last week.  Patient also notes that he has had a cough productive of white/yellow sputum.  Patient also endorses associated sore throat.  Patient denies palpitations, lightheadedness, dizziness, chest pain, abdominal pain, nausea/vomiting/diarrhea.  Of note patient had  "a fall yesterday where he was wedged in between bed and wall for approximately 1.5 hours after which \"he was A&O X1-2 for the remainder of the day according to his wife who is a PA\", and she stated that \"I did an NIH on him yesterday multiple times and it was fine each time\".  Patient also endorses increasing swelling in the lower extremities bilaterally up to the level of the mid thigh with the left more so than the right.  Family/Caregiver Present: Yes (spouse)  Co-Treatment: PT  Co-Treatment Reason: for safety  Prior to Session Communication: Bedside nurse  Patient Position Received: Up in chair, Alarm off, not on at start of session  Preferred Learning Style: verbal  General Comment: Patient  seated in bedside chair with spouse at bedside and agreeable to participate in OT evaluation    Precautions:  Medical Precautions: Fall precautions    Vital Signs:  SpO2:  (decreased to 90% on room air after functional mobility task but improved to 94% after approximately 2 minute seated rest break)    Pain:  Pain Assessment  Pain Assessment: 0-10  Pain Score: 0 - No pain    Objective   Cognition:  Overall Cognitive Status: Within Functional Limits  Orientation Level:  (Oriented to name and place but did not know month, patient knew year)    Home Living:  Home Living Comments: Lives in 2 story home with spouse with 6 BRENT with railing and grab bar at top step by the house.  Reports staying on first floor.  Has tub shower with no DME     Prior Function:  Prior Function Comments: Was independent without AD functional mobility tasks, ADLs and cooking. Spouse manages laundry and cleaning.  Reports 6-7 falls in the past 6 months where right knee gives out.  Plans to purchase rollator. Active with outpatient PT    ADL:  LE Dressing Assistance: Minimal (anticipated)    Activity Tolerance:  Endurance: Endurance does not limit participation in activity    Bed Mobility/Transfers:      Transfers  Transfer:  (SBA sit <> stand with min " verbal cues for proper hand placement and technique) Tremors observed at rest and with activity.    Ambulation/Gait Training:  Functional Mobility  Functional Mobility Performed:  (SBA without AD functional mobility tasks.)    Extremities: RUE   RUE : Within Functional Limits and LUE   LUE: Within Functional Limits    Outcome Measures: [facility] Daily Activity  Putting on and taking off regular lower body clothing: A little  Bathing (including washing, rinsing, drying): A little  Putting on and taking off regular upper body clothing: A little  Toileting, which includes using toilet, bedpan or urinal: A little  Taking care of personal grooming such as brushing teeth: A little  Eating Meals: None  Daily Activity - Total Score: 19    EDUCATION:  Education  Individual(s) Educated: Patient  Education Provided: Fall precautons  Patient Response to Education: Patient/Caregiver Verbalized Understanding of Information    Goals:   Encounter Problems       Encounter Problems (Active)       Dressings Lower Extremities       STG - Patient will complete lower body dressing independently with AE and comp strategies PRN (Progressing)       Start:  03/30/24    Expected End:  04/13/24               Functional Balance       STG-Patient will be independent with assistive device dynamic stand task >5 minutes for ADL completion   (Progressing)       Start:  03/30/24    Expected End:  04/13/24               Functional Mobility       STG-Patient will be independent with assistive device functional mobility tasks   (Progressing)       Start:  03/30/24    Expected End:  04/13/24               OT Transfers       STG-Patient will be independent with functional transfers demonstrating good safety   (Progressing)       Start:  03/30/24    Expected End:  04/13/24

## 2024-03-31 VITALS
TEMPERATURE: 96.8 F | DIASTOLIC BLOOD PRESSURE: 72 MMHG | WEIGHT: 299.83 LBS | HEART RATE: 68 BPM | SYSTOLIC BLOOD PRESSURE: 132 MMHG | BODY MASS INDEX: 44.41 KG/M2 | OXYGEN SATURATION: 93 % | HEIGHT: 69 IN | RESPIRATION RATE: 18 BRPM

## 2024-03-31 LAB
ANION GAP SERPL CALC-SCNC: 12 MMOL/L (ref 10–20)
BUN SERPL-MCNC: 30 MG/DL (ref 6–23)
CALCIUM SERPL-MCNC: 9 MG/DL (ref 8.6–10.3)
CHLORIDE SERPL-SCNC: 99 MMOL/L (ref 98–107)
CO2 SERPL-SCNC: 30 MMOL/L (ref 21–32)
CREAT SERPL-MCNC: 1.39 MG/DL (ref 0.5–1.3)
EGFRCR SERPLBLD CKD-EPI 2021: 57 ML/MIN/1.73M*2
ERYTHROCYTE [DISTWIDTH] IN BLOOD BY AUTOMATED COUNT: 13.4 % (ref 11.5–14.5)
GLUCOSE BLD MANUAL STRIP-MCNC: 138 MG/DL (ref 74–99)
GLUCOSE BLD MANUAL STRIP-MCNC: 304 MG/DL (ref 74–99)
GLUCOSE SERPL-MCNC: 135 MG/DL (ref 74–99)
HCT VFR BLD AUTO: 32.3 % (ref 41–52)
HGB BLD-MCNC: 10.1 G/DL (ref 13.5–17.5)
MCH RBC QN AUTO: 30.9 PG (ref 26–34)
MCHC RBC AUTO-ENTMCNC: 31.3 G/DL (ref 32–36)
MCV RBC AUTO: 99 FL (ref 80–100)
NRBC BLD-RTO: 0 /100 WBCS (ref 0–0)
PLATELET # BLD AUTO: 210 X10*3/UL (ref 150–450)
POTASSIUM SERPL-SCNC: 3.8 MMOL/L (ref 3.5–5.3)
RBC # BLD AUTO: 3.27 X10*6/UL (ref 4.5–5.9)
SODIUM SERPL-SCNC: 137 MMOL/L (ref 136–145)
WBC # BLD AUTO: 8.5 X10*3/UL (ref 4.4–11.3)

## 2024-03-31 PROCEDURE — 2500000001 HC RX 250 WO HCPCS SELF ADMINISTERED DRUGS (ALT 637 FOR MEDICARE OP)

## 2024-03-31 PROCEDURE — 82947 ASSAY GLUCOSE BLOOD QUANT: CPT | Mod: 59

## 2024-03-31 PROCEDURE — 2500000002 HC RX 250 W HCPCS SELF ADMINISTERED DRUGS (ALT 637 FOR MEDICARE OP, ALT 636 FOR OP/ED): Performed by: NURSE PRACTITIONER

## 2024-03-31 PROCEDURE — 2500000004 HC RX 250 GENERAL PHARMACY W/ HCPCS (ALT 636 FOR OP/ED): Performed by: NURSE PRACTITIONER

## 2024-03-31 PROCEDURE — 80048 BASIC METABOLIC PNL TOTAL CA: CPT | Performed by: NURSE PRACTITIONER

## 2024-03-31 PROCEDURE — 94660 CPAP INITIATION&MGMT: CPT

## 2024-03-31 PROCEDURE — 2500000001 HC RX 250 WO HCPCS SELF ADMINISTERED DRUGS (ALT 637 FOR MEDICARE OP): Performed by: INTERNAL MEDICINE

## 2024-03-31 PROCEDURE — 2500000004 HC RX 250 GENERAL PHARMACY W/ HCPCS (ALT 636 FOR OP/ED)

## 2024-03-31 PROCEDURE — G0378 HOSPITAL OBSERVATION PER HR: HCPCS

## 2024-03-31 PROCEDURE — 85027 COMPLETE CBC AUTOMATED: CPT | Performed by: NURSE PRACTITIONER

## 2024-03-31 PROCEDURE — 2500000001 HC RX 250 WO HCPCS SELF ADMINISTERED DRUGS (ALT 637 FOR MEDICARE OP): Performed by: NURSE PRACTITIONER

## 2024-03-31 PROCEDURE — 96376 TX/PRO/DX INJ SAME DRUG ADON: CPT

## 2024-03-31 PROCEDURE — 96372 THER/PROPH/DIAG INJ SC/IM: CPT | Performed by: NURSE PRACTITIONER

## 2024-03-31 PROCEDURE — 36415 COLL VENOUS BLD VENIPUNCTURE: CPT | Performed by: NURSE PRACTITIONER

## 2024-03-31 PROCEDURE — 94640 AIRWAY INHALATION TREATMENT: CPT

## 2024-03-31 RX ORDER — METHYLPREDNISOLONE 4 MG/1
TABLET ORAL
Qty: 21 TABLET | Refills: 0 | Status: SHIPPED | OUTPATIENT
Start: 2024-03-31 | End: 2024-04-07

## 2024-03-31 RX ORDER — AZITHROMYCIN 500 MG/1
1000 TABLET, FILM COATED ORAL ONCE
Status: DISCONTINUED | OUTPATIENT
Start: 2024-03-31 | End: 2024-03-31

## 2024-03-31 RX ORDER — DOXYCYCLINE 100 MG/1
100 CAPSULE ORAL 2 TIMES DAILY
Qty: 10 CAPSULE | Refills: 0 | Status: SHIPPED | OUTPATIENT
Start: 2024-03-31 | End: 2024-04-05

## 2024-03-31 RX ORDER — AZITHROMYCIN 250 MG/1
TABLET, FILM COATED ORAL
Qty: 6 TABLET | Refills: 0 | Status: SHIPPED | OUTPATIENT
Start: 2024-03-31 | End: 2024-04-05

## 2024-03-31 RX ORDER — POTASSIUM CHLORIDE 20 MEQ/1
20 TABLET, EXTENDED RELEASE ORAL ONCE
Status: COMPLETED | OUTPATIENT
Start: 2024-03-31 | End: 2024-03-31

## 2024-03-31 RX ADMIN — FOLIC ACID 1 MG: 1 TABLET ORAL at 08:10

## 2024-03-31 RX ADMIN — POTASSIUM CHLORIDE 20 MEQ: 1500 TABLET, EXTENDED RELEASE ORAL at 09:26

## 2024-03-31 RX ADMIN — SITAGLIPTIN 100 MG: 100 TABLET, FILM COATED ORAL at 08:10

## 2024-03-31 RX ADMIN — BUDESONIDE 0.5 MG: 0.5 INHALANT RESPIRATORY (INHALATION) at 08:23

## 2024-03-31 RX ADMIN — PREDNISONE 40 MG: 20 TABLET ORAL at 08:11

## 2024-03-31 RX ADMIN — GABAPENTIN 800 MG: 300 CAPSULE ORAL at 08:10

## 2024-03-31 RX ADMIN — DOXYCYCLINE HYCLATE 100 MG: 100 CAPSULE ORAL at 08:11

## 2024-03-31 RX ADMIN — LOSARTAN POTASSIUM 100 MG: 100 TABLET, FILM COATED ORAL at 08:11

## 2024-03-31 RX ADMIN — ENOXAPARIN SODIUM 40 MG: 40 INJECTION SUBCUTANEOUS at 08:09

## 2024-03-31 RX ADMIN — Medication 1 TABLET: at 08:10

## 2024-03-31 RX ADMIN — ATENOLOL 100 MG: 100 TABLET ORAL at 08:11

## 2024-03-31 RX ADMIN — FAMOTIDINE 40 MG: 20 TABLET, FILM COATED ORAL at 08:11

## 2024-03-31 RX ADMIN — CARBIDOPA AND LEVODOPA 1 TABLET: 25; 100 TABLET ORAL at 08:10

## 2024-03-31 RX ADMIN — AMLODIPINE BESYLATE 10 MG: 10 TABLET ORAL at 08:11

## 2024-03-31 RX ADMIN — IPRATROPIUM BROMIDE AND ALBUTEROL SULFATE 3 ML: 2.5; .5 SOLUTION RESPIRATORY (INHALATION) at 08:23

## 2024-03-31 RX ADMIN — INSULIN LISPRO 6 UNITS: 100 INJECTION, SOLUTION INTRAVENOUS; SUBCUTANEOUS at 12:19

## 2024-03-31 RX ADMIN — THIAMINE HYDROCHLORIDE 100 MG: 100 INJECTION, SOLUTION INTRAMUSCULAR; INTRAVENOUS at 08:09

## 2024-03-31 RX ADMIN — BUPROPION HYDROCHLORIDE 150 MG: 150 TABLET, EXTENDED RELEASE ORAL at 08:10

## 2024-03-31 RX ADMIN — FORMOTEROL FUMARATE 20 MCG: 20 SOLUTION RESPIRATORY (INHALATION) at 08:23

## 2024-03-31 RX ADMIN — FUROSEMIDE 60 MG: 40 TABLET ORAL at 08:11

## 2024-03-31 RX ADMIN — GLIPIZIDE 10 MG: 10 TABLET ORAL at 08:10

## 2024-03-31 RX ADMIN — CITALOPRAM 40 MG: 40 TABLET, FILM COATED ORAL at 08:12

## 2024-03-31 ASSESSMENT — LIFESTYLE VARIABLES
ANXIETY: NO ANXIETY, AT EASE
HEADACHE, FULLNESS IN HEAD: NOT PRESENT
TREMOR: MODERATE, WITH PATIENT'S ARMS EXTENDED
AGITATION: NORMAL ACTIVITY
NAUSEA AND VOMITING: NO NAUSEA AND NO VOMITING
TOTAL SCORE: 4
VISUAL DISTURBANCES: NOT PRESENT
ORIENTATION AND CLOUDING OF SENSORIUM: ORIENTED AND CAN DO SERIAL ADDITIONS
BLOOD PRESSURE: 140/76
AUDITORY DISTURBANCES: NOT PRESENT
PULSE: 71
PAROXYSMAL SWEATS: NO SWEAT VISIBLE

## 2024-03-31 ASSESSMENT — PAIN - FUNCTIONAL ASSESSMENT: PAIN_FUNCTIONAL_ASSESSMENT: 0-10

## 2024-03-31 ASSESSMENT — PAIN SCALES - GENERAL: PAINLEVEL_OUTOF10: 0 - NO PAIN

## 2024-03-31 NOTE — CARE PLAN
Problem: Pain  Goal: My pain/discomfort is manageable  Outcome: Progressing     Problem: Safety  Goal: Patient will be injury free during hospitalization  Outcome: Progressing  Goal: I will remain free of falls  Outcome: Progressing   The patient's goals for the shift include      The clinical goals for the shift include pox to remain >92%    Pox  THIS SHIFT.

## 2024-03-31 NOTE — CARE PLAN
The patient's goals for the shift include      The clinical goals for the shift include pox to remain >92%

## 2024-03-31 NOTE — NURSING NOTE
Home oxygen desaturation study (must be less than 48 hours prior to discharge)    PART ONE: When patient qualifies in part one, document (A) and recommended liter flow. If they are 89% or more, document (A) and go to part two.     A.__94____ % at rest, on room air (patient qualifies if at or below 88%)  Recommended: ___0___ liter nasal cannula to keep oxygen saturation between 90-92% (only enter a value when ambulation is not required)     Part two: When patient does not qualify in part one. You will need to have values for A,B, and C.      A.____94__ % at rest, on room air (patient qualifies if at or below 88%)  B._85_____ % ambulating on room air (must be on room air)   C.__90____ % ambulating on __2____ L/min     Part three: This is ONLY needed for any patient requiring more than 4 liters of oxygen                A. Patient O2 saturation while ambulating on room air   _________ %  B. Patient O2 saturation while ambulating on 4 liters     _________ %                 C. Patient requires   _________  L/min for O2 saturation of _________ %                 This patient qualifies for home oxygen? _______      1. Please forward this study to the ordering physician using the ROUTE option.   2. Use SmartPhrase .[email or web address] in the cover letter section before sending to the physician to sign off.   3. The physician will sign physical order in the chart (until it is offered in Epic).    4. Contact the home oxygen company as soon as you qualify the patient.     Comments: (use SmartPhrase .o2no if patient does not qualify) FYI: only use until electronic flowsheet and order is created

## 2024-03-31 NOTE — DISCHARGE SUMMARY
Discharge Diagnosis  Acute hypoxic respiratory failure (CMS/HCC)    Issues Requiring Follow-Up  Follow-up with me next week    Discharge Meds     Your medication list        START taking these medications        Instructions Last Dose Given Next Dose Due   azithromycin 250 mg tablet  Commonly known as: Zithromax      Take 2 tabs (500 mg) by mouth today, than 1 tab (250 mg) daily for 4 days.       methylPREDNISolone 4 mg tablets  Commonly known as: Medrol Dospak      Take as directed on package.              CHANGE how you take these medications        Instructions Last Dose Given Next Dose Due   ketoconazole 2 % shampoo  Commonly known as: NIZOral  What changed: how much to take      Apply topically every other day.              CONTINUE taking these medications        Instructions Last Dose Given Next Dose Due   Advair Diskus 500-50 mcg/dose diskus inhaler  Generic drug: fluticasone propion-salmeteroL           albuterol 90 mcg/actuation inhaler           amLODIPine 10 mg tablet  Commonly known as: Norvasc           atenolol 100 mg tablet  Commonly known as: Tenormin           atorvastatin 10 mg tablet  Commonly known as: Lipitor           buPROPion  mg 12 hr tablet  Commonly known as: Wellbutrin SR           carbidopa-levodopa  mg ER tablet  Commonly known as: Sinemet CR      Take 1 tablet by mouth once daily at bedtime. Do not crush, chew, or split.       carbidopa-levodopa  mg tablet  Commonly known as: Sinemet      TAKE 1 TABLET BY MOUTH 3 TIMES DAILY       citalopram 40 mg tablet  Commonly known as: CeleXA           ergocalciferol 1.25 MG (29172 UT) capsule  Commonly known as: Vitamin D-2           famotidine 40 mg tablet  Commonly known as: Pepcid           gabapentin 800 mg tablet  Commonly known as: Neurontin           glipiZIDE 10 mg tablet  Commonly known as: Glucotrol           Januvia 100 mg tablet  Generic drug: SITagliptin phosphate           losartan 100 mg tablet  Commonly known  as: Cozaar           melatonin 10 mg tablet           metFORMIN 1,000 mg tablet  Commonly known as: Glucophage           montelukast 10 mg tablet  Commonly known as: Singulair           Ozempic 1 mg/dose (4 mg/3 mL) pen injector  Generic drug: semaglutide           sildenafil 100 mg tablet  Commonly known as: Viagra                  ASK your doctor about these medications        Instructions Last Dose Given Next Dose Due   furosemide 40 mg tablet  Commonly known as: Lasix                     Where to Get Your Medications        These medications were sent to [facility] - [place], [place] - [address]   [address] [address] [facility] [address]      Phone: [phone]   azithromycin 250 mg tablet  methylPREDNISolone 4 mg tablets         Test Results Pending At Discharge  Pending Labs       No current pending labs.            Hospital Course   Patient was admitted to the hospital related to shortness of breath and wheezing patient diagnosed with acute COPD exacerbation patient had elevation of his BNP so his Lasix was increased patient had echocardiogram showed ejection fraction 50 to 60% with no wall motion abnormality patient treated with the steroid and antibiotic with improvement of his symptoms patient will be discharged home follow-up as an outpatient in 1 week encouraged to lose weight encouraged to use his CPAP at home    Pertinent Physical Exam At Time of Discharge  Physical Exam  HENT:      Right Ear: External ear normal.      Left Ear: External ear normal.      Mouth/Throat:      Mouth: Mucous membranes are moist.   Cardiovascular:      Rate and Rhythm: Normal rate and regular rhythm.      Heart sounds: No murmur heard.     No friction rub. No gallop.   Pulmonary:      Effort: No accessory muscle usage or respiratory distress.      Breath sounds: No stridor. No wheezing or rhonchi.   Chest:      Chest wall: No tenderness.   Abdominal:      General: There is no  distension.      Palpations: There is no mass.      Tenderness: There is no abdominal tenderness. There is no guarding or rebound.   Musculoskeletal:         General: No deformity or signs of injury.      Cervical back: No rigidity or tenderness. Normal range of motion.      Right lower leg: No edema.      Left lower leg: No edema.   Skin:     Coloration: Skin is not jaundiced or pale.      Findings: No lesion.   Neurological:      General: No focal deficit present.      Mental Status: He is alert, oriented to person, place, and time and easily aroused.      Cranial Nerves: No cranial nerve deficit.      Sensory: No sensory deficit.      Motor: No weakness.         Outpatient Follow-Up  Future Appointments   Date Time Provider Department [place]   4/2/2024  3:30 PM John Zavala, PT [id number] [place]   4/3/2024 11:00 AM Ann Marie Dewitt MD [id number] [place]   4/5/2024 12:30 PM Elinor Martinez, PT [id number] [place]   4/16/2024  3:45 PM John Zavala, PT [id number] [place]   4/18/2024  3:00 PM Johnalba Zavala, PT [id number] [place]   4/23/2024 12:15 PM Elinor RAYMUNDO Juan, PT [id number] [place]   4/25/2024  3:00 PM Elinor Martinez, PT [id number] [place]   5/22/2024 11:30 AM ALYX Coppola-CNP [id number] Eran Rivas MD

## 2024-03-31 NOTE — NURSING NOTE
1440: discharge instruction given, pt verbalized understanding. IV was removed per this RN. Pt waiting on transport to private vehicle

## 2024-04-01 ENCOUNTER — APPOINTMENT (OUTPATIENT)
Dept: CARDIOLOGY | Facility: HOSPITAL | Age: 64
DRG: 207 | End: 2024-04-01
Payer: COMMERCIAL

## 2024-04-01 ENCOUNTER — APPOINTMENT (OUTPATIENT)
Dept: RADIOLOGY | Facility: HOSPITAL | Age: 64
DRG: 207 | End: 2024-04-01
Payer: COMMERCIAL

## 2024-04-01 ENCOUNTER — HOSPITAL ENCOUNTER (INPATIENT)
Facility: HOSPITAL | Age: 64
LOS: 5 days | DRG: 207 | End: 2024-04-06
Attending: EMERGENCY MEDICINE | Admitting: INTERNAL MEDICINE
Payer: COMMERCIAL

## 2024-04-01 DIAGNOSIS — I46.9 CARDIAC ARREST (MULTI): Primary | ICD-10-CM

## 2024-04-01 LAB
ABO GROUP (TYPE) IN BLOOD: NORMAL
ALBUMIN SERPL BCP-MCNC: 3.2 G/DL (ref 3.4–5)
ALBUMIN SERPL BCP-MCNC: 3.5 G/DL (ref 3.4–5)
ALBUMIN SERPL BCP-MCNC: 3.5 G/DL (ref 3.4–5)
ALP SERPL-CCNC: 80 U/L (ref 33–136)
ALT SERPL W P-5'-P-CCNC: 25 U/L (ref 10–52)
ANION GAP BLDA CALCULATED.4IONS-SCNC: 10 MMO/L (ref 10–25)
ANION GAP BLDA CALCULATED.4IONS-SCNC: 12 MMO/L (ref 10–25)
ANION GAP BLDA CALCULATED.4IONS-SCNC: 8 MMO/L (ref 10–25)
ANION GAP BLDA CALCULATED.4IONS-SCNC: 8 MMO/L (ref 10–25)
ANION GAP SERPL CALC-SCNC: 11 MMOL/L (ref 10–20)
ANION GAP SERPL CALC-SCNC: 13 MMOL/L (ref 10–20)
ANION GAP SERPL CALC-SCNC: 19 MMOL/L (ref 10–20)
ANTIBODY SCREEN: NORMAL
APPARATUS: ABNORMAL
APPEARANCE UR: CLEAR
ARTERIAL PATENCY WRIST A: ABNORMAL
AST SERPL W P-5'-P-CCNC: 31 U/L (ref 9–39)
BASE EXCESS BLDA CALC-SCNC: -2.9 MMOL/L (ref -2–3)
BASE EXCESS BLDA CALC-SCNC: 0.1 MMOL/L (ref -2–3)
BASE EXCESS BLDA CALC-SCNC: 0.6 MMOL/L (ref -2–3)
BASE EXCESS BLDA CALC-SCNC: 1.8 MMOL/L (ref -2–3)
BASOPHILS # BLD AUTO: 0.02 X10*3/UL (ref 0–0.1)
BASOPHILS # BLD AUTO: 0.04 X10*3/UL (ref 0–0.1)
BASOPHILS NFR BLD AUTO: 0.2 %
BASOPHILS NFR BLD AUTO: 0.3 %
BILIRUB SERPL-MCNC: 0.4 MG/DL (ref 0–1.2)
BILIRUB UR STRIP.AUTO-MCNC: NEGATIVE MG/DL
BNP SERPL-MCNC: 764 PG/ML (ref 0–99)
BODY TEMPERATURE: ABNORMAL
BUN SERPL-MCNC: 33 MG/DL (ref 6–23)
BUN SERPL-MCNC: 34 MG/DL (ref 6–23)
BUN SERPL-MCNC: 37 MG/DL (ref 6–23)
CA-I BLDA-SCNC: 1.16 MMOL/L (ref 1.1–1.33)
CA-I BLDA-SCNC: 1.17 MMOL/L (ref 1.1–1.33)
CA-I BLDA-SCNC: 1.25 MMOL/L (ref 1.1–1.33)
CA-I BLDA-SCNC: 1.39 MMOL/L (ref 1.1–1.33)
CALCIUM SERPL-MCNC: 7.2 MG/DL (ref 8.6–10.3)
CALCIUM SERPL-MCNC: 8.1 MG/DL (ref 8.6–10.3)
CALCIUM SERPL-MCNC: 8.9 MG/DL (ref 8.6–10.3)
CARDIAC TROPONIN I PNL SERPL HS: 108 NG/L (ref 0–20)
CARDIAC TROPONIN I PNL SERPL HS: 128 NG/L (ref 0–20)
CARDIAC TROPONIN I PNL SERPL HS: 25 NG/L (ref 0–20)
CHLORIDE BLDA-SCNC: 100 MMOL/L (ref 98–107)
CHLORIDE BLDA-SCNC: 102 MMOL/L (ref 98–107)
CHLORIDE BLDA-SCNC: 102 MMOL/L (ref 98–107)
CHLORIDE BLDA-SCNC: 99 MMOL/L (ref 98–107)
CHLORIDE SERPL-SCNC: 101 MMOL/L (ref 98–107)
CHLORIDE SERPL-SCNC: 104 MMOL/L (ref 98–107)
CHLORIDE SERPL-SCNC: 99 MMOL/L (ref 98–107)
CO2 SERPL-SCNC: 22 MMOL/L (ref 21–32)
CO2 SERPL-SCNC: 24 MMOL/L (ref 21–32)
CO2 SERPL-SCNC: 29 MMOL/L (ref 21–32)
COLOR UR: YELLOW
CREAT SERPL-MCNC: 1.37 MG/DL (ref 0.5–1.3)
CREAT SERPL-MCNC: 1.59 MG/DL (ref 0.5–1.3)
CREAT SERPL-MCNC: 1.6 MG/DL (ref 0.5–1.3)
CRITICAL CALL TIME: 138
CRITICAL CALL TIME: 314
CRITICAL CALL TIME: 622
CRITICAL CALL TIME: 938
CRITICAL CALLED BY: ABNORMAL
CRITICAL CALLED TO: ABNORMAL
CRITICAL READ BACK: ABNORMAL
EGFRCR SERPLBLD CKD-EPI 2021: 48 ML/MIN/1.73M*2
EGFRCR SERPLBLD CKD-EPI 2021: 48 ML/MIN/1.73M*2
EGFRCR SERPLBLD CKD-EPI 2021: 58 ML/MIN/1.73M*2
EOSINOPHIL # BLD AUTO: 0 X10*3/UL (ref 0–0.7)
EOSINOPHIL # BLD AUTO: 0.02 X10*3/UL (ref 0–0.7)
EOSINOPHIL NFR BLD AUTO: 0 %
EOSINOPHIL NFR BLD AUTO: 0.1 %
ERYTHROCYTE [DISTWIDTH] IN BLOOD BY AUTOMATED COUNT: 13.2 % (ref 11.5–14.5)
ERYTHROCYTE [DISTWIDTH] IN BLOOD BY AUTOMATED COUNT: 13.3 % (ref 11.5–14.5)
FLUAV RNA RESP QL NAA+PROBE: NOT DETECTED
FLUBV RNA RESP QL NAA+PROBE: NOT DETECTED
GLUCOSE BLD MANUAL STRIP-MCNC: 141 MG/DL (ref 74–99)
GLUCOSE BLD MANUAL STRIP-MCNC: 171 MG/DL (ref 74–99)
GLUCOSE BLD MANUAL STRIP-MCNC: 203 MG/DL (ref 74–99)
GLUCOSE BLD MANUAL STRIP-MCNC: 259 MG/DL (ref 74–99)
GLUCOSE BLD MANUAL STRIP-MCNC: 259 MG/DL (ref 74–99)
GLUCOSE BLD MANUAL STRIP-MCNC: 275 MG/DL (ref 74–99)
GLUCOSE BLD MANUAL STRIP-MCNC: 310 MG/DL (ref 74–99)
GLUCOSE BLD MANUAL STRIP-MCNC: 344 MG/DL (ref 74–99)
GLUCOSE BLD MANUAL STRIP-MCNC: 385 MG/DL (ref 74–99)
GLUCOSE BLD MANUAL STRIP-MCNC: 391 MG/DL (ref 74–99)
GLUCOSE BLD MANUAL STRIP-MCNC: 413 MG/DL (ref 74–99)
GLUCOSE BLD MANUAL STRIP-MCNC: 437 MG/DL (ref 74–99)
GLUCOSE BLDA-MCNC: 370 MG/DL (ref 74–99)
GLUCOSE BLDA-MCNC: 389 MG/DL (ref 74–99)
GLUCOSE BLDA-MCNC: 468 MG/DL (ref 74–99)
GLUCOSE BLDA-MCNC: 479 MG/DL (ref 74–99)
GLUCOSE SERPL-MCNC: 353 MG/DL (ref 74–99)
GLUCOSE SERPL-MCNC: 374 MG/DL (ref 74–99)
GLUCOSE SERPL-MCNC: 424 MG/DL (ref 74–99)
GLUCOSE UR STRIP.AUTO-MCNC: ABNORMAL MG/DL
HCO3 BLDA-SCNC: 28.2 MMOL/L (ref 22–26)
HCO3 BLDA-SCNC: 28.9 MMOL/L (ref 22–26)
HCO3 BLDA-SCNC: 29.3 MMOL/L (ref 22–26)
HCO3 BLDA-SCNC: 29.3 MMOL/L (ref 22–26)
HCT VFR BLD AUTO: 29.8 % (ref 41–52)
HCT VFR BLD AUTO: 35.1 % (ref 41–52)
HCT VFR BLD EST: 31 % (ref 41–52)
HCT VFR BLD EST: 31 % (ref 41–52)
HCT VFR BLD EST: 32 % (ref 41–52)
HCT VFR BLD EST: 32 % (ref 41–52)
HGB BLD-MCNC: 10.5 G/DL (ref 13.5–17.5)
HGB BLD-MCNC: 9.2 G/DL (ref 13.5–17.5)
HGB BLDA-MCNC: 10.2 G/DL (ref 13.5–17.5)
HGB BLDA-MCNC: 10.4 G/DL (ref 13.5–17.5)
HGB BLDA-MCNC: 10.5 G/DL (ref 13.5–17.5)
HGB BLDA-MCNC: 10.8 G/DL (ref 13.5–17.5)
HOLD SPECIMEN: NORMAL
HYALINE CASTS #/AREA URNS AUTO: ABNORMAL /LPF
IMM GRANULOCYTES # BLD AUTO: 0.06 X10*3/UL (ref 0–0.7)
IMM GRANULOCYTES # BLD AUTO: 0.07 X10*3/UL (ref 0–0.7)
IMM GRANULOCYTES NFR BLD AUTO: 0.5 % (ref 0–0.9)
IMM GRANULOCYTES NFR BLD AUTO: 0.6 % (ref 0–0.9)
INHALED O2 CONCENTRATION: 100 %
INHALED O2 CONCENTRATION: 60 %
INR PPP: 1.1 (ref 0.9–1.1)
KETONES UR STRIP.AUTO-MCNC: NEGATIVE MG/DL
LACTATE BLDA-SCNC: 1.2 MMOL/L (ref 0.4–2)
LACTATE BLDA-SCNC: 1.9 MMOL/L (ref 0.4–2)
LACTATE BLDA-SCNC: 2.5 MMOL/L (ref 0.4–2)
LACTATE BLDA-SCNC: 6.9 MMOL/L (ref 0.4–2)
LACTATE SERPL-SCNC: 1.1 MMOL/L (ref 0.4–2)
LACTATE SERPL-SCNC: 5.8 MMOL/L (ref 0.4–2)
LEGIONELLA AG UR QL: NEGATIVE
LEUKOCYTE ESTERASE UR QL STRIP.AUTO: NEGATIVE
LYMPHOCYTES # BLD AUTO: 0.29 X10*3/UL (ref 1.2–4.8)
LYMPHOCYTES # BLD AUTO: 3.09 X10*3/UL (ref 1.2–4.8)
LYMPHOCYTES NFR BLD AUTO: 2.7 %
LYMPHOCYTES NFR BLD AUTO: 21.7 %
MAGNESIUM SERPL-MCNC: 1.96 MG/DL (ref 1.6–2.4)
MCH RBC QN AUTO: 30.9 PG (ref 26–34)
MCH RBC QN AUTO: 30.9 PG (ref 26–34)
MCHC RBC AUTO-ENTMCNC: 29.9 G/DL (ref 32–36)
MCHC RBC AUTO-ENTMCNC: 30.9 G/DL (ref 32–36)
MCV RBC AUTO: 100 FL (ref 80–100)
MCV RBC AUTO: 103 FL (ref 80–100)
MONOCYTES # BLD AUTO: 0.43 X10*3/UL (ref 0.1–1)
MONOCYTES # BLD AUTO: 1.48 X10*3/UL (ref 0.1–1)
MONOCYTES NFR BLD AUTO: 10.4 %
MONOCYTES NFR BLD AUTO: 4 %
MRSA DNA SPEC QL NAA+PROBE: NOT DETECTED
NEUTROPHILS # BLD AUTO: 9.52 X10*3/UL (ref 1.2–7.7)
NEUTROPHILS # BLD AUTO: 9.96 X10*3/UL (ref 1.2–7.7)
NEUTROPHILS NFR BLD AUTO: 67 %
NEUTROPHILS NFR BLD AUTO: 92.5 %
NITRITE UR QL STRIP.AUTO: NEGATIVE
NRBC BLD-RTO: 0 /100 WBCS (ref 0–0)
NRBC BLD-RTO: 0 /100 WBCS (ref 0–0)
OXYHGB MFR BLDA: 85 % (ref 94–98)
OXYHGB MFR BLDA: 94.7 % (ref 94–98)
OXYHGB MFR BLDA: 95.5 % (ref 94–98)
OXYHGB MFR BLDA: 97.1 % (ref 94–98)
PCO2 BLDA: 106 MM HG (ref 38–42)
PCO2 BLDA: 60 MM HG (ref 38–42)
PCO2 BLDA: 61 MM HG (ref 38–42)
PCO2 BLDA: 69 MM HG (ref 38–42)
PEEP CMH2O: 10 CM H2O
PH BLDA: 7.05 PH (ref 7.38–7.42)
PH BLDA: 7.23 PH (ref 7.38–7.42)
PH BLDA: 7.28 PH (ref 7.38–7.42)
PH BLDA: 7.29 PH (ref 7.38–7.42)
PH UR STRIP.AUTO: 5 [PH]
PHOSPHATE SERPL-MCNC: 3.4 MG/DL (ref 2.5–4.9)
PHOSPHATE SERPL-MCNC: 3.4 MG/DL (ref 2.5–4.9)
PLATELET # BLD AUTO: 195 X10*3/UL (ref 150–450)
PLATELET # BLD AUTO: 242 X10*3/UL (ref 150–450)
PO2 BLDA: 138 MM HG (ref 85–95)
PO2 BLDA: 61 MM HG (ref 85–95)
PO2 BLDA: 85 MM HG (ref 85–95)
PO2 BLDA: 99 MM HG (ref 85–95)
POTASSIUM BLDA-SCNC: 4.1 MMOL/L (ref 3.5–5.3)
POTASSIUM BLDA-SCNC: 4.2 MMOL/L (ref 3.5–5.3)
POTASSIUM BLDA-SCNC: 4.3 MMOL/L (ref 3.5–5.3)
POTASSIUM BLDA-SCNC: 4.3 MMOL/L (ref 3.5–5.3)
POTASSIUM SERPL-SCNC: 3.8 MMOL/L (ref 3.5–5.3)
POTASSIUM SERPL-SCNC: 3.9 MMOL/L (ref 3.5–5.3)
POTASSIUM SERPL-SCNC: 5.3 MMOL/L (ref 3.5–5.3)
PROCALCITONIN SERPL-MCNC: 12.53 NG/ML
PROT SERPL-MCNC: 6.1 G/DL (ref 6.4–8.2)
PROT UR STRIP.AUTO-MCNC: ABNORMAL MG/DL
PROTHROMBIN TIME: 12.6 SECONDS (ref 9.8–12.8)
RBC # BLD AUTO: 2.98 X10*6/UL (ref 4.5–5.9)
RBC # BLD AUTO: 3.4 X10*6/UL (ref 4.5–5.9)
RBC # UR STRIP.AUTO: NEGATIVE /UL
RBC #/AREA URNS AUTO: ABNORMAL /HPF
RH FACTOR (ANTIGEN D): NORMAL
S PNEUM AG UR QL: NEGATIVE
SAO2 % BLDA: 87 % (ref 94–100)
SAO2 % BLDA: 97 % (ref 94–100)
SAO2 % BLDA: 98 % (ref 94–100)
SAO2 % BLDA: 99 % (ref 94–100)
SARS-COV-2 RNA RESP QL NAA+PROBE: NOT DETECTED
SODIUM BLDA-SCNC: 134 MMOL/L (ref 136–145)
SODIUM BLDA-SCNC: 135 MMOL/L (ref 136–145)
SODIUM BLDA-SCNC: 135 MMOL/L (ref 136–145)
SODIUM BLDA-SCNC: 136 MMOL/L (ref 136–145)
SODIUM SERPL-SCNC: 135 MMOL/L (ref 136–145)
SODIUM SERPL-SCNC: 137 MMOL/L (ref 136–145)
SODIUM SERPL-SCNC: 137 MMOL/L (ref 136–145)
SP GR UR STRIP.AUTO: 1.02
TIDAL VOLUME: 450 ML
TIDAL VOLUME: 500 ML
TIDAL VOLUME: 500 ML
UROBILINOGEN UR STRIP.AUTO-MCNC: <2 MG/DL
VENTILATOR MODE: ABNORMAL
VENTILATOR MODE: ABNORMAL
VENTILATOR RATE: 16 BPM
VENTILATOR RATE: 22 BPM
VENTILATOR RATE: 22 BPM
WBC # BLD AUTO: 10.8 X10*3/UL (ref 4.4–11.3)
WBC # BLD AUTO: 14.2 X10*3/UL (ref 4.4–11.3)
WBC #/AREA URNS AUTO: ABNORMAL /HPF

## 2024-04-01 PROCEDURE — 86900 BLOOD TYPING SEROLOGIC ABO: CPT | Performed by: EMERGENCY MEDICINE

## 2024-04-01 PROCEDURE — 82810 BLOOD GASES O2 SAT ONLY: CPT

## 2024-04-01 PROCEDURE — 96365 THER/PROPH/DIAG IV INF INIT: CPT | Mod: 59

## 2024-04-01 PROCEDURE — 2500000001 HC RX 250 WO HCPCS SELF ADMINISTERED DRUGS (ALT 637 FOR MEDICARE OP)

## 2024-04-01 PROCEDURE — 2500000005 HC RX 250 GENERAL PHARMACY W/O HCPCS: Performed by: EMERGENCY MEDICINE

## 2024-04-01 PROCEDURE — 80069 RENAL FUNCTION PANEL: CPT | Mod: CCI

## 2024-04-01 PROCEDURE — 84484 ASSAY OF TROPONIN QUANT: CPT | Performed by: EMERGENCY MEDICINE

## 2024-04-01 PROCEDURE — 82805 BLOOD GASES W/O2 SATURATION: CPT

## 2024-04-01 PROCEDURE — C9113 INJ PANTOPRAZOLE SODIUM, VIA: HCPCS

## 2024-04-01 PROCEDURE — 74018 RADEX ABDOMEN 1 VIEW: CPT

## 2024-04-01 PROCEDURE — 84145 PROCALCITONIN (PCT): CPT | Mod: STJLAB

## 2024-04-01 PROCEDURE — 85025 COMPLETE CBC W/AUTO DIFF WBC: CPT | Performed by: EMERGENCY MEDICINE

## 2024-04-01 PROCEDURE — 94640 AIRWAY INHALATION TREATMENT: CPT

## 2024-04-01 PROCEDURE — 81001 URINALYSIS AUTO W/SCOPE: CPT | Performed by: EMERGENCY MEDICINE

## 2024-04-01 PROCEDURE — 83605 ASSAY OF LACTIC ACID: CPT | Performed by: EMERGENCY MEDICINE

## 2024-04-01 PROCEDURE — 51702 INSERT TEMP BLADDER CATH: CPT

## 2024-04-01 PROCEDURE — 2500000004 HC RX 250 GENERAL PHARMACY W/ HCPCS (ALT 636 FOR OP/ED): Performed by: EMERGENCY MEDICINE

## 2024-04-01 PROCEDURE — 93005 ELECTROCARDIOGRAM TRACING: CPT

## 2024-04-01 PROCEDURE — 2500000004 HC RX 250 GENERAL PHARMACY W/ HCPCS (ALT 636 FOR OP/ED)

## 2024-04-01 PROCEDURE — 83605 ASSAY OF LACTIC ACID: CPT

## 2024-04-01 PROCEDURE — 87899 AGENT NOS ASSAY W/OPTIC: CPT | Mod: STJLAB

## 2024-04-01 PROCEDURE — 37799 UNLISTED PX VASCULAR SURGERY: CPT

## 2024-04-01 PROCEDURE — 5A1955Z RESPIRATORY VENTILATION, GREATER THAN 96 CONSECUTIVE HOURS: ICD-10-PCS | Performed by: EMERGENCY MEDICINE

## 2024-04-01 PROCEDURE — 83735 ASSAY OF MAGNESIUM: CPT

## 2024-04-01 PROCEDURE — 87640 STAPH A DNA AMP PROBE: CPT | Performed by: EMERGENCY MEDICINE

## 2024-04-01 PROCEDURE — 2500000002 HC RX 250 W HCPCS SELF ADMINISTERED DRUGS (ALT 637 FOR MEDICARE OP, ALT 636 FOR OP/ED)

## 2024-04-01 PROCEDURE — 5A12012 PERFORMANCE OF CARDIAC OUTPUT, SINGLE, MANUAL: ICD-10-PCS | Performed by: EMERGENCY MEDICINE

## 2024-04-01 PROCEDURE — 84295 ASSAY OF SERUM SODIUM: CPT

## 2024-04-01 PROCEDURE — 99291 CRITICAL CARE FIRST HOUR: CPT | Mod: 25

## 2024-04-01 PROCEDURE — 36600 WITHDRAWAL OF ARTERIAL BLOOD: CPT

## 2024-04-01 PROCEDURE — 82947 ASSAY GLUCOSE BLOOD QUANT: CPT

## 2024-04-01 PROCEDURE — 85610 PROTHROMBIN TIME: CPT | Performed by: EMERGENCY MEDICINE

## 2024-04-01 PROCEDURE — 84132 ASSAY OF SERUM POTASSIUM: CPT

## 2024-04-01 PROCEDURE — 99222 1ST HOSP IP/OBS MODERATE 55: CPT | Performed by: INTERNAL MEDICINE

## 2024-04-01 PROCEDURE — 94799 UNLISTED PULMONARY SVC/PX: CPT

## 2024-04-01 PROCEDURE — 86901 BLOOD TYPING SEROLOGIC RH(D): CPT | Performed by: EMERGENCY MEDICINE

## 2024-04-01 PROCEDURE — 82435 ASSAY OF BLOOD CHLORIDE: CPT

## 2024-04-01 PROCEDURE — 70450 CT HEAD/BRAIN W/O DYE: CPT | Performed by: RADIOLOGY

## 2024-04-01 PROCEDURE — 71045 X-RAY EXAM CHEST 1 VIEW: CPT | Mod: FOREIGN READ | Performed by: RADIOLOGY

## 2024-04-01 PROCEDURE — 2500000005 HC RX 250 GENERAL PHARMACY W/O HCPCS

## 2024-04-01 PROCEDURE — 74018 RADEX ABDOMEN 1 VIEW: CPT | Performed by: RADIOLOGY

## 2024-04-01 PROCEDURE — 87636 SARSCOV2 & INF A&B AMP PRB: CPT

## 2024-04-01 PROCEDURE — 80069 RENAL FUNCTION PANEL: CPT | Mod: CCI | Performed by: EMERGENCY MEDICINE

## 2024-04-01 PROCEDURE — 92950 HEART/LUNG RESUSCITATION CPR: CPT

## 2024-04-01 PROCEDURE — 99231 SBSQ HOSP IP/OBS SF/LOW 25: CPT | Performed by: INTERNAL MEDICINE

## 2024-04-01 PROCEDURE — 87075 CULTR BACTERIA EXCEPT BLOOD: CPT | Mod: STJLAB

## 2024-04-01 PROCEDURE — 36415 COLL VENOUS BLD VENIPUNCTURE: CPT | Performed by: EMERGENCY MEDICINE

## 2024-04-01 PROCEDURE — 99291 CRITICAL CARE FIRST HOUR: CPT | Performed by: INTERNAL MEDICINE

## 2024-04-01 PROCEDURE — 96375 TX/PRO/DX INJ NEW DRUG ADDON: CPT | Mod: 59

## 2024-04-01 PROCEDURE — 2500000002 HC RX 250 W HCPCS SELF ADMINISTERED DRUGS (ALT 637 FOR MEDICARE OP, ALT 636 FOR OP/ED): Performed by: EMERGENCY MEDICINE

## 2024-04-01 PROCEDURE — 87040 BLOOD CULTURE FOR BACTERIA: CPT | Mod: STJLAB | Performed by: EMERGENCY MEDICINE

## 2024-04-01 PROCEDURE — 85025 COMPLETE CBC W/AUTO DIFF WBC: CPT

## 2024-04-01 PROCEDURE — 36620 INSERTION CATHETER ARTERY: CPT

## 2024-04-01 PROCEDURE — 83036 HEMOGLOBIN GLYCOSYLATED A1C: CPT | Mod: STJLAB

## 2024-04-01 PROCEDURE — 94002 VENT MGMT INPAT INIT DAY: CPT

## 2024-04-01 PROCEDURE — 87449 NOS EACH ORGANISM AG IA: CPT | Mod: STJLAB

## 2024-04-01 PROCEDURE — 71045 X-RAY EXAM CHEST 1 VIEW: CPT

## 2024-04-01 PROCEDURE — 84484 ASSAY OF TROPONIN QUANT: CPT

## 2024-04-01 PROCEDURE — 2020000001 HC ICU ROOM DAILY

## 2024-04-01 PROCEDURE — 70450 CT HEAD/BRAIN W/O DYE: CPT

## 2024-04-01 PROCEDURE — 99223 1ST HOSP IP/OBS HIGH 75: CPT

## 2024-04-01 PROCEDURE — 93010 ELECTROCARDIOGRAM REPORT: CPT | Performed by: INTERNAL MEDICINE

## 2024-04-01 PROCEDURE — 84132 ASSAY OF SERUM POTASSIUM: CPT | Performed by: EMERGENCY MEDICINE

## 2024-04-01 PROCEDURE — 83880 ASSAY OF NATRIURETIC PEPTIDE: CPT | Performed by: EMERGENCY MEDICINE

## 2024-04-01 PROCEDURE — 96374 THER/PROPH/DIAG INJ IV PUSH: CPT | Mod: 59

## 2024-04-01 RX ORDER — POTASSIUM CHLORIDE 20 MEQ/1
40 TABLET, EXTENDED RELEASE ORAL EVERY 6 HOURS PRN
Status: DISCONTINUED | OUTPATIENT
Start: 2024-04-01 | End: 2024-04-06 | Stop reason: HOSPADM

## 2024-04-01 RX ORDER — POTASSIUM CHLORIDE 1.5 G/1.58G
40 POWDER, FOR SOLUTION ORAL EVERY 6 HOURS PRN
Status: DISCONTINUED | OUTPATIENT
Start: 2024-04-01 | End: 2024-04-06 | Stop reason: HOSPADM

## 2024-04-01 RX ORDER — MONTELUKAST SODIUM 10 MG/1
10 TABLET ORAL NIGHTLY
Status: DISCONTINUED | OUTPATIENT
Start: 2024-04-01 | End: 2024-04-06 | Stop reason: HOSPADM

## 2024-04-01 RX ORDER — LORAZEPAM 2 MG/ML
INJECTION INTRAMUSCULAR
Status: COMPLETED
Start: 2024-04-01 | End: 2024-04-01

## 2024-04-01 RX ORDER — CALCIUM CHLORIDE INJECTION 100 MG/ML
INJECTION, SOLUTION INTRAVENOUS CODE/TRAUMA/SEDATION MEDICATION
Status: COMPLETED | OUTPATIENT
Start: 2024-04-01 | End: 2024-04-01

## 2024-04-01 RX ORDER — ESOMEPRAZOLE MAGNESIUM 40 MG/1
40 GRANULE, DELAYED RELEASE ORAL
Status: DISCONTINUED | OUTPATIENT
Start: 2024-04-01 | End: 2024-04-04

## 2024-04-01 RX ORDER — MONTELUKAST SODIUM 10 MG/1
10 TABLET ORAL NIGHTLY
Status: DISCONTINUED | OUTPATIENT
Start: 2024-04-01 | End: 2024-04-01

## 2024-04-01 RX ORDER — DEXTROSE 50 % IN WATER (D50W) INTRAVENOUS SYRINGE
12.5
Status: DISCONTINUED | OUTPATIENT
Start: 2024-04-01 | End: 2024-04-01

## 2024-04-01 RX ORDER — PANTOPRAZOLE SODIUM 40 MG/1
40 TABLET, DELAYED RELEASE ORAL
Status: DISCONTINUED | OUTPATIENT
Start: 2024-04-01 | End: 2024-04-04

## 2024-04-01 RX ORDER — DEXTROSE 50 % IN WATER (D50W) INTRAVENOUS SYRINGE
12.5
Status: DISCONTINUED | OUTPATIENT
Start: 2024-04-01 | End: 2024-04-06 | Stop reason: HOSPADM

## 2024-04-01 RX ORDER — PROPOFOL 10 MG/ML
5-50 INJECTION, EMULSION INTRAVENOUS CONTINUOUS
Status: DISCONTINUED | OUTPATIENT
Start: 2024-04-01 | End: 2024-04-02

## 2024-04-01 RX ORDER — ALBUTEROL SULFATE 0.83 MG/ML
15 SOLUTION RESPIRATORY (INHALATION) ONCE
Status: COMPLETED | OUTPATIENT
Start: 2024-04-01 | End: 2024-04-01

## 2024-04-01 RX ORDER — INSULIN LISPRO 100 [IU]/ML
0-15 INJECTION, SOLUTION INTRAVENOUS; SUBCUTANEOUS EVERY 4 HOURS
Status: DISCONTINUED | OUTPATIENT
Start: 2024-04-01 | End: 2024-04-01

## 2024-04-01 RX ORDER — PANTOPRAZOLE SODIUM 40 MG/10ML
40 INJECTION, POWDER, LYOPHILIZED, FOR SOLUTION INTRAVENOUS
Status: DISCONTINUED | OUTPATIENT
Start: 2024-04-01 | End: 2024-04-06 | Stop reason: HOSPADM

## 2024-04-01 RX ORDER — VANCOMYCIN 2 GRAM/500 ML IN 0.9 % SODIUM CHLORIDE INTRAVENOUS
2 ONCE
Status: COMPLETED | OUTPATIENT
Start: 2024-04-01 | End: 2024-04-01

## 2024-04-01 RX ORDER — MAGNESIUM SULFATE HEPTAHYDRATE 40 MG/ML
2 INJECTION, SOLUTION INTRAVENOUS EVERY 6 HOURS PRN
Status: DISCONTINUED | OUTPATIENT
Start: 2024-04-01 | End: 2024-04-06 | Stop reason: HOSPADM

## 2024-04-01 RX ORDER — MAGNESIUM SULFATE HEPTAHYDRATE 40 MG/ML
4 INJECTION, SOLUTION INTRAVENOUS EVERY 6 HOURS PRN
Status: DISCONTINUED | OUTPATIENT
Start: 2024-04-01 | End: 2024-04-06 | Stop reason: HOSPADM

## 2024-04-01 RX ORDER — POLYETHYLENE GLYCOL 3350 17 G/17G
17 POWDER, FOR SOLUTION ORAL DAILY
Status: DISCONTINUED | OUTPATIENT
Start: 2024-04-01 | End: 2024-04-01

## 2024-04-01 RX ORDER — LORAZEPAM 2 MG/ML
4 INJECTION INTRAMUSCULAR ONCE
Status: COMPLETED | OUTPATIENT
Start: 2024-04-01 | End: 2024-04-01

## 2024-04-01 RX ORDER — BUDESONIDE 0.5 MG/2ML
0.5 INHALANT ORAL
Status: DISCONTINUED | OUTPATIENT
Start: 2024-04-01 | End: 2024-04-06 | Stop reason: HOSPADM

## 2024-04-01 RX ORDER — DEXTROSE 50 % IN WATER (D50W) INTRAVENOUS SYRINGE
25
Status: DISCONTINUED | OUTPATIENT
Start: 2024-04-01 | End: 2024-04-01

## 2024-04-01 RX ORDER — INDOMETHACIN 25 MG/1
CAPSULE ORAL CODE/TRAUMA/SEDATION MEDICATION
Status: COMPLETED | OUTPATIENT
Start: 2024-04-01 | End: 2024-04-01

## 2024-04-01 RX ORDER — FENTANYL CITRATE 50 UG/ML
25 INJECTION, SOLUTION INTRAMUSCULAR; INTRAVENOUS EVERY 10 MIN PRN
Status: DISCONTINUED | OUTPATIENT
Start: 2024-04-01 | End: 2024-04-02

## 2024-04-01 RX ORDER — HEPARIN SODIUM 5000 [USP'U]/ML
7500 INJECTION, SOLUTION INTRAVENOUS; SUBCUTANEOUS EVERY 8 HOURS SCHEDULED
Status: DISCONTINUED | OUTPATIENT
Start: 2024-04-01 | End: 2024-04-06 | Stop reason: HOSPADM

## 2024-04-01 RX ORDER — MAGNESIUM SULFATE HEPTAHYDRATE 40 MG/ML
2 INJECTION, SOLUTION INTRAVENOUS ONCE
Status: COMPLETED | OUTPATIENT
Start: 2024-04-01 | End: 2024-04-01

## 2024-04-01 RX ORDER — FENTANYL CITRATE-0.9 % NACL/PF 10 MCG/ML
0-300 PLASTIC BAG, INJECTION (ML) INTRAVENOUS CONTINUOUS
Status: DISCONTINUED | OUTPATIENT
Start: 2024-04-01 | End: 2024-04-02

## 2024-04-01 RX ORDER — LIDOCAINE HYDROCHLORIDE 10 MG/ML
5 INJECTION, SOLUTION EPIDURAL; INFILTRATION; INTRACAUDAL; PERINEURAL ONCE
Status: COMPLETED | OUTPATIENT
Start: 2024-04-01 | End: 2024-04-01

## 2024-04-01 RX ORDER — PROPOFOL 10 MG/ML
5-50 INJECTION, EMULSION INTRAVENOUS CONTINUOUS
Status: DISCONTINUED | OUTPATIENT
Start: 2024-04-01 | End: 2024-04-01

## 2024-04-01 RX ORDER — FENTANYL CITRATE-0.9 % NACL/PF 10 MCG/ML
0-300 PLASTIC BAG, INJECTION (ML) INTRAVENOUS CONTINUOUS
Status: DISCONTINUED | OUTPATIENT
Start: 2024-04-01 | End: 2024-04-01

## 2024-04-01 RX ORDER — SODIUM BICARBONATE 1 MEQ/ML
SYRINGE (ML) INTRAVENOUS
Status: DISPENSED
Start: 2024-04-01 | End: 2024-04-01

## 2024-04-01 RX ORDER — FENTANYL CITRATE 50 UG/ML
25 INJECTION, SOLUTION INTRAMUSCULAR; INTRAVENOUS ONCE
Status: DISCONTINUED | OUTPATIENT
Start: 2024-04-01 | End: 2024-04-01

## 2024-04-01 RX ORDER — INSULIN LISPRO 100 [IU]/ML
0-15 INJECTION, SOLUTION INTRAVENOUS; SUBCUTANEOUS EVERY 4 HOURS
Status: DISCONTINUED | OUTPATIENT
Start: 2024-04-02 | End: 2024-04-06 | Stop reason: HOSPADM

## 2024-04-01 RX ORDER — IPRATROPIUM BROMIDE AND ALBUTEROL SULFATE 2.5; .5 MG/3ML; MG/3ML
9 SOLUTION RESPIRATORY (INHALATION) ONCE
Status: COMPLETED | OUTPATIENT
Start: 2024-04-01 | End: 2024-04-01

## 2024-04-01 RX ORDER — DEXTROSE 50 % IN WATER (D50W) INTRAVENOUS SYRINGE
25
Status: DISCONTINUED | OUTPATIENT
Start: 2024-04-01 | End: 2024-04-06 | Stop reason: HOSPADM

## 2024-04-01 RX ORDER — IPRATROPIUM BROMIDE AND ALBUTEROL SULFATE 2.5; .5 MG/3ML; MG/3ML
3 SOLUTION RESPIRATORY (INHALATION) EVERY 6 HOURS PRN
Status: DISCONTINUED | OUTPATIENT
Start: 2024-04-01 | End: 2024-04-06 | Stop reason: HOSPADM

## 2024-04-01 RX ORDER — POTASSIUM CHLORIDE 14.9 MG/ML
20 INJECTION INTRAVENOUS EVERY 6 HOURS PRN
Status: DISCONTINUED | OUTPATIENT
Start: 2024-04-01 | End: 2024-04-06 | Stop reason: HOSPADM

## 2024-04-01 RX ADMIN — CALCIUM CHLORIDE 1 G: 100 INJECTION INTRAVENOUS; INTRAVENTRICULAR at 01:31

## 2024-04-01 RX ADMIN — LORAZEPAM 4 MG: 2 INJECTION INTRAMUSCULAR at 03:05

## 2024-04-01 RX ADMIN — Medication 2 G: at 03:24

## 2024-04-01 RX ADMIN — IPRATROPIUM BROMIDE AND ALBUTEROL SULFATE 9 ML: 2.5; .5 SOLUTION RESPIRATORY (INHALATION) at 01:57

## 2024-04-01 RX ADMIN — MONTELUKAST 10 MG: 10 TABLET, FILM COATED ORAL at 20:09

## 2024-04-01 RX ADMIN — LIDOCAINE HYDROCHLORIDE 50 MG: 10 INJECTION, SOLUTION EPIDURAL; INFILTRATION; INTRACAUDAL; PERINEURAL at 05:45

## 2024-04-01 RX ADMIN — HEPARIN SODIUM 7500 UNITS: 5000 INJECTION INTRAVENOUS; SUBCUTANEOUS at 21:40

## 2024-04-01 RX ADMIN — PROPOFOL 50 MCG/KG/MIN: 10 INJECTION, EMULSION INTRAVENOUS at 23:18

## 2024-04-01 RX ADMIN — SODIUM CHLORIDE 2121 ML: 9 INJECTION, SOLUTION INTRAVENOUS at 03:24

## 2024-04-01 RX ADMIN — PROPOFOL 50 MCG/KG/MIN: 10 INJECTION, EMULSION INTRAVENOUS at 20:50

## 2024-04-01 RX ADMIN — AZITHROMYCIN MONOHYDRATE 500 MG: 500 INJECTION, POWDER, LYOPHILIZED, FOR SOLUTION INTRAVENOUS at 02:35

## 2024-04-01 RX ADMIN — PIPERACILLIN SODIUM AND TAZOBACTAM SODIUM 4.5 G: 4; .5 INJECTION, SOLUTION INTRAVENOUS at 21:40

## 2024-04-01 RX ADMIN — SODIUM BICARBONATE 50 MEQ: 84 INJECTION, SOLUTION INTRAVENOUS at 01:32

## 2024-04-01 RX ADMIN — INSULIN LISPRO 15 UNITS: 100 INJECTION, SOLUTION INTRAVENOUS; SUBCUTANEOUS at 06:44

## 2024-04-01 RX ADMIN — PROPOFOL 50 MCG/KG/MIN: 10 INJECTION, EMULSION INTRAVENOUS at 11:00

## 2024-04-01 RX ADMIN — BUDESONIDE 0.5 MG: 0.5 INHALANT RESPIRATORY (INHALATION) at 22:34

## 2024-04-01 RX ADMIN — PROPOFOL 50 MCG/KG/MIN: 10 INJECTION, EMULSION INTRAVENOUS at 08:46

## 2024-04-01 RX ADMIN — SODIUM CHLORIDE, POTASSIUM CHLORIDE, SODIUM LACTATE AND CALCIUM CHLORIDE 1000 ML: 600; 310; 30; 20 INJECTION, SOLUTION INTRAVENOUS at 13:03

## 2024-04-01 RX ADMIN — MAGNESIUM SULFATE HEPTAHYDRATE 2 G: 40 INJECTION, SOLUTION INTRAVENOUS at 01:51

## 2024-04-01 RX ADMIN — Medication 150 MCG/HR: at 16:48

## 2024-04-01 RX ADMIN — LORAZEPAM 4 MG: 2 INJECTION, SOLUTION INTRAMUSCULAR; INTRAVENOUS at 03:05

## 2024-04-01 RX ADMIN — PIPERACILLIN SODIUM AND TAZOBACTAM SODIUM 4.5 G: 4; .5 INJECTION, SOLUTION INTRAVENOUS at 14:18

## 2024-04-01 RX ADMIN — PROPOFOL 50 MCG/KG/MIN: 10 INJECTION, EMULSION INTRAVENOUS at 18:41

## 2024-04-01 RX ADMIN — PROPOFOL 50 MCG/KG/MIN: 10 INJECTION, EMULSION INTRAVENOUS at 16:13

## 2024-04-01 RX ADMIN — PANTOPRAZOLE SODIUM 40 MG: 40 INJECTION, POWDER, FOR SOLUTION INTRAVENOUS at 06:43

## 2024-04-01 RX ADMIN — PROPOFOL 5 MCG/KG/MIN: 10 INJECTION, EMULSION INTRAVENOUS at 01:52

## 2024-04-01 RX ADMIN — SODIUM BICARBONATE 50 MEQ: 84 INJECTION, SOLUTION INTRAVENOUS at 01:34

## 2024-04-01 RX ADMIN — PIPERACILLIN SODIUM AND TAZOBACTAM SODIUM 4.5 G: 4; .5 INJECTION, SOLUTION INTRAVENOUS at 02:05

## 2024-04-01 RX ADMIN — PROPOFOL 50 MCG/KG/MIN: 10 INJECTION, EMULSION INTRAVENOUS at 06:57

## 2024-04-01 RX ADMIN — MAGNESIUM SULFATE HEPTAHYDRATE 2 G: 40 INJECTION, SOLUTION INTRAVENOUS at 15:02

## 2024-04-01 RX ADMIN — PROPOFOL 50 MCG/KG/MIN: 10 INJECTION, EMULSION INTRAVENOUS at 13:37

## 2024-04-01 RX ADMIN — HEPARIN SODIUM 7500 UNITS: 5000 INJECTION INTRAVENOUS; SUBCUTANEOUS at 13:03

## 2024-04-01 RX ADMIN — ALBUTEROL SULFATE 15 MG: 2.5 SOLUTION RESPIRATORY (INHALATION) at 01:59

## 2024-04-01 RX ADMIN — Medication 25 MCG/HR: at 01:52

## 2024-04-01 RX ADMIN — METHYLPREDNISOLONE SODIUM SUCCINATE 125 MG: 125 INJECTION, POWDER, FOR SOLUTION INTRAMUSCULAR; INTRAVENOUS at 01:53

## 2024-04-01 RX ADMIN — SODIUM BICARBONATE 50 MEQ: 84 INJECTION, SOLUTION INTRAVENOUS at 01:36

## 2024-04-01 RX ADMIN — HEPARIN SODIUM 7500 UNITS: 5000 INJECTION INTRAVENOUS; SUBCUTANEOUS at 06:44

## 2024-04-01 RX ADMIN — POTASSIUM CHLORIDE 20 MEQ: 14.9 INJECTION, SOLUTION INTRAVENOUS at 15:02

## 2024-04-01 RX ADMIN — METHYLPREDNISOLONE SODIUM SUCCINATE 30 MG: 40 INJECTION, POWDER, FOR SOLUTION INTRAMUSCULAR; INTRAVENOUS at 13:03

## 2024-04-01 RX ADMIN — Medication 150 MCG/HR: at 23:30

## 2024-04-01 RX ADMIN — METHYLPREDNISOLONE SODIUM SUCCINATE 30 MG: 40 INJECTION, POWDER, FOR SOLUTION INTRAMUSCULAR; INTRAVENOUS at 20:09

## 2024-04-01 RX ADMIN — Medication 150 MCG/HR: at 10:49

## 2024-04-01 RX ADMIN — Medication 60 PERCENT: at 20:00

## 2024-04-01 RX ADMIN — INSULIN HUMAN 4 UNITS/HR: 1 INJECTION, SOLUTION INTRAVENOUS at 10:53

## 2024-04-01 ASSESSMENT — PAIN - FUNCTIONAL ASSESSMENT
PAIN_FUNCTIONAL_ASSESSMENT: CPOT (CRITICAL CARE PAIN OBSERVATION TOOL)
PAIN_FUNCTIONAL_ASSESSMENT: CPOT (CRITICAL CARE PAIN OBSERVATION TOOL)

## 2024-04-01 ASSESSMENT — COGNITIVE AND FUNCTIONAL STATUS - GENERAL
HELP NEEDED FOR BATHING: TOTAL
STANDING UP FROM CHAIR USING ARMS: TOTAL
TOILETING: TOTAL
TURNING FROM BACK TO SIDE WHILE IN FLAT BAD: TOTAL
WALKING IN HOSPITAL ROOM: TOTAL
DRESSING REGULAR UPPER BODY CLOTHING: TOTAL
MOBILITY SCORE: 6
DRESSING REGULAR LOWER BODY CLOTHING: TOTAL
TURNING FROM BACK TO SIDE WHILE IN FLAT BAD: TOTAL
CLIMB 3 TO 5 STEPS WITH RAILING: TOTAL
MOBILITY SCORE: 6
PERSONAL GROOMING: TOTAL
EATING MEALS: TOTAL
HELP NEEDED FOR BATHING: TOTAL
MOVING FROM LYING ON BACK TO SITTING ON SIDE OF FLAT BED WITH BEDRAILS: TOTAL
DRESSING REGULAR LOWER BODY CLOTHING: TOTAL
MOVING FROM LYING ON BACK TO SITTING ON SIDE OF FLAT BED WITH BEDRAILS: TOTAL
MOVING TO AND FROM BED TO CHAIR: TOTAL
TOILETING: TOTAL
DAILY ACTIVITIY SCORE: 6
DRESSING REGULAR UPPER BODY CLOTHING: TOTAL
MOVING TO AND FROM BED TO CHAIR: TOTAL
STANDING UP FROM CHAIR USING ARMS: TOTAL
EATING MEALS: TOTAL
DAILY ACTIVITIY SCORE: 6
PERSONAL GROOMING: TOTAL
CLIMB 3 TO 5 STEPS WITH RAILING: TOTAL
WALKING IN HOSPITAL ROOM: TOTAL

## 2024-04-01 ASSESSMENT — PAIN SCALES - GENERAL: PAINLEVEL_OUTOF10: 0 - NO PAIN

## 2024-04-01 NOTE — H&P
Critical Care Medicine History and Physical        Subjective   Patient is a 63 y.o. male admitted on 4/1/2024  1:28 AM  with chief complaint of s/p cardiac arrest witnessed my EMS with immediate initiation of CPR.     HPI:  63-year-old male with past medical history significant for COPD not on home O2, HARRIETT on BiPAP nightly, Parkinson's disease complicated by hallucinations, send hypertension, hyperlipidemia and class III obesity, IDDM type II presenting to [facility] status post witnessed cardiac arrest per EMS noting EMS was called for chief complaint of worsening shortness of breath.  Patient has a recent hospitalization from 3/29/2024 discharged on 3/31 in the treatment of COPD exacerbation.  Treatment was with anti-inflammatories metabolic therapy a increase in his home diuretic therapy Lasix 40 mg twice daily.  Per report and review of clinical record patient arrived home on the day of discharge with continued respiratory distress family called EMS.    Review of ambulance record shows that patient was found by EMS sitting upright in a chair outside his home alert and oriented x 4 in severe respiratory distress Harsh expiratory wheezes noted.  Patient on 5 L nasal cannula, noting EMS attempted to give patient a course of DuoNeb but shortly before initiating therapy patient lost consciousness pulseless and apneic and CPR was initiated and a cardiac monitor showed asystole pulses were reassessed every 2 minutes and one of the pulse check showed bradycardic PEA.  Patient intubated and route administered 1 epi via IO and CPR resumed upon arrival to [facility].  All in the ED patient required 2 more rounds of CPR with 2 more doses of epinephrine, 3 A of bicarb were given and 1 of calcium gluconate ROSC was achieved.  Patient was seen and examined in the emergency department noting is intubated at the time of interview family not at bedside HPI and ROS completed to be the use of review of clinical record and ambulance  report.    ED course:  Vitals: Afebrile, , , /83, 96% on mechanical ventilator.  Labs: CMP remarkable for serum glucose of 353, serum sodium 135, potassium 5.3, BUN 34, serum creatinine 1.60, lactate 5.8,  and troponin 25.  INR 1.1, PT 12.6.  CBC remarkable for leukocytosis with WBC 14.2, hemoglobin 10.5 platelets 242.  Initial ABG showed acidemia with pH of 7.05, pCO2 106, P O2 99.  A repeat ABG after being ventilated mechanically revealed improvement of acidemia with pH of 7.23, pCO2 69 and pO2 61.  UA negative for indication of UTI  Imaging: CXR status post intubation appears similar to prior with questionable vascular congestion, head CT without IV contrast completed but official read not back.  Abdominal x-ray shows NG tube over the distal esophagus needs to be advanced  Patient arrived to the ED receiving CPR per EMS transition to the care of ED physician or CPR resumed for 2 cycles ROSC obtained at this point Case discussed with intensive care team admitted to the ICU for targeted temp cooling status postcardiac arrest.    Past Medical History:   Diagnosis Date    Chronic obstructive pulmonary disease, unspecified (CMS/[facility]) 01/20/2022    Chronic obstructive pulmonary disease, unspecified COPD type    Daily consumption of alcohol     Diplopia 12/04/2020    Diplopia    Esophageal reflux     Essential (primary) hypertension     HTN (hypertension)    Essential hypertension     History of rib fracture     Hyperlipidemia, mixed     Insomnia     MDD (major depressive disorder)     Morbid obesity (CMS/[facility])     HARRIETT (obstructive sleep apnea)     Parkinson disease     Slow transit constipation     Type 2 diabetes mellitus without complications (CMS/[facility]) 01/20/2022    Diabetes mellitus    Unspecified exotropia 12/04/2020    Exotropia    Vitamin B12 deficiency     Vitamin D deficiency      Past Surgical History:   Procedure Laterality Date    OTHER SURGICAL HISTORY  09/04/2020    Rotator cuff  repair     Medications Prior to Admission   Medication Sig Dispense Refill Last Dose    albuterol 90 mcg/actuation inhaler Inhale 2 puffs every 4 hours if needed for wheezing or shortness of breath.       amLODIPine (Norvasc) 10 mg tablet Take 1 tablet (10 mg) by mouth once daily.       atenolol (Tenormin) 100 mg tablet Take 1 tablet (100 mg) by mouth once daily.       atorvastatin (Lipitor) 10 mg tablet Take 1 tablet (10 mg) by mouth once daily at bedtime.       azithromycin (Zithromax) 250 mg tablet Take 2 tabs (500 mg) by mouth today, than 1 tab (250 mg) daily for 4 days. 6 tablet 0     buPROPion SR (Wellbutrin SR) 150 mg 12 hr tablet Take 1 tablet (150 mg) by mouth 2 times a day. Do not crush, chew, or split.       carbidopa-levodopa (Sinemet CR)  mg ER tablet Take 1 tablet by mouth once daily at bedtime. Do not crush, chew, or split. 90 tablet 3     carbidopa-levodopa (Sinemet)  mg tablet TAKE 1 TABLET BY MOUTH 3 TIMES DAILY 90 tablet 10     citalopram (CeleXA) 40 mg tablet Take 1 tablet (40 mg) by mouth once daily.       doxycycline (Vibramycin) 100 mg capsule Take 1 capsule (100 mg) by mouth 2 times a day for 5 days. Take with at least 8 ounces (large glass) of water, do not lie down for 30 minutes after 10 capsule 0     ergocalciferol (Vitamin D-2) 1.25 MG (63022 UT) capsule Take 1 capsule (50,000 Units) by mouth 1 (one) time per week. Saturday       famotidine (Pepcid) 40 mg tablet Take 1 tablet (40 mg) by mouth once daily. As directed       fluticasone propion-salmeteroL (Advair Diskus) 500-50 mcg/dose diskus inhaler Inhale 1 puff 2 times a day.       furosemide (Lasix) 40 mg tablet Take 1 tablet (40 mg) by mouth 2 times a day.       gabapentin (Neurontin) 800 mg tablet Take 1 tablet (800 mg) by mouth 3 times a day.       glipiZIDE (Glucotrol) 10 mg tablet Take 1 tablet (10 mg) by mouth 2 times a day.       ketoconazole (NIZOral) 2 % shampoo Apply topically every other day. 120 mL 3      losartan (Cozaar) 100 mg tablet Take 1 tablet (100 mg) by mouth once daily.       melatonin 10 mg tablet Take 1 tablet (10 mg) by mouth once daily at bedtime.       metFORMIN (Glucophage) 1,000 mg tablet Take 1 tablet (1,000 mg) by mouth 2 times a day.       methylPREDNISolone (Medrol Dospak) 4 mg tablets Take as directed on package. 21 tablet 0     montelukast (Singulair) 10 mg tablet Take 1 tablet (10 mg) by mouth once daily at bedtime.       oxygen (O2) gas therapy Inhale 1 each once every 24 hours.  0     semaglutide (Ozempic) 1 mg/dose (4 mg/3 mL) pen injector Inject 1 mg under the skin every 7 days. Wednesday       sildenafil (Viagra) 100 mg tablet Take 1 tablet (100 mg) by mouth if needed for erectile dysfunction.       SITagliptin phosphate (Januvia) 100 mg tablet Take 1 tablet (100 mg) by mouth once daily.        Patient has no known allergies.  Social History     Tobacco Use    Smoking status: Former     Packs/day: 1     Types: Cigarettes   Substance Use Topics    Alcohol use: Yes     Alcohol/week: 5.0 standard drinks of alcohol     Types: 5 Standard drinks or equivalent per week    Drug use: Never     Family History   Problem Relation Name Age of Onset    Cataracts Mother      Other (HTN, DM) Mother      Parkinsonism Father      Colon cancer Other         Scheduled Medications:   pantoprazole, 40 mg, oral, Daily before breakfast   Or  esomeprazole, 40 mg, nasoduodenal tube, Daily before breakfast   Or  pantoprazole, 40 mg, intravenous, Daily before breakfast  fentaNYL, 25 mcg, intravenous, Once  heparin (porcine), 7,500 Units, subcutaneous, q8h YANIQUE  insulin lispro, 0-15 Units, subcutaneous, q4h  lidocaine PF, 5 mL, infiltration, Once  sodium bicarbonate, , ,          Continuous Medications:   fentaNYL, 0-300 mcg/hr, Last Rate: 100 mcg/hr (04/01/24 0320)  propofol, 5-50 mcg/kg/min, Last Rate: 30 mcg/kg/min (04/01/24 0320)         PRN Medications:   PRN medications: dextrose, dextrose, fentaNYL, glucagon,  glucagon, magnesium sulfate, magnesium sulfate, potassium chloride CR **OR** potassium chloride, potassium chloride, sodium bicarbonate    Review of Systems:  Review of Systems   Unable to perform ROS: Intubated       Objective   Vitals:  Most Recent:  Vitals:    04/01/24 0415   BP: 176/72   Pulse: (!) 116   Resp: (!) 22   SpO2: 95%       24hr Min/Max:  Temp  Min: 36 °C (96.8 °F)  Max: 36.1 °C (97 °F)  Pulse  Min: 68  Max: 126  BP  Min: 132/72  Max: 186/83  Resp  Min: 14  Max: 102  SpO2  Min: 92 %  Max: 100 %    LDA:   ETT  7.5 mm (Active)   Placement Date/Time: 04/01/24 0200   Placed by External Staff?: EMS  Single Lumen Tube Size: 7.5 mm  Cuffed: Yes  Location: Oral   Number of days: 0       Urethral Catheter 18 Fr. (Active)   Placement Date/Time: 04/01/24 0143   Placed by: [email or web address]  Tube Size (Fr.): 18 Fr.  Catheter Balloon Size: 10 mL  Urine Returned: Yes   Number of days: 0       NG/OG/Feeding Tube OG - [facility] sump 16 Fr Left mouth (Active)   Placement Date/Time: 04/01/24 0142   Placed by: [email or web address]  Type of Tube: (c) NG/OG Tube  Tube Type: OG - [facility] sump  NG/OG Tube Size: 16 Fr  Tube Location: Left mouth   Number of days: 0         Vent settings:  Vent Mode: Volume control/assist control  FiO2 (%):  [100 %] 100 %  S RR:  [22] 22  S VT:  [500 mL] 500 mL  PEEP/CPAP (cm H2O):  [10 cm H20] 10 cm H20  MAP (cm H2O):  [18] 18    Hemodynamic parameters for last 24 hours:       No intake/output data recorded.      Physical exam:    Physical Exam  Vitals and nursing note reviewed.   Constitutional:       Comments: Chronically ill appearance, obese male, intubated   HENT:      Head: Normocephalic and atraumatic.      Mouth/Throat:      Mouth: Mucous membranes are dry.      Comments: Endotracheal tube in place  Eyes:      General: No scleral icterus.     Conjunctiva/sclera: Conjunctivae normal.   Cardiovascular:      Pulses: Normal pulses.   Pulmonary:      Effort: Respiratory distress present.      Breath sounds: Rhonchi  present.      Comments: Mechanically ventilated  Abdominal:      General: Abdomen is flat.      Palpations: Abdomen is soft.   Musculoskeletal:      Cervical back: Neck supple.      Comments: Venous stasis dermatitis bilateral lower extremities, feeling ulcer to the plantar aspect of the great toe right foot nonpurulent   Skin:     General: Skin is warm and dry.      Capillary Refill: Capillary refill takes 2 to 3 seconds.   Neurological:      Comments: Sedated         Lab/Radiology/Diagnostic Review:  Results for orders placed or performed during the hospital encounter of 04/01/24 (from the past 24 hour(s))   CBC with Differential   Result Value Ref Range    WBC 14.2 (H) 4.4 - 11.3 x10*3/uL    nRBC 0.0 0.0 - 0.0 /100 WBCs    RBC 3.40 (L) 4.50 - 5.90 x10*6/uL    Hemoglobin 10.5 (L) 13.5 - 17.5 g/dL    Hematocrit 35.1 (L) 41.0 - 52.0 %     (H) 80 - 100 fL    MCH 30.9 26.0 - 34.0 pg    MCHC 29.9 (L) 32.0 - 36.0 g/dL    RDW 13.3 11.5 - 14.5 %    Platelets 242 150 - 450 x10*3/uL    Neutrophils % 67.0 40.0 - 80.0 %    Immature Granulocytes %, Automated 0.5 0.0 - 0.9 %    Lymphocytes % 21.7 13.0 - 44.0 %    Monocytes % 10.4 2.0 - 10.0 %    Eosinophils % 0.1 0.0 - 6.0 %    Basophils % 0.3 0.0 - 2.0 %    Neutrophils Absolute 9.52 (H) 1.20 - 7.70 x10*3/uL    Immature Granulocytes Absolute, Automated 0.07 0.00 - 0.70 x10*3/uL    Lymphocytes Absolute 3.09 1.20 - 4.80 x10*3/uL    Monocytes Absolute 1.48 (H) 0.10 - 1.00 x10*3/uL    Eosinophils Absolute 0.02 0.00 - 0.70 x10*3/uL    Basophils Absolute 0.04 0.00 - 0.10 x10*3/uL   Comprehensive Metabolic Panel   Result Value Ref Range    Glucose 353 (H) 74 - 99 mg/dL    Sodium 135 (L) 136 - 145 mmol/L    Potassium 5.3 3.5 - 5.3 mmol/L    Chloride 99 98 - 107 mmol/L    Bicarbonate 22 21 - 32 mmol/L    Anion Gap 19 10 - 20 mmol/L    Urea Nitrogen 34 (H) 6 - 23 mg/dL    Creatinine 1.60 (H) 0.50 - 1.30 mg/dL    eGFR 48 (L) >60 mL/min/1.73m*2    Calcium 8.9 8.6 - 10.3 mg/dL     Albumin 3.5 3.4 - 5.0 g/dL    Alkaline Phosphatase 80 33 - 136 U/L    Total Protein 6.1 (L) 6.4 - 8.2 g/dL    AST 31 9 - 39 U/L    Bilirubin, Total 0.4 0.0 - 1.2 mg/dL    ALT 25 10 - 52 U/L   Brain Natriuretic Peptide   Result Value Ref Range     (H) 0 - 99 pg/mL   Protime-INR   Result Value Ref Range    Protime 12.6 9.8 - 12.8 seconds    INR 1.1 0.9 - 1.1   Lactate   Result Value Ref Range    Lactate 5.8 (HH) 0.4 - 2.0 mmol/L   Type and Screen   Result Value Ref Range    ABO TYPE O     Rh TYPE POS     ANTIBODY SCREEN NEG    Troponin I, High Sensitivity, Initial   Result Value Ref Range    Troponin I, High Sensitivity 25 (H) 0 - 20 ng/L   Blood Gas Arterial Full Panel   Result Value Ref Range    POCT pH, Arterial 7.05 (LL) 7.38 - 7.42 pH    POCT pCO2, Arterial 106 (HH) 38 - 42 mm Hg    POCT pO2, Arterial 99 (H) 85 - 95 mm Hg    POCT SO2, Arterial 97 94 - 100 %    POCT Oxy Hemoglobin, Arterial 94.7 94.0 - 98.0 %    POCT Hematocrit Calculated, Arterial 32.0 (L) 41.0 - 52.0 %    POCT Sodium, Arterial 136 136 - 145 mmol/L    POCT Potassium, Arterial 4.1 3.5 - 5.3 mmol/L    POCT Chloride, Arterial 99 98 - 107 mmol/L    POCT Ionized Calcium, Arterial 1.39 (H) 1.10 - 1.33 mmol/L    POCT Glucose, Arterial 370 (H) 74 - 99 mg/dL    POCT Lactate, Arterial 6.9 (HH) 0.4 - 2.0 mmol/L    POCT Base Excess, Arterial -2.9 (L) -2.0 - 3.0 mmol/L    POCT HCO3 Calculated, Arterial 29.3 (H) 22.0 - 26.0 mmol/L    POCT Hemoglobin, Arterial 10.5 (L) 13.5 - 17.5 g/dL    POCT Anion Gap, Arterial 12 10 - 25 mmo/L    Patient Temperature      FiO2 100 %    Apparatus AMBU     Critical Called By FRANKY EDDY RRT     Critical Called To PIERO CHAPARRO     Critical Call Time 138     Critical Read Back Y    Red Top   Result Value Ref Range    Extra Tube Hold for add-ons.    SST TOP   Result Value Ref Range    Extra Tube Hold for add-ons.    Urinalysis with Reflex Microscopic   Result Value Ref Range    Color, Urine Yellow Straw, Yellow    Appearance,  Urine Clear Clear    Specific Gravity, Urine 1.016 1.005 - 1.035    pH, Urine 5.0 5.0, 5.5, 6.0, 6.5, 7.0, 7.5, 8.0    Protein, Urine 100 (2+) (N) NEGATIVE mg/dL    Glucose, Urine 50 (1+) (A) NEGATIVE mg/dL    Blood, Urine NEGATIVE NEGATIVE    Ketones, Urine NEGATIVE NEGATIVE mg/dL    Bilirubin, Urine NEGATIVE NEGATIVE    Urobilinogen, Urine <2.0 <2.0 mg/dL    Nitrite, Urine NEGATIVE NEGATIVE    Leukocyte Esterase, Urine NEGATIVE NEGATIVE   Microscopic Only, Urine   Result Value Ref Range    WBC, Urine 1-5 1-5, NONE /HPF    RBC, Urine 1-2 NONE, 1-2, 3-5 /HPF    Hyaline Casts, Urine 3+ (A) NONE /LPF   Blood Gas Arterial Full Panel Unsolicited   Result Value Ref Range    POCT pH, Arterial 7.23 (LL) 7.38 - 7.42 pH    POCT pCO2, Arterial 69 (H) 38 - 42 mm Hg    POCT pO2, Arterial 61 (L) 85 - 95 mm Hg    POCT SO2, Arterial 87 (L) 94 - 100 %    POCT Oxy Hemoglobin, Arterial 85.0 (L) 94.0 - 98.0 %    POCT Hematocrit Calculated, Arterial 32.0 (L) 41.0 - 52.0 %    POCT Sodium, Arterial 135 (L) 136 - 145 mmol/L    POCT Potassium, Arterial 4.3 3.5 - 5.3 mmol/L    POCT Chloride, Arterial 100 98 - 107 mmol/L    POCT Ionized Calcium, Arterial 1.25 1.10 - 1.33 mmol/L    POCT Glucose, Arterial 389 (H) 74 - 99 mg/dL    POCT Lactate, Arterial 2.5 (H) 0.4 - 2.0 mmol/L    POCT Base Excess, Arterial 0.1 -2.0 - 3.0 mmol/L    POCT HCO3 Calculated, Arterial 28.9 (H) 22.0 - 26.0 mmol/L    POCT Hemoglobin, Arterial 10.8 (L) 13.5 - 17.5 g/dL    POCT Anion Gap, Arterial 10 10 - 25 mmo/L    Patient Temperature      FiO2 100 %    Ventilator Mode A/C     Ventilator Rate 22 bpm    Tidal Volume 500 mL    Peep CHM2O 10.0 cm H2O    Critical Called By MARY     Critical Called To DR VARGAS     Critical Call Time 314     Critical Read Back Y     Mohamud's Test Y    MRSA Surveillance for Vancomycin De-escalation, PCR    Specimen: Anterior Nares; Swab   Result Value Ref Range    MRSA PCR Not Detected Not Detected     XR abdomen 1 view    Result Date:  4/1/2024  Interpreted By:  Michael Brito, STUDY: XR ABDOMEN 1 VIEW; ;  4/1/2024 2:25 am   INDICATION: Signs/Symptoms:og placement.   COMPARISON: 03/29/2024   ACCESSION NUMBER(S): [id number]   ORDERING CLINICIAN: FIFI CHAPARRO   FINDINGS: Single AP radiograph of the right upper abdomen. NG/OG tube tip projects over the distal esophagus. Streaky bibasilar airspace opacities. Upper abdomen is unremarkable. Remote right anterior rib fractures.       1. NG/OG tube tip projects over the distal esophagus. Consider advancement.   Signed by: Michael Brito 4/1/2024 3:06 AM Dictation workstation:   [id number]    ECG 12 lead    Result Date: 3/31/2024  Sinus rhythm with occasional Premature ventricular complexes Right bundle branch block Abnormal ECG When compared with ECG of 27-NOV-2017 11:13, Premature ventricular complexes are now Present QRS axis Shifted left    ECG 12 lead    Result Date: 3/31/2024  Normal sinus rhythm Right bundle branch block Anteroseptal infarct , age undetermined Abnormal ECG When compared with ECG of 29-MAR-2024 12:10, (unconfirmed) Premature ventricular complexes are no longer Present Anteroseptal infarct is now Present    Transthoracic Echo (TTE) Complete    Result Date: 3/30/2024            [facility] [place] [address] [address]    Tel [phone] Fax [phone] TRANSTHORACIC ECHOCARDIOGRAM REPORT  Patient Name:      PIPER Gimenez Physician:    [id number] Feliberto Roberts MD Study Date:        3/30/2024             Ordering Provider:    [id number] ELISE FONG MRN/PID:           [id number]              Fellow: Accession#:        [id number]          Nurse: Date of Birth/Age: 1960 / 63 years Sonographer:          Cleo Villarreal RDCS Gender:            M                     Additional Staff: Height:            175.26 cm              Admit Date:           3/29/2024 Weight:            136.08 kg             Admission Status:     Inpatient -                                                                Priority                                                                discharge BSA / BMI:         2.45 m2 / 44.30 kg/m2 Department Location:  [address] Blood Pressure: 138 /65 mmHg Study Type:    TRANSTHORACIC ECHO (TTE) COMPLETE Diagnosis/ICD: Other ill defined heart diseases-I51.89 Indication:    Congestive Heart Failure CPT Codes:     Echo Complete w Full [id number] Patient History: Diabetes:          Yes BMI:               Overweight 25 - 30 Pertinent History: HTN, Hyperlipidemia, COPD and Dyspnea. HARRIETT; Parkinsons                    disease; no previous echocardiogram. Study Detail: The following Echo studies were performed: 2D, M-Mode, Doppler and               color flow. Technically challenging study due to body habitus and               patient lying in supine position.  PHYSICIAN INTERPRETATION: Left Ventricle: Left ventricular systolic function is normal, with an estimated ejection fraction of 55-60%. There are no regional wall motion abnormalities. The left ventricular cavity size is normal. Spectral Doppler shows a normal pattern of left ventricular diastolic filling. Left Atrium: The left atrium was not well visualized. Right Ventricle: The right ventricle is mildly enlarged. Not well visualized. Right Atrium: The right atrium was not well visualized. Aortic Valve: The aortic valve appears structurally normal. There is no evidence of aortic valve regurgitation. The peak instantaneous gradient of the aortic valve is 9.4 mmHg. Mitral Valve: The mitral valve is normal in structure. There is trace mitral valve regurgitation. Tricuspid Valve: The tricuspid valve is structurally normal. There is trace tricuspid regurgitation. The estimated RVSP is 46 mm. Pulmonic Valve: The pulmonic valve is structurally normal. There is no  indication of pulmonic valve regurgitation. Pericardium: There is no pericardial effusion noted. Aorta: The aortic root is normal.  CONCLUSIONS:  1. Left ventricular systolic function is normal with a 55-60% estimated ejection fraction.  2. The estimated RVSP is 46 mm. QUANTITATIVE DATA SUMMARY: 2D MEASUREMENTS:                           Normal Ranges: LAs:           5.54 cm    (2.7-4.0cm) IVSd:          0.97 cm    (0.6-1.1cm) LVPWd:         1.14 cm    (0.6-1.1cm) LVIDd:         6.19 cm    (3.9-5.9cm) LVIDs:         4.24 cm LV Mass Index: 114.1 g/m2 LV % FS        31.6 % LA VOLUME:                               Normal Ranges: LA Vol A4C:        72.5 ml    (22+/-6mL/m2) LA Vol Index A4C:  29.6 ml/m2 LA Area A4C:       23.0 cm2 LA Major Axis A4C: 6.2 cm LA Vol A4C:        65.3 ml AORTA MEASUREMENTS:                      Normal Ranges: Ao Sinus, d: 3.20 cm (2.1-3.5cm) Ao STJ, d:   2.70 cm (1.7-3.4cm) Asc Ao, d:   2.80 cm (2.1-3.4cm) LV SYSTOLIC FUNCTION BY 2D PLANIMETRY (MOD):                     Normal Ranges: EF-A4C View: 49.8 % (>=55%) LV DIASTOLIC FUNCTION:                               Normal Ranges: MV Peak E:        1.04 m/s    (0.7-1.2 m/s) MV Peak A:        0.41 m/s    (0.42-0.7 m/s) E/A Ratio:        2.51        (1.0-2.2) MV lateral e'     0.07 m/s PulmV Sys Michel:    31.29 cm/s PulmV Blair Michel:   66.92 cm/s PulmV S/D Michel:    0.47 PulmV A Revs Michel: 19.62 cm/s PulmV A Revs Dur: 105.61 msec MITRAL VALVE:                 Normal Ranges: MV DT: 188 msec (150-240msec) AORTIC VALVE:                         Normal Ranges: AoV Vmax:      1.53 m/s (<=1.7m/s) AoV Peak P.4 mmHg (<20mmHg) LVOT Max Micehl:  0.80 m/s (<=1.1m/s) LVOT VTI:      15.43 cm LVOT Diameter: 1.76 cm  (1.8-2.4cm) AoV Area,Vmax: 1.26 cm2 (2.5-4.5cm2)  RIGHT VENTRICLE: RV Basal 5.80 cm RV Mid   5.70 cm RV Major 7.4 cm TAPSE:   17.0 mm RV s'    0.11 m/s TRICUSPID VALVE/RVSP:                             Normal Ranges: Peak TR Velocity: 3.09 m/s RV Syst  Pressure: 46.1 mmHg (< 30mmHg) PULMONIC VALVE:                         Normal Ranges: PV Accel Time: 128 msec (>120ms) PV Max Michel:    1.1 m/s  (0.6-0.9m/s) PV Max P.0 mmHg Pulmonary Veins: PulmV A Revs Dur: 105.61 msec PulmV A Revs Michel: 19.62 cm/s PulmV Bliar Michel:   66.92 cm/s PulmV S/D Michel:    0.47 PulmV Sys Michel:    31.29 cm/s AORTA: Asc Ao Diam 2.83 cm  [id number] Feliberto Roberts MD Electronically signed on 3/30/2024 at 3:13:31 PM  ** Final **     CT angio chest for pulmonary embolism    Result Date: 3/29/2024  STUDY: CT Angiogram of the Chest; 2024, 1:44 PM. INDICATION: Hypoxia ,shortness of breath, elevate D-dimer. COMPARISON: CXR: 2024. ACCESSION NUMBER(S): [id number] ORDERING CLINICIAN: DARCY CLEVELAND TECHNIQUE:  CTA of the chest was performed with intravenous contrast. Images are reviewed and processed at a workstation according to the CT angiogram protocol with 3-D and/or MIP post processing imaging generated.  Omnipaque 350 60 mL was administered intravenously. Automated mA/kV exposure control was utilized and patient examination was performed in strict accordance with principles of ALARA. FINDINGS: Pulmonary arteries are adequately opacified without acute or chronic filling defects.  The thoracic aorta is normal in course and caliber without dissection or aneurysm. Atherosclerosis of the coronary arteries is present.  The heart is mildly enlarged without pericardial effusion.  Prominent mediastinal lymph nodes are present, likely reactive. Representative subcarinal lymph node measures 1.8 cm short axis dimension. Mild atelectatic changes are present.  Trace bilateral pleural effusions are present. There is no pneumothorax.  The airways are patent. No consolidation, interstitial disease, or suspicious nodules. Low-attenuation lesion about the anterior aspect of the LEFT hepatic lobe measures 3.2 cm in size, most likely cysts. There are no acute fractures.  No suspicious bony lesions.   Senescent changes of the thoracic spine are present.      1. There is no evidence of pulmonary embolus. 2. Mild cardiomegaly. Atherosclerosis of the coronary arteries is present. Trace bilateral pleural effusions are present. 3. Mediastinal adenopathy, likely reactive. Signed by Danny Gtz II, MD    XR chest 1 view    Result Date: 3/29/2024  STUDY: Chest Radiograph;  3/29/2024 12:34 PM. INDICATION: Chest pain. COMPARISON: None Available. ACCESSION NUMBER(S): [id number] ORDERING CLINICIAN: DARCY CLEVELAND TECHNIQUE:  Frontal chest was obtained at 12:34 hours. FINDINGS: CARDIOMEDIASTINAL SILHOUETTE: Cardiomediastinal silhouette is normal in size and configuration.  LUNGS: Lungs are clear.  ABDOMEN: No remarkable upper abdominal findings.  BONES: No acute osseous changes.  Postoperative changes of the right shoulder.    No acute cardiopulmonary abnormality. Signed by Sae Garcia MD        Assessment /Plan    Principal Problem:    Cardiac arrest (CMS/[facility])  Active Problems:    COPD exacerbation (CMS/[facility])    HTN (hypertension)    Hyperlipidemia    Class 3 severe obesity due to excess calories with body mass index (BMI) of 40.0 to 44.9 in adult (CMS/HCC)    Parkinson disease (CMS/HCC)    Type 2 diabetes mellitus (CMS/[facility])    Acute hypoxic respiratory failure (CMS/[facility])      Neuro:   # Parkinson's disease  # MDD  # Sedated secondary to mechanical ventilation  -History: None  -Home meds: Sinemet  mg tablet 3 times daily, Wellbutrin  mg 12-hour tablet twice daily (medication reconciliation not completed by pharmacy)  -GCS: E 1 V 1 M 1  -Pain: None  -Sedation: Fentanyl and propofol infusions for RASS score between 0 to -2  -CIWA/COWS: None  -[facility] ICU    Cardiac:   #Status post cardiac arrest  #HLD  #Essential HTN  - Goals:  [MAP> 65, HR ]  - Home meds: Norvasc 10 mg tablet daily, atenolol 100 mg tablet daily, atorvastatin 2 mg tablet daily, Cozaar 1 mg tablet daily  - Last echo: 3/30/2024 LVEF  55-60%, estimated RVSP 46 mm.  Aortic root normal no regional wall motion abnormality LV cavity is normal size  - Pressors: None  -S/p 2 rounds of epinephrine, 3 amps of bicarb, 1 of calcium gluconate completing 2 rounds of CPR regaining ROSC between 2 and 3 round of CPR  -Targeted temperature cooling status post rest per ICU protocol initiated  -General cardiology consulted    Pulmonary:   #Mechanically ventilated  #COPD not on home O2  #HARRIETT on BiPAP nightly  -Home meds: Advair discus 500-50 micrograms-dose 1 puff twice daily, Singulair 10 mg tablet daily, albuterol 90 mcg rescue inhaler  Vent Mode: Volume control/assist control  FiO2 (%):  [100 %] 100 %  S RR:  [22] 22  S VT:  [500 mL] 500 mL  PEEP/CPAP (cm H2O):  [10 cm H20] 10 cm H20  MAP (cm H2O):  [18] 18   Continuous pulse oximetry   O2 PRN to maintain SpO2 > 94%, wean as tolerated  -Imaging: CXR official read not back, appears mildly worsened pulmonary congestion versus questionable infiltrate  -S/p DuoNeb x 1 in the ED  --Ordered pneumonia assessment with procalcitonin, Legionella and strep pneumoniae urine antigen      Gastrointestinal:   #GERD  -History: none  -Home meds: none  - Diet: NPO  - Supplementation: None  - Prophylaxis: Protonix 40 mg IV daily  - Bowel regimen: MiraLAX 17 g daily  - Last BM: ?    Renal:   #Nonoliguric OUSMANE  -sCr from 3/29/2024 was 1.37 with unknown baseline and stated hx of CKD  - Daily RFP,Mg,Phos  - Home meds: Pulmonary with Lasix 2 times daily (hold), Januvia 1 mg tablet daily (Hold)  - Indwelling Parker with strict I/O  Net IO Since Admission: -300 mL [04/01/24 0605]  Results from last 72 hours   Lab Units 04/01/24  0139 03/31/24  0545   BUN mg/dL 34* 30*   CREATININE mg/dL 1.60* 1.39*         - Fluids: s/p 2L of NS in the ED  - Electrolytes: Replete per protocol, goal K>4 Phos >3 Mg >2    Endocrine:   #IDDM type II  -Home meds: 50 units Lantus daily? (Review of record but not on med list), glipizide (Hold), metformin (Hold),  Ozempic (Hold), Januvia (hold) --noting med reconciliation has yet to be completed  -sliding scale: Moderate SSI #3, accu-checks every 4 hours, with hypoglycemic protocols in place  Results from last 7 days   Lab Units 24  0139 24  1146 24  0734 24  0545 24  2135 24  1709 24  1206 24  0803   POCT GLUCOSE mg/dL  --  304* 138*  --  266* 376* 235* 268*   GLUCOSE mg/dL 353*  --   --  135*  --   --   --   --       -BG < 180 goal    Hematology:   - Daily CBC, coags  -History: None  -Home meds: None  Results from last 7 days   Lab Units 24  01324  0545   HEMOGLOBIN g/dL 10.5* 10.1*   HEMATOCRIT % 35.1* 32.3*   PLATELETS AUTO x10*3/uL 242 210     - DVT Prophylaxis: Enoxaparin 40 mg daily, SCDs    Infectious Disease:   #Questionable community acquired pneumonia  #Acute leukocytosis ? Reactive 2/2 recent systemic steroid therapy   -History: Recurrent COPD exacerbations treated with anti-inflammatories and antibiotics (noting most recent treatment initiated on 3/29 -4-day course of methylprednisolone and azithromycin)  -Home meds: as above  Results from last 7 days   Lab Units 24  01324  0545   WBC AUTO x10*3/uL 14.2* 8.5     Temp (24hrs), Av.1 °C (96.9 °F), Min:36 °C (96.8 °F), Max:36.1 °C (97 °F)     -Abx: S/p azithromycin 500 mg in the ED, 2 g vancomycin  -Cultures: Blood cultures pending    Musculoskeletal:   #History of diabetic foot wound  -Review of clinical record shows patient has a ulcer to the plantar first digit right foot measuring one by one by half centimeter with appropriate granulation (2023)  -Home meds: none  - PT to see Yes when extubated and appropriate (will hold order in the meantime)  - OT to see Yes when extubated and appropriate       Lines/Tubes/Drains:   Peripheral Ivs  Arterial line left radial artery (2024)    Disposition:    CODE STATUS: Full Code    ABCDEF Checklist  Analgesia: Spontaneous awakening trial to  be pursued if clinically appropriate. RASS goal reviewed  Breathing: Spontaneous breathing trial to be pursued if clinically appropriate. Mechanical power of assisted ventilation reviewed  Choice of analgesia/sedation: Analgesic and sedative agents adjusted per clinical context.   Delirium assessed by CAM, will avoid exacerbating factors  Early mobility and exercise: Physical and occupational therapy engaged  Family: Plan of care, overall trajectory of patient shared with family. Questions elicited and answered as appropriate.       Due to the high probability of life threatening clinical decompensation, the patient required critical care time evaluating and managing this patient.  Critical care time included obtaining a history, examining the patient, ordering and reviewing studies, discussing, developing, and implementing a management plan, evaluating the patient's response to treatment, and discussion with other care team providers. I saw and evaluated the patient myself.  Critical care time was performed exclusive of billable procedures.      Agustín Reed PGY-2  Critical Care Medicine [facility]     I reviewed the resident/fellow's documentation and discussed the patient with the resident/fellow. I agree with the resident/fellow's medical decision making as documented in the note.    Critical care time : 35 min    Bambi Jo MD

## 2024-04-01 NOTE — PROCEDURES
Arterial line placement:    A time out was preformed identifying the correct procedure, the correct location with the nursing staff.  The LEFT wrist and forearm was prepped with 2% chlorhexidine and draped with a sterile sheet in the usual fashion. 1% lidocaine was administered subcutaneously for local anesthesia. The Left radial artery was visualized and cannulated under ultrasound guidance and a catheter was placed over a wire, with return of pulsatile red blood. The catheter was secured in place and a sterile dressing was applied over the site prior to removal of drapes. The line flushes and draws back blood easily. The catheter was attached to a monitor, which revealed an appropriate arterial waveform.     The patient tolerated the procedure well and there were no complications.  Estimated blood loss: 5 ml       Agustín Reed,   Internal Medicine, PGY-2

## 2024-04-01 NOTE — PROGRESS NOTES
Spiritual Care Visit    Clinical Encounter Type  Visited With: Patient and family together, Health care provider  Crisis Visit: ED    Mandaeism Encounters  Mandaeism Needs: Prayer         Sacramental Encounters  Communion: Patient is currently unable  Communion Given Indicator: No  Sacrament of Sick-Anointing: Anointed                             Taxonomy  Intended Effects: Establish rapport and connectedness, Meaning-making, Cici affirmation, Build relationship of care and support, Helping someone feel comforted, Convey a calming presence, Promote sense of peace, Lessen anxiety, Demonstrate caring and concern    I was called in at 2 a.m. and, at the family's request, administered the last anointing.

## 2024-04-01 NOTE — CONSULTS
Consults    Reason For Consult  Reason for Consult: communication / medical decision making and patient/family support     History Of Present Illness  Gilberto Albarran is a 63 y.o. male with past medical history of Chronic Obstructive Pulmonary Disease (COPD) is presenting with shortness of breath and subsequent cardiac arrest from CO2 narcosis.      Symptoms (0 - 10, Best to Worst)  [employer] Symptom Assessment System  Pain Score: 0 - No pain    BM in last 48 hours? unknown    Spiritual Hx:  Pastoral Care rounded and visited with patient and family to offer support.          Personal/Social History   He reports that he has quit smoking. His smoking use included cigarettes. He smoked an average of 1 pack per day. He does not have any smokeless tobacco history on file. He reports current alcohol use of about 5.0 standard drinks of alcohol per week. He reports that he does not use drugs.    Functional Status        Caregiving/Caregiver Support  Does the patient require assistance in some or all components of his care, including coordination of medical care? Yes  If Yes, which person serves that role?  spouse   Caregiver emotional or practical needs:      Past Medical History  He has a past medical history of Chronic obstructive pulmonary disease, unspecified (CMS/[facility]) (01/20/2022), Daily consumption of alcohol, Diplopia (12/04/2020), Esophageal reflux, Essential (primary) hypertension, Essential hypertension, History of rib fracture, Hyperlipidemia, mixed, Insomnia, MDD (major depressive disorder), Morbid obesity (CMS/[facility]), HARRIETT (obstructive sleep apnea), Parkinson disease, Slow transit constipation, Type 2 diabetes mellitus without complications (CMS/[facility]) (01/20/2022), Unspecified exotropia (12/04/2020), Vitamin B12 deficiency, and Vitamin D deficiency.    Surgical History  He has a past surgical history that includes Other surgical history (09/04/2020).     Family History  Family History   Problem Relation Name Age of  Onset    Cataracts Mother      Other (HTN, DM) Mother      Parkinsonism Father      Colon cancer Other       Allergies  Patient has no known allergies.    Review of Systems   Unable to perform ROS: Patient unresponsive        Physical Exam  Constitutional:       Appearance: He is ill-appearing.      Comments: Unresponsive on the vent s/p full arrest.    HENT:      Head: Normocephalic.      Nose: Nose normal.      Mouth/Throat:      Mouth: Mucous membranes are dry.   Cardiovascular:      Rate and Rhythm: Normal rate and regular rhythm.      Heart sounds: Normal heart sounds.   Pulmonary:      Comments: Patient is vented  Abdominal:      Comments: Abdomen is rounded. BS are positive.    Musculoskeletal:      Comments: Non responsive at this time   Skin:     General: Skin is warm and dry.   Neurological:      Sensory: Sensory deficit present.         Last Recorded Vitals  Blood pressure 117/62, pulse 74, temperature 35.6 °C (96.1 °F), resp. rate 18, SpO2 97 %.    Relevant Results  Scheduled medications  pantoprazole, 40 mg, oral, Daily before breakfast   Or  esomeprazole, 40 mg, nasoduodenal tube, Daily before breakfast   Or  pantoprazole, 40 mg, intravenous, Daily before breakfast  fentaNYL, 25 mcg, intravenous, Once  heparin (porcine), 7,500 Units, subcutaneous, q8h YANIQUE  lactated Ringer's, 1,000 mL, intravenous, Once  methylPREDNISolone sodium succinate (PF), 30 mg, intravenous, BID  oxygen, , inhalation, Continuous - Inhalation  piperacillin-tazobactam, 4.5 g, intravenous, Once  sodium bicarbonate, , ,       Continuous medications  fentaNYL, 0-300 mcg/hr, Last Rate: 150 mcg/hr (04/01/24 1049)  insulin regular infusion, 0-20 Units/hr, Last Rate: 4 Units/hr (04/01/24 1221)  propofol, 5-50 mcg/kg/min, Last Rate: 50 mcg/kg/min (04/01/24 1100)      PRN medications  PRN medications: dextrose, dextrose, dextrose, dextrose, dextrose, dextrose, fentaNYL, glucagon, glucagon, glucagon, glucagon, glucagon, glucagon, insulin  regular, magnesium sulfate, magnesium sulfate, potassium chloride CR **OR** potassium chloride, potassium chloride, sodium bicarbonate  Results for orders placed or performed during the hospital encounter of 04/01/24 (from the past 24 hour(s))   CBC with Differential   Result Value Ref Range    WBC 14.2 (H) 4.4 - 11.3 x10*3/uL    nRBC 0.0 0.0 - 0.0 /100 WBCs    RBC 3.40 (L) 4.50 - 5.90 x10*6/uL    Hemoglobin 10.5 (L) 13.5 - 17.5 g/dL    Hematocrit 35.1 (L) 41.0 - 52.0 %     (H) 80 - 100 fL    MCH 30.9 26.0 - 34.0 pg    MCHC 29.9 (L) 32.0 - 36.0 g/dL    RDW 13.3 11.5 - 14.5 %    Platelets 242 150 - 450 x10*3/uL    Neutrophils % 67.0 40.0 - 80.0 %    Immature Granulocytes %, Automated 0.5 0.0 - 0.9 %    Lymphocytes % 21.7 13.0 - 44.0 %    Monocytes % 10.4 2.0 - 10.0 %    Eosinophils % 0.1 0.0 - 6.0 %    Basophils % 0.3 0.0 - 2.0 %    Neutrophils Absolute 9.52 (H) 1.20 - 7.70 x10*3/uL    Immature Granulocytes Absolute, Automated 0.07 0.00 - 0.70 x10*3/uL    Lymphocytes Absolute 3.09 1.20 - 4.80 x10*3/uL    Monocytes Absolute 1.48 (H) 0.10 - 1.00 x10*3/uL    Eosinophils Absolute 0.02 0.00 - 0.70 x10*3/uL    Basophils Absolute 0.04 0.00 - 0.10 x10*3/uL   Comprehensive Metabolic Panel   Result Value Ref Range    Glucose 353 (H) 74 - 99 mg/dL    Sodium 135 (L) 136 - 145 mmol/L    Potassium 5.3 3.5 - 5.3 mmol/L    Chloride 99 98 - 107 mmol/L    Bicarbonate 22 21 - 32 mmol/L    Anion Gap 19 10 - 20 mmol/L    Urea Nitrogen 34 (H) 6 - 23 mg/dL    Creatinine 1.60 (H) 0.50 - 1.30 mg/dL    eGFR 48 (L) >60 mL/min/1.73m*2    Calcium 8.9 8.6 - 10.3 mg/dL    Albumin 3.5 3.4 - 5.0 g/dL    Alkaline Phosphatase 80 33 - 136 U/L    Total Protein 6.1 (L) 6.4 - 8.2 g/dL    AST 31 9 - 39 U/L    Bilirubin, Total 0.4 0.0 - 1.2 mg/dL    ALT 25 10 - 52 U/L   Brain Natriuretic Peptide   Result Value Ref Range     (H) 0 - 99 pg/mL   Protime-INR   Result Value Ref Range    Protime 12.6 9.8 - 12.8 seconds    INR 1.1 0.9 - 1.1   Lactate    Result Value Ref Range    Lactate 5.8 (HH) 0.4 - 2.0 mmol/L   Type and Screen   Result Value Ref Range    ABO TYPE O     Rh TYPE POS     ANTIBODY SCREEN NEG    Troponin I, High Sensitivity, Initial   Result Value Ref Range    Troponin I, High Sensitivity 25 (H) 0 - 20 ng/L   Blood Gas Arterial Full Panel   Result Value Ref Range    POCT pH, Arterial 7.05 (LL) 7.38 - 7.42 pH    POCT pCO2, Arterial 106 (HH) 38 - 42 mm Hg    POCT pO2, Arterial 99 (H) 85 - 95 mm Hg    POCT SO2, Arterial 97 94 - 100 %    POCT Oxy Hemoglobin, Arterial 94.7 94.0 - 98.0 %    POCT Hematocrit Calculated, Arterial 32.0 (L) 41.0 - 52.0 %    POCT Sodium, Arterial 136 136 - 145 mmol/L    POCT Potassium, Arterial 4.1 3.5 - 5.3 mmol/L    POCT Chloride, Arterial 99 98 - 107 mmol/L    POCT Ionized Calcium, Arterial 1.39 (H) 1.10 - 1.33 mmol/L    POCT Glucose, Arterial 370 (H) 74 - 99 mg/dL    POCT Lactate, Arterial 6.9 (HH) 0.4 - 2.0 mmol/L    POCT Base Excess, Arterial -2.9 (L) -2.0 - 3.0 mmol/L    POCT HCO3 Calculated, Arterial 29.3 (H) 22.0 - 26.0 mmol/L    POCT Hemoglobin, Arterial 10.5 (L) 13.5 - 17.5 g/dL    POCT Anion Gap, Arterial 12 10 - 25 mmo/L    Patient Temperature      FiO2 100 %    Apparatus AMBU     Critical Called By FRANKY EDDY RRT     Critical Called To PIERO CHAPARRO     Critical Call Time 138     Critical Read Back Y    Blood Culture    Specimen: Peripheral Venipuncture; Blood culture   Result Value Ref Range    Blood Culture Loaded on Instrument - Culture in progress    Red Top   Result Value Ref Range    Extra Tube Hold for add-ons.    SST TOP   Result Value Ref Range    Extra Tube Hold for add-ons.    Urinalysis with Reflex Microscopic   Result Value Ref Range    Color, Urine Yellow Straw, Yellow    Appearance, Urine Clear Clear    Specific Gravity, Urine 1.016 1.005 - 1.035    pH, Urine 5.0 5.0, 5.5, 6.0, 6.5, 7.0, 7.5, 8.0    Protein, Urine 100 (2+) (N) NEGATIVE mg/dL    Glucose, Urine 50 (1+) (A) NEGATIVE mg/dL    Blood, Urine  NEGATIVE NEGATIVE    Ketones, Urine NEGATIVE NEGATIVE mg/dL    Bilirubin, Urine NEGATIVE NEGATIVE    Urobilinogen, Urine <2.0 <2.0 mg/dL    Nitrite, Urine NEGATIVE NEGATIVE    Leukocyte Esterase, Urine NEGATIVE NEGATIVE   Urine Gray Tube   Result Value Ref Range    Extra Tube Hold for add-ons.    Microscopic Only, Urine   Result Value Ref Range    WBC, Urine 1-5 1-5, NONE /HPF    RBC, Urine 1-2 NONE, 1-2, 3-5 /HPF    Hyaline Casts, Urine 3+ (A) NONE /LPF   Blood Gas Arterial Full Panel Unsolicited   Result Value Ref Range    POCT pH, Arterial 7.23 (LL) 7.38 - 7.42 pH    POCT pCO2, Arterial 69 (H) 38 - 42 mm Hg    POCT pO2, Arterial 61 (L) 85 - 95 mm Hg    POCT SO2, Arterial 87 (L) 94 - 100 %    POCT Oxy Hemoglobin, Arterial 85.0 (L) 94.0 - 98.0 %    POCT Hematocrit Calculated, Arterial 32.0 (L) 41.0 - 52.0 %    POCT Sodium, Arterial 135 (L) 136 - 145 mmol/L    POCT Potassium, Arterial 4.3 3.5 - 5.3 mmol/L    POCT Chloride, Arterial 100 98 - 107 mmol/L    POCT Ionized Calcium, Arterial 1.25 1.10 - 1.33 mmol/L    POCT Glucose, Arterial 389 (H) 74 - 99 mg/dL    POCT Lactate, Arterial 2.5 (H) 0.4 - 2.0 mmol/L    POCT Base Excess, Arterial 0.1 -2.0 - 3.0 mmol/L    POCT HCO3 Calculated, Arterial 28.9 (H) 22.0 - 26.0 mmol/L    POCT Hemoglobin, Arterial 10.8 (L) 13.5 - 17.5 g/dL    POCT Anion Gap, Arterial 10 10 - 25 mmo/L    Patient Temperature      FiO2 100 %    Ventilator Mode A/C     Ventilator Rate 22 bpm    Tidal Volume 500 mL    Peep CHM2O 10.0 cm H2O    Critical Called By MARY     Critical Called To DR VARGAS     Critical Call Time 314     Critical Read Back Y     Mohamud's Test Y    MRSA Surveillance for Vancomycin De-escalation, PCR    Specimen: Anterior Nares; Swab   Result Value Ref Range    MRSA PCR Not Detected Not Detected   Sars-CoV-2 and Influenza A/B PCR   Result Value Ref Range    Flu A Result Not Detected Not Detected    Flu B Result Not Detected Not Detected    Coronavirus 2019, PCR Not Detected Not Detected    Legionella Antigen, Urine    Specimen: Urine   Result Value Ref Range    L. pneumophila Urine Ag Negative Negative   Streptococcus pneumoniae Antigen, Urine    Specimen: Urine   Result Value Ref Range    Streptococcus pneumoniae Ag, Urine Negative Negative   Troponin, High Sensitivity, 1 Hour   Result Value Ref Range    Troponin I, High Sensitivity 128 (HH) 0 - 20 ng/L   Lactate   Result Value Ref Range    Lactate 1.1 0.4 - 2.0 mmol/L   CBC and Auto Differential   Result Value Ref Range    WBC 10.8 4.4 - 11.3 x10*3/uL    nRBC 0.0 0.0 - 0.0 /100 WBCs    RBC 2.98 (L) 4.50 - 5.90 x10*6/uL    Hemoglobin 9.2 (L) 13.5 - 17.5 g/dL    Hematocrit 29.8 (L) 41.0 - 52.0 %     80 - 100 fL    MCH 30.9 26.0 - 34.0 pg    MCHC 30.9 (L) 32.0 - 36.0 g/dL    RDW 13.2 11.5 - 14.5 %    Platelets 195 150 - 450 x10*3/uL    Neutrophils % 92.5 40.0 - 80.0 %    Immature Granulocytes %, Automated 0.6 0.0 - 0.9 %    Lymphocytes % 2.7 13.0 - 44.0 %    Monocytes % 4.0 2.0 - 10.0 %    Eosinophils % 0.0 0.0 - 6.0 %    Basophils % 0.2 0.0 - 2.0 %    Neutrophils Absolute 9.96 (H) 1.20 - 7.70 x10*3/uL    Immature Granulocytes Absolute, Automated 0.06 0.00 - 0.70 x10*3/uL    Lymphocytes Absolute 0.29 (L) 1.20 - 4.80 x10*3/uL    Monocytes Absolute 0.43 0.10 - 1.00 x10*3/uL    Eosinophils Absolute 0.00 0.00 - 0.70 x10*3/uL    Basophils Absolute 0.02 0.00 - 0.10 x10*3/uL   Renal Function Panel   Result Value Ref Range    Glucose 424 (H) 74 - 99 mg/dL    Sodium 137 136 - 145 mmol/L    Potassium 3.8 3.5 - 5.3 mmol/L    Chloride 104 98 - 107 mmol/L    Bicarbonate 24 21 - 32 mmol/L    Anion Gap 13 10 - 20 mmol/L    Urea Nitrogen 33 (H) 6 - 23 mg/dL    Creatinine 1.37 (H) 0.50 - 1.30 mg/dL    eGFR 58 (L) >60 mL/min/1.73m*2    Calcium 7.2 (L) 8.6 - 10.3 mg/dL    Phosphorus 3.4 2.5 - 4.9 mg/dL    Albumin 3.2 (L) 3.4 - 5.0 g/dL   Blood Gas Arterial Full Panel   Result Value Ref Range    POCT pH, Arterial 7.28 (L) 7.38 - 7.42 pH    POCT pCO2, Arterial 60  (H) 38 - 42 mm Hg    POCT pO2, Arterial 138 (H) 85 - 95 mm Hg    POCT SO2, Arterial 99 94 - 100 %    POCT Oxy Hemoglobin, Arterial 97.1 94.0 - 98.0 %    POCT Hematocrit Calculated, Arterial 31.0 (L) 41.0 - 52.0 %    POCT Sodium, Arterial 134 (L) 136 - 145 mmol/L    POCT Potassium, Arterial 4.3 3.5 - 5.3 mmol/L    POCT Chloride, Arterial 102 98 - 107 mmol/L    POCT Ionized Calcium, Arterial 1.17 1.10 - 1.33 mmol/L    POCT Glucose, Arterial 468 (HH) 74 - 99 mg/dL    POCT Lactate, Arterial 1.2 0.4 - 2.0 mmol/L    POCT Base Excess, Arterial 0.6 -2.0 - 3.0 mmol/L    POCT HCO3 Calculated, Arterial 28.2 (H) 22.0 - 26.0 mmol/L    POCT Hemoglobin, Arterial 10.4 (L) 13.5 - 17.5 g/dL    POCT Anion Gap, Arterial 8 (L) 10 - 25 mmo/L    Patient Temperature      FiO2 100 %    Ventilator Mode A/C     Ventilator Rate 22 bpm    Tidal Volume 450 mL    Peep CHM2O 10.0 cm H2O    Critical Called By FRANKY EDDY [facility]     Critical Called To DR. ZEPEDA     Critical Call Time 622     Critical Read Back Y    POCT GLUCOSE   Result Value Ref Range    POCT Glucose 413 (H) 74 - 99 mg/dL   Blood Gas Arterial Full Panel   Result Value Ref Range    POCT pH, Arterial 7.29 (L) 7.38 - 7.42 pH    POCT pCO2, Arterial 61 (H) 38 - 42 mm Hg    POCT pO2, Arterial 85 85 - 95 mm Hg    POCT SO2, Arterial 98 94 - 100 %    POCT Oxy Hemoglobin, Arterial 95.5 94.0 - 98.0 %    POCT Hematocrit Calculated, Arterial 31.0 (L) 41.0 - 52.0 %    POCT Sodium, Arterial 135 (L) 136 - 145 mmol/L    POCT Potassium, Arterial 4.2 3.5 - 5.3 mmol/L    POCT Chloride, Arterial 102 98 - 107 mmol/L    POCT Ionized Calcium, Arterial 1.16 1.10 - 1.33 mmol/L    POCT Glucose, Arterial 479 (HH) 74 - 99 mg/dL    POCT Lactate, Arterial 1.9 0.4 - 2.0 mmol/L    POCT Base Excess, Arterial 1.8 -2.0 - 3.0 mmol/L    POCT HCO3 Calculated, Arterial 29.3 (H) 22.0 - 26.0 mmol/L    POCT Hemoglobin, Arterial 10.2 (L) 13.5 - 17.5 g/dL    POCT Anion Gap, Arterial 8 (L) 10 - 25 mmo/L    Patient Temperature       FiO2 60 %    Ventilator Rate 16 bpm    Tidal Volume 500 mL    Peep CHM2O 10.0 cm H2O    Critical Called By JOAN     Critical Called To DR PULIDO     Critical Call Time 938     Critical Read Back Y    POCT GLUCOSE   Result Value Ref Range    POCT Glucose 437 (H) 74 - 99 mg/dL   POCT GLUCOSE   Result Value Ref Range    POCT Glucose 385 (H) 74 - 99 mg/dL   POCT GLUCOSE   Result Value Ref Range    POCT Glucose 391 (H) 74 - 99 mg/dL    CT head wo IV contrast    Result Date: 4/1/2024  Interpreted By:  Isaiah Reynolds, STUDY: CT HEAD WO IV CONTRAST;  4/1/2024 4:40 am   INDICATION: Signs/Symptoms:cardiac arrest. myoclonic jerking.   COMPARISON: 10/25/2023   ACCESSION NUMBER(S): [id number]   ORDERING CLINICIAN: FIFI CHAPARRO   TECHNIQUE: Noncontrast axial CT scan of head was performed. Angled reformats in brain and bone windows were generated. The images were reviewed in bone, brain, blood and soft tissue windows.   FINDINGS: Global volume loss and moderate chronic small vessel ischemic change as seen on prior brain MRI.   No findings of acute hemorrhage. No mass effect. No midline shift. Stable ventricular size   If persistent concern, consider MRI. Mastoid air cells and paranasal sinuses are clear for mild ethmoid sinus mucosal thickening       Senescent changes. No evidence of acute intracranial abnormality. If persistent concern, further evaluation is advised with MRI   No evidence of intracranial hemorrhage or displaced skull fracture.   MACRO: None   Signed by: Isaiah Reynolds 4/1/2024 6:13 AM Dictation workstation:   [id number]    XR chest 1 view    Result Date: 4/1/2024  STUDY: Chest Radiograph;  4/1/2024 INDICATION: Chest pain. COMPARISON: 3/29/2024 XR Chest ACCESSION NUMBER(S): [id number] ORDERING CLINICIAN: FIFI CHAPARRO TECHNIQUE:  Frontal chest was obtained at 01:52 hours. FINDINGS: Endotracheal tube tip is approximately 4.5 cm above the mee. Nasogastric tube tip identified to the distal esophagus. Mild  cardiac enlargement.  Patchy bilateral airspace disease.  No discernible pleural effusion or pneumothorax.    Patchy bilateral airspace disease.  Congestive failure vs. pneumonia. Correlate clinically. Endotracheal tube tip is approximately 4.5 cm above the mee.  Nasogastric tube tip identified to the distal esophagus.  Continued advancement recommended. Signed by Gilberto Parham MD    XR abdomen 1 view    Result Date: 4/1/2024  Interpreted By:  Michael Brito, STUDY: XR ABDOMEN 1 VIEW; ;  4/1/2024 2:25 am   INDICATION: Signs/Symptoms:og placement.   COMPARISON: 03/29/2024   ACCESSION NUMBER(S): [id number]   ORDERING CLINICIAN: FIFI CHAPARRO   FINDINGS: Single AP radiograph of the right upper abdomen. NG/OG tube tip projects over the distal esophagus. Streaky bibasilar airspace opacities. Upper abdomen is unremarkable. Remote right anterior rib fractures.       1. NG/OG tube tip projects over the distal esophagus. Consider advancement.   Signed by: Michael Brito 4/1/2024 3:06 AM Dictation workstation:   [id number]    ECG 12 lead    Result Date: 3/31/2024  Sinus rhythm with occasional Premature ventricular complexes Right bundle branch block Abnormal ECG When compared with ECG of 27-NOV-2017 11:13, Premature ventricular complexes are now Present QRS axis Shifted left    ECG 12 lead    Result Date: 3/31/2024  Normal sinus rhythm Right bundle branch block Anteroseptal infarct , age undetermined Abnormal ECG When compared with ECG of 29-MAR-2024 12:10, (unconfirmed) Premature ventricular complexes are no longer Present Anteroseptal infarct is now Present    Transthoracic Echo (TTE) Complete    Result Date: 3/30/2024            [facility] [place] [address] [facility] [address]    Tel [phone] Fax [phone] TRANSTHORACIC ECHOCARDIOGRAM REPORT  Patient Name:      GILBERTO CERVANTES         Reading Physician:    [id number] Feliberto                                                                 Armando OWEN Study Date:        3/30/2024             Ordering Provider:    [id number] ELISEMARY JUÁREZJUNIOR MRN/PID:           [id number]              Fellow: Accession#:        [id number]          Nurse: Date of Birth/Age: 1960 / 63 years Sonographer:          Cleo Villarreal RDCS Gender:            M                     Additional Staff: Height:            175.26 cm             Admit Date:           3/29/2024 Weight:            136.08 kg             Admission Status:     Inpatient -                                                                Priority                                                                discharge BSA / BMI:         2.45 m2 / 44.30 kg/m2 Department Location:  [address] Blood Pressure: 138 /65 mmHg Study Type:    TRANSTHORACIC ECHO (TTE) COMPLETE Diagnosis/ICD: Other ill defined heart diseases-I51.89 Indication:    Congestive Heart Failure CPT Codes:     Echo Complete w Full [id number] Patient History: Diabetes:          Yes BMI:               Overweight 25 - 30 Pertinent History: HTN, Hyperlipidemia, COPD and Dyspnea. HARRIETT; Parkinsons                    disease; no previous echocardiogram. Study Detail: The following Echo studies were performed: 2D, M-Mode, Doppler and               color flow. Technically challenging study due to body habitus and               patient lying in supine position.  PHYSICIAN INTERPRETATION: Left Ventricle: Left ventricular systolic function is normal, with an estimated ejection fraction of 55-60%. There are no regional wall motion abnormalities. The left ventricular cavity size is normal. Spectral Doppler shows a normal pattern of left ventricular diastolic filling. Left Atrium: The left atrium was not well visualized. Right Ventricle: The right ventricle is mildly enlarged. Not well visualized. Right Atrium: The right atrium was not well visualized. Aortic Valve: The aortic valve appears  structurally normal. There is no evidence of aortic valve regurgitation. The peak instantaneous gradient of the aortic valve is 9.4 mmHg. Mitral Valve: The mitral valve is normal in structure. There is trace mitral valve regurgitation. Tricuspid Valve: The tricuspid valve is structurally normal. There is trace tricuspid regurgitation. The estimated RVSP is 46 mm. Pulmonic Valve: The pulmonic valve is structurally normal. There is no indication of pulmonic valve regurgitation. Pericardium: There is no pericardial effusion noted. Aorta: The aortic root is normal.  CONCLUSIONS:  1. Left ventricular systolic function is normal with a 55-60% estimated ejection fraction.  2. The estimated RVSP is 46 mm. QUANTITATIVE DATA SUMMARY: 2D MEASUREMENTS:                           Normal Ranges: LAs:           5.54 cm    (2.7-4.0cm) IVSd:          0.97 cm    (0.6-1.1cm) LVPWd:         1.14 cm    (0.6-1.1cm) LVIDd:         6.19 cm    (3.9-5.9cm) LVIDs:         4.24 cm LV Mass Index: 114.1 g/m2 LV % FS        31.6 % LA VOLUME:                               Normal Ranges: LA Vol A4C:        72.5 ml    (22+/-6mL/m2) LA Vol Index A4C:  29.6 ml/m2 LA Area A4C:       23.0 cm2 LA Major Axis A4C: 6.2 cm LA Vol A4C:        65.3 ml AORTA MEASUREMENTS:                      Normal Ranges: Ao Sinus, d: 3.20 cm (2.1-3.5cm) Ao STJ, d:   2.70 cm (1.7-3.4cm) Asc Ao, d:   2.80 cm (2.1-3.4cm) LV SYSTOLIC FUNCTION BY 2D PLANIMETRY (MOD):                     Normal Ranges: EF-A4C View: 49.8 % (>=55%) LV DIASTOLIC FUNCTION:                               Normal Ranges: MV Peak E:        1.04 m/s    (0.7-1.2 m/s) MV Peak A:        0.41 m/s    (0.42-0.7 m/s) E/A Ratio:        2.51        (1.0-2.2) MV lateral e'     0.07 m/s PulmV Sys Michel:    31.29 cm/s PulmV Blair Michel:   66.92 cm/s PulmV S/D Michel:    0.47 PulmV A Revs Mcihel: 19.62 cm/s PulmV A Revs Dur: 105.61 msec MITRAL VALVE:                 Normal Ranges: MV DT: 188 msec (150-240msec) AORTIC VALVE:                          Normal Ranges: AoV Vmax:      1.53 m/s (<=1.7m/s) AoV Peak P.4 mmHg (<20mmHg) LVOT Max Michel:  0.80 m/s (<=1.1m/s) LVOT VTI:      15.43 cm LVOT Diameter: 1.76 cm  (1.8-2.4cm) AoV Area,Vmax: 1.26 cm2 (2.5-4.5cm2)  RIGHT VENTRICLE: RV Basal 5.80 cm RV Mid   5.70 cm RV Major 7.4 cm TAPSE:   17.0 mm RV s'    0.11 m/s TRICUSPID VALVE/RVSP:                             Normal Ranges: Peak TR Velocity: 3.09 m/s RV Syst Pressure: 46.1 mmHg (< 30mmHg) PULMONIC VALVE:                         Normal Ranges: PV Accel Time: 128 msec (>120ms) PV Max Michel:    1.1 m/s  (0.6-0.9m/s) PV Max P.0 mmHg Pulmonary Veins: PulmV A Revs Dur: 105.61 msec PulmV A Revs Michel: 19.62 cm/s PulmV Blair Michel:   66.92 cm/s PulmV S/D Michel:    0.47 PulmV Sys Michel:    31.29 cm/s AORTA: Asc Ao Diam 2.83 cm  [id number] Feliberto Roberts MD Electronically signed on 3/30/2024 at 3:13:31 PM  ** Final **     CT angio chest for pulmonary embolism    Result Date: 3/29/2024  STUDY: CT Angiogram of the Chest; 2024, 1:44 PM. INDICATION: Hypoxia ,shortness of breath, elevate D-dimer. COMPARISON: CXR: 2024. ACCESSION NUMBER(S): [id number] ORDERING CLINICIAN: DARCY CLEVELAND TECHNIQUE:  CTA of the chest was performed with intravenous contrast. Images are reviewed and processed at a workstation according to the CT angiogram protocol with 3-D and/or MIP post processing imaging generated.  Omnipaque 350 60 mL was administered intravenously. Automated mA/kV exposure control was utilized and patient examination was performed in strict accordance with principles of ALARA. FINDINGS: Pulmonary arteries are adequately opacified without acute or chronic filling defects.  The thoracic aorta is normal in course and caliber without dissection or aneurysm. Atherosclerosis of the coronary arteries is present.  The heart is mildly enlarged without pericardial effusion.  Prominent mediastinal lymph nodes are present, likely reactive. Representative  subcarinal lymph node measures 1.8 cm short axis dimension. Mild atelectatic changes are present.  Trace bilateral pleural effusions are present. There is no pneumothorax.  The airways are patent. No consolidation, interstitial disease, or suspicious nodules. Low-attenuation lesion about the anterior aspect of the LEFT hepatic lobe measures 3.2 cm in size, most likely cysts. There are no acute fractures.  No suspicious bony lesions.  Senescent changes of the thoracic spine are present.      1. There is no evidence of pulmonary embolus. 2. Mild cardiomegaly. Atherosclerosis of the coronary arteries is present. Trace bilateral pleural effusions are present. 3. Mediastinal adenopathy, likely reactive. Signed by Danny Gtz II, MD    XR chest 1 view    Result Date: 3/29/2024  STUDY: Chest Radiograph;  3/29/2024 12:34 PM. INDICATION: Chest pain. COMPARISON: None Available. ACCESSION NUMBER(S): [id number] ORDERING CLINICIAN: DARCY CLEVELAND TECHNIQUE:  Frontal chest was obtained at 12:34 hours. FINDINGS: CARDIOMEDIASTINAL SILHOUETTE: Cardiomediastinal silhouette is normal in size and configuration.  LUNGS: Lungs are clear.  ABDOMEN: No remarkable upper abdominal findings.  BONES: No acute osseous changes.  Postoperative changes of the right shoulder.    No acute cardiopulmonary abnormality. Signed by Sae Garcia MD       Assessment/Plan   IMP:  Patient has a hx of COPD. It appears he did not use his oxygen at home last evening and fell asleep and woke up in respiratory distress which led to cardiac arrest. Patient is on a vent and currently going through the cooling process. Rewarming will occur after 24 hours of cooling. At that time a repeat CT head will be completed to determine neurologic status. Family is very involved and at bedside.   UPDATE:  Subsequently met with patient's wife. She explains that the patient has been declining medically for some time. He has Parkinson's with hallucinations, experiences  frequent falls, HTN and increasing weakness. Patient was in the hsopital but insisted on going home as he wished to be home for Easter. On Easter Sunday he actually attended a family dinner at his son's home. Upon return home he was quite tired out but often stayed up at night and slept much during the day. His wife went upstairs to bed (he sleeps on the main floor) and around midnight heard him and thought he was attempting to come upstairs which he was unable to do however upon investigation he had fallen and was lodged between a couple pieces of furniture. She was able to assist him in getting out from between the furniture pieces but she called her son to assist her in getting him up from the floor. Her son came from next door and patient was in distress - they then checked his pule ox and patient was saturating in the 70's - so they called a squad. Patient did not have his oxygen on which he was instructed to wear at all times. Once in the ambulance, the patient arrested and was resuscitated and intubated. The patient has a Living Will and did not want these interventions but no one was aware of this until his wife arrived at the ER. At this point the wife is struggling with the dilemma of how much do we do considering his wishes were for comfort measures should he end up in this situation. At present he is being cooled and family is taking this opportunity to gather support one another and navigate the ICU environment in the presence of his wishes. Decision were made for no escalation of care and DNRCCA is now on the EMR. Once cooling and rewarming has occurred, we will address the best course of action. In the meantime, if patient deteriorates, their wishes are to provide comfort focused care rather aggressive interventions as this is not consistent with his wishes.   PLAN  Follow patient and provide open communication and support for family. Track the progress of the patient and inform family of any  concerning events. Discuss Goals of Care appropriate to the patient's condition and response to treatment.     Provider estimate of survival: unknown  Patient Prognostic Awareness: Patient unable to respond    Patient/proxy preference for information  Prefers full information    Goals of Care  Keep family informed on all clinical information as it is received and answer any questions so that they can manage this serious event.    Is the patient hospice-eligible?   Yes  Was a discussion held re hospice services?   no  Was a decision made re hospice services?  Not appropriate at this time until it is determined the ability of the patient to recover from full arrest.    Other Palliative Support  Proved emotional support and good communication for the family.     I spent a total of 90 minutes in the review, planning and care of this patient.         Raeann Morrell, APRN-CNS

## 2024-04-01 NOTE — ED PROVIDER NOTES
HPI   No chief complaint on file.      Patient is a 63-year-old male with COPD, hypertension, hyperlipidemia, diabetes, CKD presenting to the emergency department for cardiac arrest.  EMS was called to the patient's home for shortness of breath.  They were giving him a DuoNeb breathing treatment when he suddenly went unresponsive and lost pulses.  Rhythm was PEA on scene.  By the time the patient arrives to the emergency department, he has received 2 doses of epinephrine and had been intubated.  ACLS was continued in the emergency department.  Patient had been discharged from the hospital 2 days ago for a COPD exacerbation.      History provided by:  Medical records and EMS personnel  History limited by:  Patient unresponsive and intubated                      [place] Coma Scale Score: 15                     Patient History   Past Medical History:   Diagnosis Date    Chronic obstructive pulmonary disease, unspecified (CMS/[facility]) 01/20/2022    Chronic obstructive pulmonary disease, unspecified COPD type    Daily consumption of alcohol     Diplopia 12/04/2020    Diplopia    Esophageal reflux     Essential (primary) hypertension     HTN (hypertension)    Essential hypertension     History of rib fracture     Hyperlipidemia, mixed     Insomnia     MDD (major depressive disorder)     Morbid obesity (CMS/[facility])     HARRIETT (obstructive sleep apnea)     Parkinson disease     Slow transit constipation     Type 2 diabetes mellitus without complications (CMS/[facility]) 01/20/2022    Diabetes mellitus    Unspecified exotropia 12/04/2020    Exotropia    Vitamin B12 deficiency     Vitamin D deficiency      Past Surgical History:   Procedure Laterality Date    OTHER SURGICAL HISTORY  09/04/2020    Rotator cuff repair     Family History   Problem Relation Name Age of Onset    Cataracts Mother      Other (HTN, DM) Mother      Parkinsonism Father      Colon cancer Other       Social History     Tobacco Use    Smoking status: Former     Packs/day:  1     Types: Cigarettes    Smokeless tobacco: Not on file   Substance Use Topics    Alcohol use: Yes     Alcohol/week: 5.0 standard drinks of alcohol     Types: 5 Standard drinks or equivalent per week    Drug use: Never       Physical Exam   ED Triage Vitals   Temp Heart Rate Respirations BP   -- 04/01/24 0134 04/01/24 0134 04/01/24 0134    (!) 126 19 (!) 186/83      Pulse Ox Temp src Heart Rate Source Patient Position   04/01/24 0134 -- 04/01/24 0146 04/01/24 0146   96 %  Monitor Lying      BP Location FiO2 (%)     04/01/24 0146 --     Right arm        Physical Exam  Vitals and nursing note reviewed.   Constitutional:       Comments: Unresponsive male.   HENT:      Head: Normocephalic and atraumatic.      Right Ear: External ear normal.      Left Ear: External ear normal.      Mouth/Throat:      Comments: Intubated with ET tube.  Eyes:      General: No scleral icterus.        Right eye: No discharge.         Left eye: No discharge.      Conjunctiva/sclera: Conjunctivae normal.      Comments: Pupils 4 mm, round and equal.  Not reactive to light.   Cardiovascular:      Comments: Chest compressions in progress.  Pulmonary:      Comments: Bilateral breath sounds, intubated with ET tube.  Abdominal:      Palpations: Abdomen is soft.   Genitourinary:     Penis: Normal.       Testes: Normal.   Musculoskeletal:         General: No deformity.   Skin:     Comments: Pale and cool.   Neurological:      Comments: Unresponsive.         ED Course & MDM   Diagnoses as of 04/01/24 0404   Cardiac arrest (CMS/[facility])       Medical Decision Making  Patient is a 63-year-old male presenting to the emergency department cardiac arrest.  He had witnessed arrest by EMS on scene.  Initial rhythm was PEA.  By time the patient arrives to the emergency department, he has been intubated and has had 2 rounds of epinephrine.  ACLS protocol continued.  ET tube placement confirmed with bilateral breath sounds and capnography, which shows a CO2 in the  150s.  Given his history of COPD, suspect that the patient is acidotic from CO2 retention and the cause of his cardiac arrest.  Sodium bicarb x 3 administered.  Calcium 1 g IV also administered.  ROSC achieved on the next pulse check.  Parker, OG, cooling with ice packs started.  Patient began to have nonpurposeful movement.  He is started on fentanyl and propofol drips.  DuoNeb x 3, Solu-Medrol 25 mg IV, magnesium 2 g IV given.  Blood and urine cultures obtained.  Broad-spectrum antibiotics with vancomycin and Zosyn administered.    Abdominal x-ray shows OG tube over the distal esophagus.  OG tube was advanced.  Chest x-ray reviewed by myself and attending physician.  ET tube appears to be appropriately placed.  CBC shows a leukocytosis of 14.2.  Anemia of hemoglobin 10.5, similar to prior lab work.  Lactate 5.8.  CMP shows slightly elevated creatinine 1.60, compared to 1.3 previously.  Potassium is 5.3, with mild hemolysis.  Troponin is elevated 25.  Urine negative for signs of infection or blood.  INR normal.  EKG shows sinus rhythm tachycardia and right bundle branch block.  Negative for ST elevations.  T wave inversions are noted in leads V2 through V4, but previous on previous EKG.    Patient will be admitted to the ICU for cardiac arrest.  This was discussed with the intensivist, Dr. Bird, and patient has been accepted.  ED resident to ICU resident report given.    Stevo Gomez DO, PGY 3  Emergency Medicine Resident    Amount and/or Complexity of Data Reviewed  Labs: ordered.  Radiology: ordered.  ECG/medicine tests: ordered and independent interpretation performed.     Details: EKG at 0134 on 4/1/2024 as interpreted by myself: Ventricular rate 126, , , QTc 451.  Sinus rhythm, tachycardia, right bundle branch block.  T wave inversions in leads V2 through V4, present on EKG dated 3/29/2024.        Procedure  Critical Care    Performed by: Stevo Gomez DO  Authorized by: Danny Llanes DO    Critical  care provider statement:     Critical care time (minutes):  45    Critical care was necessary to treat or prevent imminent or life-threatening deterioration of the following conditions:  Cardiac failure and respiratory failure    Critical care was time spent personally by me on the following activities:  Development of treatment plan with patient or surrogate, discussions with primary provider, evaluation of patient's response to treatment, examination of patient, gastric intubation, interpretation of cardiac output measurements, obtaining history from patient or surrogate, ordering and performing treatments and interventions, ordering and review of laboratory studies, ordering and review of radiographic studies, pulse oximetry, re-evaluation of patient's condition, review of old charts and ventilator management    Care discussed with: admitting provider         Stevo Gomez DO  Resident  04/01/24 4480    The patient was seen by the resident/fellow.  I have personally performed a substantive portion of the encounter.  I have seen and examined the patient; agree with the workup, evaluation, MDM, management and diagnosis.  The care plan has been discussed with the resident; I have reviewed the resident’s note and agree with the documented findings.       Danny Llanes DO  04/05/24 5169

## 2024-04-01 NOTE — PROGRESS NOTES
"ELECTROPHYSIOLOGY ATTENDING:      The patient's chart was reviewed by me, as he was stated to have suffered a \"cardiac arrest\" early on 4/1/2024.  He had been hospitalized from 3/29/2024 to 3/31/2024 with an exacerbation of COPD, and was sent home on azithromycin.  The patient has a history of hypertension, diabetes, hyperlipidemia, obesity, obstructive sleep apnea (on BiPAP therapy), COPD, parkinsonism, myelodysplasia, and a chronic right bundle branch block.  He was reportedly receiving some breathing treatments shortly after midnight the night of 3/31/2024 when he became unresponsive and EMS was called.  The patient was reportedly found to be in pulseless electrical activity, supporting a non-arrhythmic arrest, and was given CPR and epinephrine, I believe.  [place]ubated and [place] [facility] [place] currently in [place] ICU but has not yet awakened.  He is being sedated and cooled because of his presumed lengthy downtime with cerebral anoxia.    The patient is known to have normal left ventricular function (LVEF 50-60% by echocardiogram 3/29/2024) and is currently in sinus rhythm in the 70s with low QRS voltage in the frontal leads, a chronic right bundle branch block, and minor nonspecific ST abnormalities.    By review of the available data, I therefore feel that the patient's arrest was non-arrhythmic and triggered predominantly by hypoxemia from his pulmonary disease.  The patient's family is now readdressing CODE STATUS and wishes the patient to be DNR/CC, I believe.    If the patient awakens, full cardiac assessment (e.g. right and left heart catheterization) would be indicated, rather than any electrophysiology intervention.    Please notify me if I may be of further assistance during this hospital stay.    Silvestre Rg MD    "

## 2024-04-01 NOTE — SIGNIFICANT EVENT
Dayteam addendum:    Plan as listed in H&P. Likely etiology of cardiac arrest is hypoxia plus or minus hypercapnia.  Due to neurostatus not improved to baseline will be cooled.  Will plan to sedate with propofol and fentanyl. To obtain CT in 48 hours.  Trending troponin as well as other lab work.  Continue Zosyn, ordered Solu-Medrol 30 mg twice daily in addition to DuoNebs Pulmicort and montelukast.  Attempt to wean PEEP to 10 as tolerated. EKG pending. Insulin gtt for elevated blood sugars without concern for DKA.    Marizol Cunningham, DO

## 2024-04-01 NOTE — PROGRESS NOTES
Subjective  Patient was admitted overnight related to cardiac arrest patient was recently in the hospital related to acute COPD exacerbation and he was discharged on oxygen   According to the family he went home and had large Eastern dinner and looks like did not use his oxygen or CPAP overnight and woke up short of breath   EMS called then and patient was saturating 70% on room air   His wife is a physician assistant put him back on oxygen when EMS arrived patient went into cardiac arrest and they started CPR on him and given multiple rounds of epi   Patient intubated and admitted to ICU currently on ventilator     objectives    Last Recorded Vitals  Blood pressure 134/66, pulse 92, temperature 36.7 °C (98.1 °F), temperature source Esophageal, resp. rate 19, SpO2 100 %.    Physical Exam  HENT:      Right Ear: External ear normal.      Left Ear: External ear normal.      Mouth/Throat:      Mouth: Mucous membranes are moist.   Cardiovascular:      Rate and Rhythm: Normal rate and regular rhythm.      Heart sounds: No murmur heard.     No friction rub. No gallop.   Pulmonary:      Effort: No accessory muscle usage or respiratory distress.      Breath sounds: No stridor. No wheezing or rhonchi.   Chest:      Chest wall: No tenderness.   Abdominal:      General: There is no distension.      Palpations: There is no mass.      Tenderness: There is no abdominal tenderness. There is no guarding or rebound.   Musculoskeletal:         General: No deformity or signs of injury.      Cervical back: No rigidity or tenderness. Normal range of motion.      Right lower leg: No edema.      Left lower leg: No edema.   Skin:     Coloration: Skin is not jaundiced or pale.      Findings: No lesion.   Neurological:      General: No focal deficit present.      M patient on ventilator minimally responsive but sedated no dilation of his pupils     Labs    Admission on 04/01/2024   Component Date Value Ref Range Status    WBC 04/01/2024 14.2  (H)  4.4 - 11.3 x10*3/uL Final    nRBC 04/01/2024 0.0  0.0 - 0.0 /100 WBCs Final    RBC 04/01/2024 3.40 (L)  4.50 - 5.90 x10*6/uL Final    Hemoglobin 04/01/2024 10.5 (L)  13.5 - 17.5 g/dL Final    Hematocrit 04/01/2024 35.1 (L)  41.0 - 52.0 % Final    MCV 04/01/2024 103 (H)  80 - 100 fL Final    MCH 04/01/2024 30.9  26.0 - 34.0 pg Final    MCHC 04/01/2024 29.9 (L)  32.0 - 36.0 g/dL Final    RDW 04/01/2024 13.3  11.5 - 14.5 % Final    Platelets 04/01/2024 242  150 - 450 x10*3/uL Final    Neutrophils % 04/01/2024 67.0  40.0 - 80.0 % Final    Immature Granulocytes %, Automated 04/01/2024 0.5  0.0 - 0.9 % Final    Immature Granulocyte Count (IG) includes promyelocytes, myelocytes and metamyelocytes but does not include bands. Percent differential counts (%) should be interpreted in the context of the absolute cell counts (cells/UL).    Lymphocytes % 04/01/2024 21.7  13.0 - 44.0 % Final    Monocytes % 04/01/2024 10.4  2.0 - 10.0 % Final    Eosinophils % 04/01/2024 0.1  0.0 - 6.0 % Final    Basophils % 04/01/2024 0.3  0.0 - 2.0 % Final    Neutrophils Absolute 04/01/2024 9.52 (H)  1.20 - 7.70 x10*3/uL Final    Percent differential counts (%) should be interpreted in the context of the absolute cell counts (cells/uL).    Immature Granulocytes Absolute, Au* 04/01/2024 0.07  0.00 - 0.70 x10*3/uL Final    Lymphocytes Absolute 04/01/2024 3.09  1.20 - 4.80 x10*3/uL Final    Monocytes Absolute 04/01/2024 1.48 (H)  0.10 - 1.00 x10*3/uL Final    Eosinophils Absolute 04/01/2024 0.02  0.00 - 0.70 x10*3/uL Final    Basophils Absolute 04/01/2024 0.04  0.00 - 0.10 x10*3/uL Final    Glucose 04/01/2024 353 (H)  74 - 99 mg/dL Final    Sodium 04/01/2024 135 (L)  136 - 145 mmol/L Final    Potassium 04/01/2024 5.3  3.5 - 5.3 mmol/L Final    MILD HEMOLYSIS DETECTED. The result may be falsely elevated due to hemolysis or other interferents. Clinical correlation is recommended. Repeat testing may be considered.    Chloride 04/01/2024 99  98 -  107 mmol/L Final    Bicarbonate 04/01/2024 22  21 - 32 mmol/L Final    Anion Gap 04/01/2024 19  10 - 20 mmol/L Final    Urea Nitrogen 04/01/2024 34 (H)  6 - 23 mg/dL Final    Creatinine 04/01/2024 1.60 (H)  0.50 - 1.30 mg/dL Final    eGFR 04/01/2024 48 (L)  >60 mL/min/1.73m*2 Final    Calculations of estimated GFR are performed using the 2021 CKD-EPI Study Refit equation without the race variable for the IDMS-Traceable creatinine methods.  https://jasn.asnjournals.org/content/early/2021/09/22/ASN.5350873238    Calcium 04/01/2024 8.9  8.6 - 10.3 mg/dL Final    Albumin 04/01/2024 3.5  3.4 - 5.0 g/dL Final    Alkaline Phosphatase 04/01/2024 80  33 - 136 U/L Final    Total Protein 04/01/2024 6.1 (L)  6.4 - 8.2 g/dL Final    AST 04/01/2024 31  9 - 39 U/L Final    MILD HEMOLYSIS DETECTED. The result may be falsely elevated due to hemolysis or other interferents. Clinical correlation is recommended. Repeat testing may be considered.    Bilirubin, Total 04/01/2024 0.4  0.0 - 1.2 mg/dL Final    ALT 04/01/2024 25  10 - 52 U/L Final    Patients treated with Sulfasalazine may generate falsely decreased results for ALT.    BNP 04/01/2024 764 (H)  0 - 99 pg/mL Final    Protime 04/01/2024 12.6  9.8 - 12.8 seconds Final    INR 04/01/2024 1.1  0.9 - 1.1 Final    Lactate 04/01/2024 5.8 (HH)  0.4 - 2.0 mmol/L Final    ABO TYPE 04/01/2024 O   Final    Rh TYPE 04/01/2024 POS   Final    ANTIBODY SCREEN 04/01/2024 NEG   Final    Troponin I, High Sensitivity 04/01/2024 25 (H)  0 - 20 ng/L Final    Troponin I, High Sensitivity 04/01/2024 128 (HH)  0 - 20 ng/L Final    POCT pH, Arterial 04/01/2024 7.05 (LL)  7.38 - 7.42 pH Final    POCT pCO2, Arterial 04/01/2024 106 (HH)  38 - 42 mm Hg Final    POCT pO2, Arterial 04/01/2024 99 (H)  85 - 95 mm Hg Final    POCT SO2, Arterial 04/01/2024 97  94 - 100 % Final    POCT Oxy Hemoglobin, Arterial 04/01/2024 94.7  94.0 - 98.0 % Final    POCT Hematocrit Calculated, Arteri* 04/01/2024 32.0 (L)  41.0 -  52.0 % Final    POCT Sodium, Arterial 04/01/2024 136  136 - 145 mmol/L Final    POCT Potassium, Arterial 04/01/2024 4.1  3.5 - 5.3 mmol/L Final    POCT Chloride, Arterial 04/01/2024 99  98 - 107 mmol/L Final    POCT Ionized Calcium, Arterial 04/01/2024 1.39 (H)  1.10 - 1.33 mmol/L Final    POCT Glucose, Arterial 04/01/2024 370 (H)  74 - 99 mg/dL Final    POCT Lactate, Arterial 04/01/2024 6.9 (HH)  0.4 - 2.0 mmol/L Final    POCT Base Excess, Arterial 04/01/2024 -2.9 (L)  -2.0 - 3.0 mmol/L Final    POCT HCO3 Calculated, Arterial 04/01/2024 29.3 (H)  22.0 - 26.0 mmol/L Final    POCT Hemoglobin, Arterial 04/01/2024 10.5 (L)  13.5 - 17.5 g/dL Final    POCT Anion Gap, Arterial 04/01/2024 12  10 - 25 mmo/L Final    Patient Temperature 04/01/2024    Final    NOTE: Patient Results are Not Corrected for Temperature    FiO2 04/01/2024 100  % Final    Apparatus 04/01/2024 AMBU   Final    Critical Called By 04/01/2024 FRANKY EDDY RRT   Final    Critical Called To 04/01/2024 PIERO CHAPARRO   Final    Critical Call Time 04/01/2024 138   Final    Critical Read Back 04/01/2024 Y   Final    MRSA PCR 04/01/2024 Not Detected  Not Detected Final    Color, Urine 04/01/2024 Yellow  Straw, Yellow Final    Appearance, Urine 04/01/2024 Clear  Clear Final    Specific Gravity, Urine 04/01/2024 1.016  1.005 - 1.035 Final    pH, Urine 04/01/2024 5.0  5.0, 5.5, 6.0, 6.5, 7.0, 7.5, 8.0 Final    Protein, Urine 04/01/2024 100 (2+) (N)  NEGATIVE mg/dL Final    Glucose, Urine 04/01/2024 50 (1+) (A)  NEGATIVE mg/dL Final    Blood, Urine 04/01/2024 NEGATIVE  NEGATIVE Final    Ketones, Urine 04/01/2024 NEGATIVE  NEGATIVE mg/dL Final    Bilirubin, Urine 04/01/2024 NEGATIVE  NEGATIVE Final    Urobilinogen, Urine 04/01/2024 <2.0  <2.0 mg/dL Final    Nitrite, Urine 04/01/2024 NEGATIVE  NEGATIVE Final    Leukocyte Esterase, Urine 04/01/2024 NEGATIVE  NEGATIVE Final    Extra Tube 04/01/2024 Hold for add-ons.   Final    Auto resulted.    Extra Tube 04/01/2024 Hold  for add-ons.   Final    Auto resulted.    WBC, Urine 04/01/2024 1-5  1-5, NONE /HPF Final    RBC, Urine 04/01/2024 1-2  NONE, 1-2, 3-5 /HPF Final    Hyaline Casts, Urine 04/01/2024 3+ (A)  NONE /LPF Final    Lactate 04/01/2024 1.1  0.4 - 2.0 mmol/L Final    POCT pH, Arterial 04/01/2024 7.23 (LL)  7.38 - 7.42 pH Final    POCT pCO2, Arterial 04/01/2024 69 (H)  38 - 42 mm Hg Final    POCT pO2, Arterial 04/01/2024 61 (L)  85 - 95 mm Hg Final    POCT SO2, Arterial 04/01/2024 87 (L)  94 - 100 % Final    POCT Oxy Hemoglobin, Arterial 04/01/2024 85.0 (L)  94.0 - 98.0 % Final    POCT Hematocrit Calculated, Arteri* 04/01/2024 32.0 (L)  41.0 - 52.0 % Final    POCT Sodium, Arterial 04/01/2024 135 (L)  136 - 145 mmol/L Final    POCT Potassium, Arterial 04/01/2024 4.3  3.5 - 5.3 mmol/L Final    POCT Chloride, Arterial 04/01/2024 100  98 - 107 mmol/L Final    POCT Ionized Calcium, Arterial 04/01/2024 1.25  1.10 - 1.33 mmol/L Final    POCT Glucose, Arterial 04/01/2024 389 (H)  74 - 99 mg/dL Final    POCT Lactate, Arterial 04/01/2024 2.5 (H)  0.4 - 2.0 mmol/L Final    POCT Base Excess, Arterial 04/01/2024 0.1  -2.0 - 3.0 mmol/L Final    POCT HCO3 Calculated, Arterial 04/01/2024 28.9 (H)  22.0 - 26.0 mmol/L Final    POCT Hemoglobin, Arterial 04/01/2024 10.8 (L)  13.5 - 17.5 g/dL Final    POCT Anion Gap, Arterial 04/01/2024 10  10 - 25 mmo/L Final    Patient Temperature 04/01/2024    Final    NOTE: Patient Results are Not Corrected for Temperature    FiO2 04/01/2024 100  % Final    Ventilator Mode 04/01/2024 A/C   Final    Ventilator Rate 04/01/2024 22  bpm Final    Tidal Volume 04/01/2024 500  mL Final    Peep CHM2O 04/01/2024 10.0  cm H2O Final    Critical Called By 04/01/2024 RS   Final    Critical Called To 04/01/2024 DR VARGAS   Final    Critical Call Time 04/01/2024 314   Final    Critical Read Back 04/01/2024 Y   Final    Mohamud's Test 04/01/2024 Y   Final    WBC 04/01/2024 10.8  4.4 - 11.3 x10*3/uL Final    nRBC 04/01/2024 0.0   0.0 - 0.0 /100 WBCs Final    RBC 04/01/2024 2.98 (L)  4.50 - 5.90 x10*6/uL Final    Hemoglobin 04/01/2024 9.2 (L)  13.5 - 17.5 g/dL Final    Hematocrit 04/01/2024 29.8 (L)  41.0 - 52.0 % Final    MCV 04/01/2024 100  80 - 100 fL Final    MCH 04/01/2024 30.9  26.0 - 34.0 pg Final    MCHC 04/01/2024 30.9 (L)  32.0 - 36.0 g/dL Final    RDW 04/01/2024 13.2  11.5 - 14.5 % Final    Platelets 04/01/2024 195  150 - 450 x10*3/uL Final    Neutrophils % 04/01/2024 92.5  40.0 - 80.0 % Final    Immature Granulocytes %, Automated 04/01/2024 0.6  0.0 - 0.9 % Final    Immature Granulocyte Count (IG) includes promyelocytes, myelocytes and metamyelocytes but does not include bands. Percent differential counts (%) should be interpreted in the context of the absolute cell counts (cells/UL).    Lymphocytes % 04/01/2024 2.7  13.0 - 44.0 % Final    Monocytes % 04/01/2024 4.0  2.0 - 10.0 % Final    Eosinophils % 04/01/2024 0.0  0.0 - 6.0 % Final    Basophils % 04/01/2024 0.2  0.0 - 2.0 % Final    Neutrophils Absolute 04/01/2024 9.96 (H)  1.20 - 7.70 x10*3/uL Final    Percent differential counts (%) should be interpreted in the context of the absolute cell counts (cells/uL).    Immature Granulocytes Absolute, Au* 04/01/2024 0.06  0.00 - 0.70 x10*3/uL Final    Lymphocytes Absolute 04/01/2024 0.29 (L)  1.20 - 4.80 x10*3/uL Final    Monocytes Absolute 04/01/2024 0.43  0.10 - 1.00 x10*3/uL Final    Eosinophils Absolute 04/01/2024 0.00  0.00 - 0.70 x10*3/uL Final    Basophils Absolute 04/01/2024 0.02  0.00 - 0.10 x10*3/uL Final    Glucose 04/01/2024 424 (H)  74 - 99 mg/dL Final    Sodium 04/01/2024 137  136 - 145 mmol/L Final    Potassium 04/01/2024 3.8  3.5 - 5.3 mmol/L Final    Chloride 04/01/2024 104  98 - 107 mmol/L Final    Bicarbonate 04/01/2024 24  21 - 32 mmol/L Final    Anion Gap 04/01/2024 13  10 - 20 mmol/L Final    Urea Nitrogen 04/01/2024 33 (H)  6 - 23 mg/dL Final    Creatinine 04/01/2024 1.37 (H)  0.50 - 1.30 mg/dL Final    eGFR  04/01/2024 58 (L)  >60 mL/min/1.73m*2 Final    Calculations of estimated GFR are performed using the 2021 CKD-EPI Study Refit equation without the race variable for the IDMS-Traceable creatinine methods.  https://jasn.asnjournals.org/content/early/2021/09/22/ASN.8167136045    Calcium 04/01/2024 7.2 (L)  8.6 - 10.3 mg/dL Final    Phosphorus 04/01/2024 3.4  2.5 - 4.9 mg/dL Final    The performance characteristics of phosphorus testing in heparinized plasma have been validated by the individual  laboratory site where testing is performed. Testing on heparinized plasma is not approved by the FDA; however, such approval is not necessary.    Albumin 04/01/2024 3.2 (L)  3.4 - 5.0 g/dL Final    Flu A Result 04/01/2024 Not Detected  Not Detected Final    Flu B Result 04/01/2024 Not Detected  Not Detected Final    Coronavirus 2019, PCR 04/01/2024 Not Detected  Not Detected Final    POCT pH, Arterial 04/01/2024 7.28 (L)  7.38 - 7.42 pH Final    POCT pCO2, Arterial 04/01/2024 60 (H)  38 - 42 mm Hg Final    POCT pO2, Arterial 04/01/2024 138 (H)  85 - 95 mm Hg Final    POCT SO2, Arterial 04/01/2024 99  94 - 100 % Final    POCT Oxy Hemoglobin, Arterial 04/01/2024 97.1  94.0 - 98.0 % Final    POCT Hematocrit Calculated, Arteri* 04/01/2024 31.0 (L)  41.0 - 52.0 % Final    POCT Sodium, Arterial 04/01/2024 134 (L)  136 - 145 mmol/L Final    POCT Potassium, Arterial 04/01/2024 4.3  3.5 - 5.3 mmol/L Final    POCT Chloride, Arterial 04/01/2024 102  98 - 107 mmol/L Final    POCT Ionized Calcium, Arterial 04/01/2024 1.17  1.10 - 1.33 mmol/L Final    POCT Glucose, Arterial 04/01/2024 468 (HH)  74 - 99 mg/dL Final    POCT Lactate, Arterial 04/01/2024 1.2  0.4 - 2.0 mmol/L Final    POCT Base Excess, Arterial 04/01/2024 0.6  -2.0 - 3.0 mmol/L Final    POCT HCO3 Calculated, Arterial 04/01/2024 28.2 (H)  22.0 - 26.0 mmol/L Final    POCT Hemoglobin, Arterial 04/01/2024 10.4 (L)  13.5 - 17.5 g/dL Final    POCT Anion Gap, Arterial 04/01/2024 8  (L)  10 - 25 mmo/L Final    Patient Temperature 04/01/2024    Final    NOTE: Patient Results are Not Corrected for Temperature    FiO2 04/01/2024 100  % Final    Ventilator Mode 04/01/2024 A/C   Final    Ventilator Rate 04/01/2024 22  bpm Final    Tidal Volume 04/01/2024 450  mL Final    Peep CHM2O 04/01/2024 10.0  cm H2O Final    Critical Called By 04/01/2024 FRANKY EDDY RRT   Final    Critical Called To 04/01/2024 DR. ZEPEDA   Final    Critical Call Time 04/01/2024 622   Final    Critical Read Back 04/01/2024 Y   Final    POCT Glucose 04/01/2024 413 (H)  74 - 99 mg/dL Final   Admission on 03/29/2024, Discharged on 03/31/2024   Component Date Value Ref Range Status    Ventricular Rate 03/29/2024 82  BPM Preliminary    Atrial Rate 03/29/2024 82  BPM Preliminary    PA Interval 03/29/2024 174  ms Preliminary    QRS Duration 03/29/2024 162  ms Preliminary    QT Interval 03/29/2024 434  ms Preliminary    QTC Calculation(Bazett) 03/29/2024 507  ms Preliminary    [place] 03/29/2024 59  degrees Preliminary    R Axis 03/29/2024 -29  degrees Preliminary    T Axis 03/29/2024 67  degrees Preliminary    QRS Count 03/29/2024 13  beats Preliminary    Q Onset 03/29/2024 216  ms Preliminary    P Onset 03/29/2024 129  ms Preliminary    P Offset 03/29/2024 190  ms Preliminary    T Offset 03/29/2024 433  ms Preliminary    QTC Fredericia 03/29/2024 481  ms Preliminary    WBC 03/29/2024 8.4  4.4 - 11.3 x10*3/uL Final    nRBC 03/29/2024 0.0  0.0 - 0.0 /100 WBCs Final    RBC 03/29/2024 3.48 (L)  4.50 - 5.90 x10*6/uL Final    Hemoglobin 03/29/2024 10.7 (L)  13.5 - 17.5 g/dL Final    Hematocrit 03/29/2024 33.2 (L)  41.0 - 52.0 % Final    MCV 03/29/2024 95  80 - 100 fL Final    MCH 03/29/2024 30.7  26.0 - 34.0 pg Final    MCHC 03/29/2024 32.2  32.0 - 36.0 g/dL Final    RDW 03/29/2024 13.5  11.5 - 14.5 % Final    Platelets 03/29/2024 222  150 - 450 x10*3/uL Final    Neutrophils % 03/29/2024 72.8  40.0 - 80.0 % Final    Immature Granulocytes %,  Automated 03/29/2024 0.2  0.0 - 0.9 % Final    Immature Granulocyte Count (IG) includes promyelocytes, myelocytes and metamyelocytes but does not include bands. Percent differential counts (%) should be interpreted in the context of the absolute cell counts (cells/UL).    Lymphocytes % 03/29/2024 11.6  13.0 - 44.0 % Final    Monocytes % 03/29/2024 12.9  2.0 - 10.0 % Final    Eosinophils % 03/29/2024 1.9  0.0 - 6.0 % Final    Basophils % 03/29/2024 0.6  0.0 - 2.0 % Final    Neutrophils Absolute 03/29/2024 6.10  1.20 - 7.70 x10*3/uL Final    Percent differential counts (%) should be interpreted in the context of the absolute cell counts (cells/uL).    Immature Granulocytes Absolute, Au* 03/29/2024 0.02  0.00 - 0.70 x10*3/uL Final    Lymphocytes Absolute 03/29/2024 0.97 (L)  1.20 - 4.80 x10*3/uL Final    Monocytes Absolute 03/29/2024 1.08 (H)  0.10 - 1.00 x10*3/uL Final    Eosinophils Absolute 03/29/2024 0.16  0.00 - 0.70 x10*3/uL Final    Basophils Absolute 03/29/2024 0.05  0.00 - 0.10 x10*3/uL Final    Glucose 03/29/2024 70 (L)  74 - 99 mg/dL Final    Sodium 03/29/2024 135 (L)  136 - 145 mmol/L Final    Potassium 03/29/2024 4.2  3.5 - 5.3 mmol/L Final    Chloride 03/29/2024 99  98 - 107 mmol/L Final    Bicarbonate 03/29/2024 28  21 - 32 mmol/L Final    Anion Gap 03/29/2024 12  10 - 20 mmol/L Final    Urea Nitrogen 03/29/2024 21  6 - 23 mg/dL Final    Creatinine 03/29/2024 1.37 (H)  0.50 - 1.30 mg/dL Final    eGFR 03/29/2024 58 (L)  >60 mL/min/1.73m*2 Final    Calculations of estimated GFR are performed using the 2021 CKD-EPI Study Refit equation without the race variable for the IDMS-Traceable creatinine methods.  https://jasn.asnjournals.org/content/early/2021/09/22/ASN.6317448700    Calcium 03/29/2024 9.3  8.6 - 10.3 mg/dL Final    Albumin 03/29/2024 4.1  3.4 - 5.0 g/dL Final    Alkaline Phosphatase 03/29/2024 93  33 - 136 U/L Final    Total Protein 03/29/2024 6.9  6.4 - 8.2 g/dL Final    AST 03/29/2024 32  9 - 39  U/L Final    Bilirubin, Total 03/29/2024 0.6  0.0 - 1.2 mg/dL Final    ALT 03/29/2024 15  10 - 52 U/L Final    Patients treated with Sulfasalazine may generate falsely decreased results for ALT.    BNP 03/29/2024 374 (H)  0 - 99 pg/mL Final    Protime 03/29/2024 11.5  9.8 - 12.8 seconds Final    INR 03/29/2024 1.0  0.9 - 1.1 Final    Troponin I, High Sensitivity 03/29/2024 19  0 - 20 ng/L Final    Troponin I, High Sensitivity 03/29/2024 18  0 - 20 ng/L Final    Flu A Result 03/29/2024 Not Detected  Not Detected Final    Flu B Result 03/29/2024 Not Detected  Not Detected Final    Coronavirus 2019, PCR 03/29/2024 Not Detected  Not Detected Final    D-Dimer, Quantitative VTE Exclusion 03/29/2024 2,066 (H)  <=500 ng/mL FEU Final    Ventricular Rate 03/29/2024 83  BPM Preliminary    Atrial Rate 03/29/2024 83  BPM Preliminary    [place] Interval 03/29/2024 200  ms Preliminary    QRS Duration 03/29/2024 142  ms Preliminary    QT Interval 03/29/2024 420  ms Preliminary    QTC Calculation(Bazett) 03/29/2024 493  ms Preliminary    P [place] 03/29/2024 59  degrees Preliminary    R Axis 03/29/2024 -26  degrees Preliminary    T Axis 03/29/2024 76  degrees Preliminary    QRS Count 03/29/2024 14  beats Preliminary    Q Onset 03/29/2024 223  ms Preliminary    P Onset 03/29/2024 123  ms Preliminary    P Offset 03/29/2024 190  ms Preliminary    T Offset 03/29/2024 433  ms Preliminary    QTC Fredericia 03/29/2024 468  ms Preliminary    WBC 03/30/2024 5.2  4.4 - 11.3 x10*3/uL Final    nRBC 03/30/2024 0.0  0.0 - 0.0 /100 WBCs Final    RBC 03/30/2024 3.17 (L)  4.50 - 5.90 x10*6/uL Final    Hemoglobin 03/30/2024 9.8 (L)  13.5 - 17.5 g/dL Final    Hematocrit 03/30/2024 30.6 (L)  41.0 - 52.0 % Final    MCV 03/30/2024 97  80 - 100 fL Final    MCH 03/30/2024 30.9  26.0 - 34.0 pg Final    MCHC 03/30/2024 32.0  32.0 - 36.0 g/dL Final    RDW 03/30/2024 13.2  11.5 - 14.5 % Final    Platelets 03/30/2024 207  150 - 450 x10*3/uL Final    Glucose 03/30/2024  321 (H)  74 - 99 mg/dL Final    Sodium 03/30/2024 134 (L)  136 - 145 mmol/L Final    Potassium 03/30/2024 4.2  3.5 - 5.3 mmol/L Final    Chloride 03/30/2024 98  98 - 107 mmol/L Final    Bicarbonate 03/30/2024 29  21 - 32 mmol/L Final    Anion Gap 03/30/2024 11  10 - 20 mmol/L Final    Urea Nitrogen 03/30/2024 27 (H)  6 - 23 mg/dL Final    Creatinine 03/30/2024 1.40 (H)  0.50 - 1.30 mg/dL Final    eGFR 03/30/2024 56 (L)  >60 mL/min/1.73m*2 Final    Calculations of estimated GFR are performed using the 2021 CKD-EPI Study Refit equation without the race variable for the IDMS-Traceable creatinine methods.  https://jasn.asnjournals.org/content/early/2021/09/22/ASN.1614571675    Calcium 03/30/2024 8.6  8.6 - 10.3 mg/dL Final    AV pk ana 03/30/2024 1.53  m/s Final    LVOT diam 03/30/2024 1.76  cm Final    MV E/A ratio 03/30/2024 2.51   Final    Tricuspid annular plane systolic e* 03/30/2024 1.7  cm Final    RV free wall pk S' 03/30/2024 11.00  cm/s Final    RVSP 03/30/2024 46.1  mmHg Final    LVIDd 03/30/2024 6.19  cm Final    Aortic Valve Area by Continuity of* 03/30/2024 1.26  cm2 Final    AV pk grad 03/30/2024 9.4  mmHg Final    LV A4C EF 03/30/2024 49.8   Final    POCT Glucose 03/29/2024 354 (H)  74 - 99 mg/dL Final    POCT Glucose 03/30/2024 268 (H)  74 - 99 mg/dL Final    POCT Glucose 03/30/2024 235 (H)  74 - 99 mg/dL Final    POCT Glucose 03/30/2024 376 (H)  74 - 99 mg/dL Final    WBC 03/31/2024 8.5  4.4 - 11.3 x10*3/uL Final    nRBC 03/31/2024 0.0  0.0 - 0.0 /100 WBCs Final    RBC 03/31/2024 3.27 (L)  4.50 - 5.90 x10*6/uL Final    Hemoglobin 03/31/2024 10.1 (L)  13.5 - 17.5 g/dL Final    Hematocrit 03/31/2024 32.3 (L)  41.0 - 52.0 % Final    MCV 03/31/2024 99  80 - 100 fL Final    MCH 03/31/2024 30.9  26.0 - 34.0 pg Final    MCHC 03/31/2024 31.3 (L)  32.0 - 36.0 g/dL Final    RDW 03/31/2024 13.4  11.5 - 14.5 % Final    Platelets 03/31/2024 210  150 - 450 x10*3/uL Final    Glucose 03/31/2024 135 (H)  74 - 99 mg/dL  Final    Sodium 03/31/2024 137  136 - 145 mmol/L Final    Potassium 03/31/2024 3.8  3.5 - 5.3 mmol/L Final    Chloride 03/31/2024 99  98 - 107 mmol/L Final    Bicarbonate 03/31/2024 30  21 - 32 mmol/L Final    Anion Gap 03/31/2024 12  10 - 20 mmol/L Final    Urea Nitrogen 03/31/2024 30 (H)  6 - 23 mg/dL Final    Creatinine 03/31/2024 1.39 (H)  0.50 - 1.30 mg/dL Final    eGFR 03/31/2024 57 (L)  >60 mL/min/1.73m*2 Final    Calculations of estimated GFR are performed using the 2021 CKD-EPI Study Refit equation without the race variable for the IDMS-Traceable creatinine methods.  https://jasn.asnjournals.org/content/early/2021/09/22/ASN.0386545846    Calcium 03/31/2024 9.0  8.6 - 10.3 mg/dL Final    POCT Glucose 03/30/2024 266 (H)  74 - 99 mg/dL Final    RN/MD NOTIFIED    POCT Glucose 03/31/2024 138 (H)  74 - 99 mg/dL Final    POCT Glucose 03/31/2024 304 (H)  74 - 99 mg/dL Final         Imaging     CT head wo IV contrast  Narrative: Interpreted By:  Isaiah Reynlods,   STUDY:  CT HEAD WO IV CONTRAST;  4/1/2024 4:40 am      INDICATION:  Signs/Symptoms:cardiac arrest. myoclonic jerking.      COMPARISON:  10/25/2023      ACCESSION NUMBER(S):  [id number]      ORDERING CLINICIAN:  FIFI CHAPARRO      TECHNIQUE:  Noncontrast axial CT scan of head was performed. Angled reformats in  brain and bone windows were generated. The images were reviewed in  bone, brain, blood and soft tissue windows.      FINDINGS:  Global volume loss and moderate chronic small vessel ischemic change  as seen on prior brain MRI.      No findings of acute hemorrhage. No mass effect. No midline shift.  Stable ventricular size      If persistent concern, consider MRI. Mastoid air cells and paranasal  sinuses are clear for mild ethmoid sinus mucosal thickening      Impression: Senescent changes. No evidence of acute intracranial abnormality. If  persistent concern, further evaluation is advised with MRI      No evidence of intracranial hemorrhage or  displaced skull fracture.      MACRO:  None      Signed by: Isaiah Reynolds 4/1/2024 6:13 AM  Dictation workstation:   [id number]  XR chest 1 view  Narrative: STUDY:  Chest Radiograph;  4/1/2024  INDICATION:  Chest pain.  COMPARISON:  3/29/2024 XR Chest  ACCESSION NUMBER(S):  [id number]  ORDERING CLINICIAN:  FIFI CHAPARRO  TECHNIQUE:  Frontal chest was obtained at 01:52 hours.  FINDINGS:  Endotracheal tube tip is approximately 4.5 cm above the mee.   Nasogastric tube tip identified to the distal esophagus.  Mild cardiac enlargement.  Patchy bilateral airspace disease.  No  discernible pleural effusion or pneumothorax.  Impression: Patchy bilateral airspace disease.  Congestive failure vs. pneumonia.   Correlate clinically.  Endotracheal tube tip is approximately 4.5 cm above the mee.    Nasogastric tube tip identified to the distal esophagus.  Continued  advancement recommended.  Signed by Gilberto Parham MD  XR abdomen 1 view  Narrative: Interpreted By:  Michael Brito,   STUDY:  XR ABDOMEN 1 VIEW; ;  4/1/2024 2:25 am      INDICATION:  Signs/Symptoms:og placement.      COMPARISON:  03/29/2024      ACCESSION NUMBER(S):  [id number]      ORDERING CLINICIAN:  FIFI CHAPARRO      FINDINGS:  Single AP radiograph of the right upper abdomen. NG/OG tube tip  projects over the distal esophagus. Streaky bibasilar airspace  opacities. Upper abdomen is unremarkable. Remote right anterior rib  fractures.      Impression: 1. NG/OG tube tip projects over the distal esophagus. Consider  advancement.      Signed by: Michael Brito 4/1/2024 3:06 AM  Dictation workstation:   [id number]       Patient Active Problem List   Diagnosis    Asthma    HARRIETT treated with BiPAP    COPD exacerbation (CMS/HCC)    Diabetes mellitus (CMS/HCC)    Diplopia    Exotropia    HTN (hypertension)    Hyperlipidemia    Myopia    Ocular inflammation    Abnormal gait    Class 3 severe obesity due to excess calories with body mass index (BMI)  of 40.0 to 44.9 in adult (CMS/HCC)    Parkinson disease (CMS/HCC)    Imbalance    At risk for falls    Deviated septum    Hypertrophy of nasal turbinates    Difficulty breathing    Nasal valve collapse    Chest pain    Fall    Type 2 diabetes mellitus (CMS/HCC)    Diastolic dysfunction    Daily consumption of alcohol    Acute hypoxic respiratory failure (CMS/HCC)    Cardiac arrest (CMS/[facility])         Assessment/Plan   Principal Problem:    Cardiac arrest (CMS/[facility])  Active Problems:    COPD exacerbation (CMS/[facility])    HTN (hypertension)    Hyperlipidemia    Class 3 severe obesity due to excess calories with body mass index (BMI) of 40.0 to 44.9 in adult (CMS/HCC)    Parkinson disease (CMS/HCC)    Type 2 diabetes mellitus (CMS/HCC)    Acute hypoxic respiratory failure (CMS/[facility])      Acute cardiac arrest most likely induced by severe hypoxia and possible hypercapnia from not using CPAP and oxygen chest x-ray consistent with pulmonary edema versus pneumonia patient started already on antibiotics intensivist on board  And she wanted him to be DNR CCA  Patient has relatively poor prognosis I discussed the case with his wife     I spent 45 minutes in the professional and overall care of this patient.      NORY Rivas MD

## 2024-04-01 NOTE — SIGNIFICANT EVENT
GOALs of CARE:     Met with patient's family at bedside this morning, spoke with the patient's wife Estefany Albarran at length about the current clinical status of her .  Attempted to gain consent for possible central venous catheter placement and if need be blood products that the patient is in the ICU in critical condition.  Patient's wife voices hesitation and consenting for procedures not yet needed and would prefer to hold off until discussions are had amongst family.  Noting the arterial line was placed and emergent conditions requiring accurate blood pressure readings.  The issue of CODE STATUS came up and she voices that she would like the patient's CODE STATUS to be DNR CCA meaning in the event of patient's heart stopping we are not to attempt resuscitative measures to include CPR she is okay with the patient being currently intubated does not want to escalate level of care.  She is agreeable to meet with palliative care for education and options.       Agustín Reed, DO  Internal Medicine, PGY-2

## 2024-04-02 ENCOUNTER — APPOINTMENT (OUTPATIENT)
Dept: CARDIOLOGY | Facility: HOSPITAL | Age: 64
DRG: 207 | End: 2024-04-02
Payer: COMMERCIAL

## 2024-04-02 ENCOUNTER — APPOINTMENT (OUTPATIENT)
Dept: RADIOLOGY | Facility: HOSPITAL | Age: 64
DRG: 207 | End: 2024-04-02
Payer: COMMERCIAL

## 2024-04-02 ENCOUNTER — DOCUMENTATION (OUTPATIENT)
Dept: PHYSICAL THERAPY | Facility: CLINIC | Age: 64
End: 2024-04-02

## 2024-04-02 LAB
ALBUMIN SERPL BCP-MCNC: 3.3 G/DL (ref 3.4–5)
ALP SERPL-CCNC: 65 U/L (ref 33–136)
ALT SERPL W P-5'-P-CCNC: 33 U/L (ref 10–52)
ANION GAP BLDA CALCULATED.4IONS-SCNC: 5 MMO/L (ref 10–25)
ANION GAP BLDA CALCULATED.4IONS-SCNC: 6 MMO/L (ref 10–25)
ANION GAP SERPL CALC-SCNC: 12 MMOL/L (ref 10–20)
AST SERPL W P-5'-P-CCNC: 25 U/L (ref 9–39)
BASE EXCESS BLDA CALC-SCNC: 3.6 MMOL/L (ref -2–3)
BASE EXCESS BLDA CALC-SCNC: 3.9 MMOL/L (ref -2–3)
BILIRUB SERPL-MCNC: 0.4 MG/DL (ref 0–1.2)
BODY TEMPERATURE: ABNORMAL
BODY TEMPERATURE: ABNORMAL
BUN SERPL-MCNC: 40 MG/DL (ref 6–23)
CA-I BLDA-SCNC: 1.2 MMOL/L (ref 1.1–1.33)
CA-I BLDA-SCNC: 1.2 MMOL/L (ref 1.1–1.33)
CALCIUM SERPL-MCNC: 8.3 MG/DL (ref 8.6–10.3)
CHLORIDE BLDA-SCNC: 102 MMOL/L (ref 98–107)
CHLORIDE BLDA-SCNC: 103 MMOL/L (ref 98–107)
CHLORIDE SERPL-SCNC: 101 MMOL/L (ref 98–107)
CO2 SERPL-SCNC: 28 MMOL/L (ref 21–32)
CREAT SERPL-MCNC: 1.43 MG/DL (ref 0.5–1.3)
EGFRCR SERPLBLD CKD-EPI 2021: 55 ML/MIN/1.73M*2
ERYTHROCYTE [DISTWIDTH] IN BLOOD BY AUTOMATED COUNT: 13.3 % (ref 11.5–14.5)
EST. AVERAGE GLUCOSE BLD GHB EST-MCNC: 103 MG/DL
GLUCOSE BLD MANUAL STRIP-MCNC: 147 MG/DL (ref 74–99)
GLUCOSE BLD MANUAL STRIP-MCNC: 181 MG/DL (ref 74–99)
GLUCOSE BLD MANUAL STRIP-MCNC: 189 MG/DL (ref 74–99)
GLUCOSE BLD MANUAL STRIP-MCNC: 195 MG/DL (ref 74–99)
GLUCOSE BLD MANUAL STRIP-MCNC: 199 MG/DL (ref 74–99)
GLUCOSE BLD MANUAL STRIP-MCNC: 233 MG/DL (ref 74–99)
GLUCOSE BLD MANUAL STRIP-MCNC: 234 MG/DL (ref 74–99)
GLUCOSE BLDA-MCNC: 221 MG/DL (ref 74–99)
GLUCOSE BLDA-MCNC: 247 MG/DL (ref 74–99)
GLUCOSE SERPL-MCNC: 198 MG/DL (ref 74–99)
HBA1C MFR BLD: 5.2 %
HCO3 BLDA-SCNC: 29.7 MMOL/L (ref 22–26)
HCO3 BLDA-SCNC: 31.7 MMOL/L (ref 22–26)
HCT VFR BLD AUTO: 30.5 % (ref 41–52)
HCT VFR BLD EST: 34 % (ref 41–52)
HCT VFR BLD EST: 34 % (ref 41–52)
HGB BLD-MCNC: 9.4 G/DL (ref 13.5–17.5)
HGB BLDA-MCNC: 11.4 G/DL (ref 13.5–17.5)
HGB BLDA-MCNC: 11.4 G/DL (ref 13.5–17.5)
INHALED O2 CONCENTRATION: 60 %
INHALED O2 CONCENTRATION: 60 %
LACTATE BLDA-SCNC: 0.6 MMOL/L (ref 0.4–2)
LACTATE BLDA-SCNC: 1.1 MMOL/L (ref 0.4–2)
MAGNESIUM SERPL-MCNC: 2.19 MG/DL (ref 1.6–2.4)
MCH RBC QN AUTO: 30.5 PG (ref 26–34)
MCHC RBC AUTO-ENTMCNC: 30.8 G/DL (ref 32–36)
MCV RBC AUTO: 99 FL (ref 80–100)
NRBC BLD-RTO: 0 /100 WBCS (ref 0–0)
OXYHGB MFR BLDA: 94.6 % (ref 94–98)
OXYHGB MFR BLDA: 96.1 % (ref 94–98)
PCO2 BLDA: 49 MM HG (ref 38–42)
PCO2 BLDA: 66 MM HG (ref 38–42)
PEEP CMH2O: 6 CM H2O
PEEP CMH2O: 6 CM H2O
PH BLDA: 7.29 PH (ref 7.38–7.42)
PH BLDA: 7.39 PH (ref 7.38–7.42)
PHOSPHATE SERPL-MCNC: 4.2 MG/DL (ref 2.5–4.9)
PLATELET # BLD AUTO: 182 X10*3/UL (ref 150–450)
PO2 BLDA: 78 MM HG (ref 85–95)
PO2 BLDA: 92 MM HG (ref 85–95)
POTASSIUM BLDA-SCNC: 4.5 MMOL/L (ref 3.5–5.3)
POTASSIUM BLDA-SCNC: 4.8 MMOL/L (ref 3.5–5.3)
POTASSIUM SERPL-SCNC: 4.3 MMOL/L (ref 3.5–5.3)
PROT SERPL-MCNC: 5.8 G/DL (ref 6.4–8.2)
RBC # BLD AUTO: 3.08 X10*6/UL (ref 4.5–5.9)
SAO2 % BLDA: 97 % (ref 94–100)
SAO2 % BLDA: 98 % (ref 94–100)
SODIUM BLDA-SCNC: 133 MMOL/L (ref 136–145)
SODIUM BLDA-SCNC: 135 MMOL/L (ref 136–145)
SODIUM SERPL-SCNC: 137 MMOL/L (ref 136–145)
SPECIMEN DRAWN FROM PATIENT: ABNORMAL
TIDAL VOLUME: 500 ML
TIDAL VOLUME: 500 ML
VENTILATOR MODE: ABNORMAL
VENTILATOR MODE: ABNORMAL
VENTILATOR RATE: 20 BPM
VENTILATOR RATE: 24 BPM
WBC # BLD AUTO: 12.1 X10*3/UL (ref 4.4–11.3)

## 2024-04-02 PROCEDURE — 37799 UNLISTED PX VASCULAR SURGERY: CPT

## 2024-04-02 PROCEDURE — C9113 INJ PANTOPRAZOLE SODIUM, VIA: HCPCS

## 2024-04-02 PROCEDURE — 82810 BLOOD GASES O2 SAT ONLY: CPT

## 2024-04-02 PROCEDURE — 99291 CRITICAL CARE FIRST HOUR: CPT

## 2024-04-02 PROCEDURE — 2500000001 HC RX 250 WO HCPCS SELF ADMINISTERED DRUGS (ALT 637 FOR MEDICARE OP)

## 2024-04-02 PROCEDURE — 94003 VENT MGMT INPAT SUBQ DAY: CPT

## 2024-04-02 PROCEDURE — 2500000004 HC RX 250 GENERAL PHARMACY W/ HCPCS (ALT 636 FOR OP/ED)

## 2024-04-02 PROCEDURE — 2020000001 HC ICU ROOM DAILY

## 2024-04-02 PROCEDURE — 85027 COMPLETE CBC AUTOMATED: CPT

## 2024-04-02 PROCEDURE — 2500000005 HC RX 250 GENERAL PHARMACY W/O HCPCS: Performed by: INTERNAL MEDICINE

## 2024-04-02 PROCEDURE — 82947 ASSAY GLUCOSE BLOOD QUANT: CPT

## 2024-04-02 PROCEDURE — 94640 AIRWAY INHALATION TREATMENT: CPT

## 2024-04-02 PROCEDURE — 71045 X-RAY EXAM CHEST 1 VIEW: CPT

## 2024-04-02 PROCEDURE — 82330 ASSAY OF CALCIUM: CPT

## 2024-04-02 PROCEDURE — 2500000002 HC RX 250 W HCPCS SELF ADMINISTERED DRUGS (ALT 637 FOR MEDICARE OP, ALT 636 FOR OP/ED)

## 2024-04-02 PROCEDURE — 82435 ASSAY OF BLOOD CHLORIDE: CPT

## 2024-04-02 PROCEDURE — 80053 COMPREHEN METABOLIC PANEL: CPT

## 2024-04-02 PROCEDURE — 71045 X-RAY EXAM CHEST 1 VIEW: CPT | Performed by: RADIOLOGY

## 2024-04-02 PROCEDURE — 84100 ASSAY OF PHOSPHORUS: CPT

## 2024-04-02 PROCEDURE — 36620 INSERTION CATHETER ARTERY: CPT

## 2024-04-02 PROCEDURE — 93005 ELECTROCARDIOGRAM TRACING: CPT

## 2024-04-02 PROCEDURE — 83735 ASSAY OF MAGNESIUM: CPT

## 2024-04-02 RX ORDER — FUROSEMIDE 10 MG/ML
20 INJECTION INTRAMUSCULAR; INTRAVENOUS 2 TIMES DAILY
Status: DISCONTINUED | OUTPATIENT
Start: 2024-04-02 | End: 2024-04-06 | Stop reason: HOSPADM

## 2024-04-02 RX ORDER — AMLODIPINE BESYLATE 10 MG/1
10 TABLET ORAL DAILY
Status: DISCONTINUED | OUTPATIENT
Start: 2024-04-02 | End: 2024-04-06 | Stop reason: HOSPADM

## 2024-04-02 RX ORDER — ATENOLOL 50 MG/1
100 TABLET ORAL DAILY
Status: DISCONTINUED | OUTPATIENT
Start: 2024-04-02 | End: 2024-04-06 | Stop reason: HOSPADM

## 2024-04-02 RX ORDER — POLYETHYLENE GLYCOL 3350 17 G/17G
17 POWDER, FOR SOLUTION ORAL DAILY
Status: DISCONTINUED | OUTPATIENT
Start: 2024-04-02 | End: 2024-04-06 | Stop reason: HOSPADM

## 2024-04-02 RX ORDER — ACETAMINOPHEN 325 MG/1
650 TABLET ORAL EVERY 4 HOURS PRN
Status: DISCONTINUED | OUTPATIENT
Start: 2024-04-02 | End: 2024-04-06 | Stop reason: HOSPADM

## 2024-04-02 RX ORDER — ACETAMINOPHEN 160 MG/5ML
650 SOLUTION ORAL EVERY 4 HOURS PRN
Status: DISCONTINUED | OUTPATIENT
Start: 2024-04-02 | End: 2024-04-06 | Stop reason: HOSPADM

## 2024-04-02 RX ORDER — ATORVASTATIN CALCIUM 10 MG/1
10 TABLET, FILM COATED ORAL NIGHTLY
Status: DISCONTINUED | OUTPATIENT
Start: 2024-04-02 | End: 2024-04-06 | Stop reason: HOSPADM

## 2024-04-02 RX ORDER — DEXMEDETOMIDINE HYDROCHLORIDE 4 UG/ML
.1-1.5 INJECTION, SOLUTION INTRAVENOUS CONTINUOUS
Status: DISCONTINUED | OUTPATIENT
Start: 2024-04-02 | End: 2024-04-06 | Stop reason: HOSPADM

## 2024-04-02 RX ADMIN — PANTOPRAZOLE SODIUM 40 MG: 40 INJECTION, POWDER, FOR SOLUTION INTRAVENOUS at 06:16

## 2024-04-02 RX ADMIN — INSULIN LISPRO 3 UNITS: 100 INJECTION, SOLUTION INTRAVENOUS; SUBCUTANEOUS at 16:36

## 2024-04-02 RX ADMIN — POLYETHYLENE GLYCOL 3350 17 G: 17 POWDER, FOR SOLUTION ORAL at 21:29

## 2024-04-02 RX ADMIN — INSULIN LISPRO 6 UNITS: 100 INJECTION, SOLUTION INTRAVENOUS; SUBCUTANEOUS at 20:50

## 2024-04-02 RX ADMIN — DEXMEDETOMIDINE HYDROCHLORIDE 0.2 MCG/KG/HR: 400 INJECTION INTRAVENOUS at 15:13

## 2024-04-02 RX ADMIN — PROPOFOL 50 MCG/KG/MIN: 10 INJECTION, EMULSION INTRAVENOUS at 06:16

## 2024-04-02 RX ADMIN — FUROSEMIDE 20 MG: 10 INJECTION, SOLUTION INTRAMUSCULAR; INTRAVENOUS at 11:06

## 2024-04-02 RX ADMIN — AMLODIPINE BESYLATE 10 MG: 10 TABLET ORAL at 20:49

## 2024-04-02 RX ADMIN — HEPARIN SODIUM 7500 UNITS: 5000 INJECTION INTRAVENOUS; SUBCUTANEOUS at 23:13

## 2024-04-02 RX ADMIN — METHYLPREDNISOLONE SODIUM SUCCINATE 30 MG: 40 INJECTION, POWDER, FOR SOLUTION INTRAMUSCULAR; INTRAVENOUS at 20:49

## 2024-04-02 RX ADMIN — PROPOFOL 50 MCG/KG/MIN: 10 INJECTION, EMULSION INTRAVENOUS at 04:15

## 2024-04-02 RX ADMIN — INSULIN LISPRO 3 UNITS: 100 INJECTION, SOLUTION INTRAVENOUS; SUBCUTANEOUS at 07:57

## 2024-04-02 RX ADMIN — METHYLPREDNISOLONE SODIUM SUCCINATE 30 MG: 40 INJECTION, POWDER, FOR SOLUTION INTRAMUSCULAR; INTRAVENOUS at 08:23

## 2024-04-02 RX ADMIN — FUROSEMIDE 20 MG: 10 INJECTION, SOLUTION INTRAMUSCULAR; INTRAVENOUS at 20:50

## 2024-04-02 RX ADMIN — MONTELUKAST 10 MG: 10 TABLET, FILM COATED ORAL at 20:49

## 2024-04-02 RX ADMIN — INSULIN LISPRO 3 UNITS: 100 INJECTION, SOLUTION INTRAVENOUS; SUBCUTANEOUS at 03:12

## 2024-04-02 RX ADMIN — PIPERACILLIN SODIUM AND TAZOBACTAM SODIUM 4.5 G: 4; .5 INJECTION, SOLUTION INTRAVENOUS at 13:16

## 2024-04-02 RX ADMIN — HEPARIN SODIUM 7500 UNITS: 5000 INJECTION INTRAVENOUS; SUBCUTANEOUS at 13:16

## 2024-04-02 RX ADMIN — ATORVASTATIN CALCIUM 10 MG: 10 TABLET, FILM COATED ORAL at 20:50

## 2024-04-02 RX ADMIN — PROPOFOL 50 MCG/KG/MIN: 10 INJECTION, EMULSION INTRAVENOUS at 01:47

## 2024-04-02 RX ADMIN — BUDESONIDE 0.5 MG: 0.5 INHALANT RESPIRATORY (INHALATION) at 21:53

## 2024-04-02 RX ADMIN — ATENOLOL 100 MG: 50 TABLET ORAL at 20:49

## 2024-04-02 RX ADMIN — Medication 150 MCG/HR: at 06:16

## 2024-04-02 RX ADMIN — PIPERACILLIN SODIUM AND TAZOBACTAM SODIUM 4.5 G: 4; .5 INJECTION, SOLUTION INTRAVENOUS at 06:16

## 2024-04-02 RX ADMIN — IPRATROPIUM BROMIDE AND ALBUTEROL SULFATE 3 ML: 2.5; .5 SOLUTION RESPIRATORY (INHALATION) at 17:14

## 2024-04-02 RX ADMIN — Medication 60 PERCENT: at 17:14

## 2024-04-02 RX ADMIN — PIPERACILLIN SODIUM AND TAZOBACTAM SODIUM 4.5 G: 4; .5 INJECTION, SOLUTION INTRAVENOUS at 23:13

## 2024-04-02 RX ADMIN — INSULIN LISPRO 3 UNITS: 100 INJECTION, SOLUTION INTRAVENOUS; SUBCUTANEOUS at 12:01

## 2024-04-02 RX ADMIN — HEPARIN SODIUM 7500 UNITS: 5000 INJECTION INTRAVENOUS; SUBCUTANEOUS at 06:16

## 2024-04-02 ASSESSMENT — PAIN SCALES - GENERAL
PAINLEVEL_OUTOF10: 0 - NO PAIN
PAINLEVEL_OUTOF10: 0 - NO PAIN

## 2024-04-02 ASSESSMENT — COGNITIVE AND FUNCTIONAL STATUS - GENERAL
MOVING TO AND FROM BED TO CHAIR: TOTAL
TURNING FROM BACK TO SIDE WHILE IN FLAT BAD: TOTAL
WALKING IN HOSPITAL ROOM: TOTAL
HELP NEEDED FOR BATHING: TOTAL
CLIMB 3 TO 5 STEPS WITH RAILING: TOTAL
PERSONAL GROOMING: TOTAL
DRESSING REGULAR UPPER BODY CLOTHING: TOTAL
MOBILITY SCORE: 6
TOILETING: TOTAL
DAILY ACTIVITIY SCORE: 6
STANDING UP FROM CHAIR USING ARMS: TOTAL
MOVING FROM LYING ON BACK TO SITTING ON SIDE OF FLAT BED WITH BEDRAILS: TOTAL
EATING MEALS: TOTAL
DRESSING REGULAR LOWER BODY CLOTHING: TOTAL

## 2024-04-02 ASSESSMENT — RESPIRATORY DISTRESS OBSERVATION SCALE (RDOS)
HEART RATE PER MINUTE: 0 - <90 BEATS
PARADOXICAL BREATHING PATTERN: 0 - NONE
PARADOXICAL BREATHING PATTERN: 0 - NONE
INVOLUNTARY NASAL FLARING: 0 - NONE
RESPIRATORY RATE PER MINUTE: 0 - <19 BREATHS
ACCESSORY MUSCLE RISE IN CLAVICLE DURING INSPIRATION: 0 - NONE
RDOS TOTAL SCORE: 0
GRUNTING AT END OF EXPIRATION: 0 - NONE
ACCESSORY MUSCLE RISE IN CLAVICLE DURING INSPIRATION: 0 - NONE
PARADOXICAL BREATHING PATTERN: 0 - NONE
GRUNTING AT END OF EXPIRATION: 0 - NONE
ACCESSORY MUSCLE RISE IN CLAVICLE DURING INSPIRATION: 0 - NONE
RDOS TOTAL SCORE: 0
ACCESSORY MUSCLE RISE IN CLAVICLE DURING INSPIRATION: 0 - NONE
RESPIRATORY RATE PER MINUTE: 0 - <19 BREATHS
RDOS TOTAL SCORE: 0
RDOS TOTAL SCORE: 0
RESTLESS NONPURPOSEFUL MOVEMENTS: 0 - NONE
HEART RATE PER MINUTE: 0 - <90 BEATS
LOOK OF FEAR: 0 - NONE
RESTLESS NONPURPOSEFUL MOVEMENTS: 0 - NONE
GRUNTING AT END OF EXPIRATION: 0 - NONE
RESPIRATORY RATE PER MINUTE: 0 - <19 BREATHS
HEART RATE PER MINUTE: 0 - <90 BEATS
GRUNTING AT END OF EXPIRATION: 0 - NONE
RESTLESS NONPURPOSEFUL MOVEMENTS: 0 - NONE
RESPIRATORY RATE PER MINUTE: 0 - <19 BREATHS
INVOLUNTARY NASAL FLARING: 0 - NONE
LOOK OF FEAR: 0 - NONE
HEART RATE PER MINUTE: 0 - <90 BEATS
LOOK OF FEAR: 0 - NONE
LOOK OF FEAR: 0 - NONE
INVOLUNTARY NASAL FLARING: 0 - NONE
PARADOXICAL BREATHING PATTERN: 0 - NONE
RESTLESS NONPURPOSEFUL MOVEMENTS: 0 - NONE
INVOLUNTARY NASAL FLARING: 0 - NONE

## 2024-04-02 ASSESSMENT — PAIN - FUNCTIONAL ASSESSMENT
PAIN_FUNCTIONAL_ASSESSMENT: CPOT (CRITICAL CARE PAIN OBSERVATION TOOL)

## 2024-04-02 ASSESSMENT — ACTIVITIES OF DAILY LIVING (ADL): LACK_OF_TRANSPORTATION: NO

## 2024-04-02 NOTE — PROGRESS NOTES
Met with patient's daughter and VIKKI at the bedside. Per patient's daughter, patient lives at home with his wife. He is typically independent with all care and did not use device up until day prior to admission. States just a few days prior to admit, patient was ordered O2-2L PRN with ambulation. Denies issues obtaining meds/affording meds. PCP is Dr Rivas-just saw him on Friday.  States patient was doing outpatient PT for years for his Parkinson's. Will follow for dc planning. Patient is intubated and sedated currently.

## 2024-04-02 NOTE — PROGRESS NOTES
Critical Care Daily Progress        Subjective   Patient is a 63 y.o. male admitted on 4/1/2024  1:28 AM with the following indication(s) for ICU care postcardiac arrest from hypercapnia, currently on mechanical ventilation.     Overnight Events: Plan for sedation holiday today with propofol and fentanyl can use as needed pushes as fentanyl.  Has been rewarmed.  Breathing over the ventilator    Complaints: Intubated, sedated    Scheduled Medications:   budesonide, 0.5 mg, nebulization, BID  pantoprazole, 40 mg, oral, Daily before breakfast   Or  esomeprazole, 40 mg, nasoduodenal tube, Daily before breakfast   Or  pantoprazole, 40 mg, intravenous, Daily before breakfast  furosemide, 20 mg, intravenous, BID  heparin (porcine), 7,500 Units, subcutaneous, q8h YANIQUE  insulin lispro, 0-15 Units, subcutaneous, q4h  methylPREDNISolone sodium succinate (PF), 30 mg, intravenous, BID  montelukast, 10 mg, oral, Nightly  piperacillin-tazobactam, 4.5 g, intravenous, q8h         Continuous Medications:   dexmedeTOMIDine, 0.1-1.5 mcg/kg/hr, Last Rate: 0.2 mcg/kg/hr (04/02/24 1513)         PRN Medications:   PRN medications: dextrose, dextrose, glucagon, insulin regular, ipratropium-albuteroL, magnesium sulfate, magnesium sulfate, oxygen, potassium chloride CR **OR** potassium chloride, potassium chloride    Objective   Vitals:  Most Recent:  Vitals:    04/02/24 1302   BP:    Pulse:    Resp: 16   Temp:    SpO2:        24hr Min/Max:  Temp  Min: 35.4 °C (95.7 °F)  Max: 36 °C (96.8 °F)  Pulse  Min: 54  Max: 68  Resp  Min: 15  Max: 17  SpO2  Min: 93 %  Max: 100 %    LDA:  Arterial Line 04/01/24 Left (Active)   Placement Date/Time: 04/01/24 0590   Orientation: Left  Insertion attempts: 1   Number of days: 1       ETT  7.5 mm (Active)   Placement Date/Time: 04/01/24 0200   Placed by External Staff?: EMS  Single Lumen Tube Size: 7.5 mm  Cuffed: Yes  Location: Oral   Number of days: 1       Urethral Catheter 18 Fr. (Active)   Placement  Date/Time: 04/01/24 0143   Placed by: [email or web address]  Tube Size (Fr.): 18 Fr.  Catheter Balloon Size: 10 mL  Urine Returned: Yes   Number of days: 1       NG/OG/Feeding Tube OG - [place] sump 16 Fr Left mouth (Active)   Placement Date/Time: 04/01/24 0142   Placed by: [email or web address]  Type of Tube: (c) NG/OG Tube  Tube Type: OG - [place] sump  NG/OG Tube Size: 16 Fr  Tube Location: Left mouth   Number of days: 1         Vent settings:  Vent Mode: Assist control/Volume control plus  FiO2 (%):  [60 %] 60 %  S RR:  [16-24] 24  S VT:  [500 mL] 500 mL  PEEP/CPAP (cm H2O):  [6 cm H20-10 cm H20] 6 cm H20  MAP (cm H2O):  [13-18] 18    Hemodynamic parameters for last 24 hours:       I/O:    Intake/Output Summary (Last 24 hours) at 4/2/2024 1539  Last data filed at 4/2/2024 0800  Gross per 24 hour   Intake 2672 ml   Output 815 ml   Net 1857 ml       Physical Exam:   Physical Exam   Physical Exam  Vitals and nursing note reviewed.   Constitutional:       General: He is not in acute distress.     Appearance: He is obese. He is not ill-appearing or toxic-appearing.      Comments: Intubated, +corneals, +cough and gag   HENT:      Head: Normocephalic and atraumatic.   Eyes:      Extraocular Movements: Extraocular movements intact.      Pupils: Pupils are equal, round, and reactive to light.   Cardiovascular:      Rate and Rhythm: Normal rate and regular rhythm.      Pulses: Normal pulses.      Heart sounds: Normal heart sounds. No murmur heard.  Pulmonary:      Effort: Pulmonary effort is normal. No respiratory distress.      Breath sounds: Normal breath sounds. No stridor. No wheezing, rhonchi or rales.   Abdominal:      General: Abdomen is flat. Bowel sounds are normal. There is no distension.      Palpations: Abdomen is soft. There is no mass.      Tenderness: There is no abdominal tenderness. There is no guarding.      Hernia: No hernia is present.   Musculoskeletal:         General: No swelling or deformity.      Right lower leg: No edema.       Left lower leg: No edema.   Skin:     General: Skin is warm and dry.      Capillary Refill: Capillary refill takes 2 to 3 seconds.      Coloration: Skin is not jaundiced.      Findings: No rash.   Neurological:      Comments: Not responsive to commands   Psychiatric:         Behavior: Behavior normal.         Lab/Radiology/Diagnostic Review:  Results for orders placed or performed during the hospital encounter of 04/01/24 (from the past 24 hour(s))   POCT GLUCOSE   Result Value Ref Range    POCT Glucose 275 (H) 74 - 99 mg/dL   POCT GLUCOSE   Result Value Ref Range    POCT Glucose 259 (H) 74 - 99 mg/dL   POCT GLUCOSE   Result Value Ref Range    POCT Glucose 259 (H) 74 - 99 mg/dL   POCT GLUCOSE   Result Value Ref Range    POCT Glucose 203 (H) 74 - 99 mg/dL   POCT GLUCOSE   Result Value Ref Range    POCT Glucose 171 (H) 74 - 99 mg/dL   POCT GLUCOSE   Result Value Ref Range    POCT Glucose 141 (H) 74 - 99 mg/dL   POCT GLUCOSE   Result Value Ref Range    POCT Glucose 147 (H) 74 - 99 mg/dL   CBC   Result Value Ref Range    WBC 12.1 (H) 4.4 - 11.3 x10*3/uL    nRBC 0.0 0.0 - 0.0 /100 WBCs    RBC 3.08 (L) 4.50 - 5.90 x10*6/uL    Hemoglobin 9.4 (L) 13.5 - 17.5 g/dL    Hematocrit 30.5 (L) 41.0 - 52.0 %    MCV 99 80 - 100 fL    MCH 30.5 26.0 - 34.0 pg    MCHC 30.8 (L) 32.0 - 36.0 g/dL    RDW 13.3 11.5 - 14.5 %    Platelets 182 150 - 450 x10*3/uL   Comprehensive Metabolic Panel   Result Value Ref Range    Glucose 198 (H) 74 - 99 mg/dL    Sodium 137 136 - 145 mmol/L    Potassium 4.3 3.5 - 5.3 mmol/L    Chloride 101 98 - 107 mmol/L    Bicarbonate 28 21 - 32 mmol/L    Anion Gap 12 10 - 20 mmol/L    Urea Nitrogen 40 (H) 6 - 23 mg/dL    Creatinine 1.43 (H) 0.50 - 1.30 mg/dL    eGFR 55 (L) >60 mL/min/1.73m*2    Calcium 8.3 (L) 8.6 - 10.3 mg/dL    Albumin 3.3 (L) 3.4 - 5.0 g/dL    Alkaline Phosphatase 65 33 - 136 U/L    Total Protein 5.8 (L) 6.4 - 8.2 g/dL    AST 25 9 - 39 U/L    Bilirubin, Total 0.4 0.0 - 1.2 mg/dL    ALT 33 10 - 52  U/L   Magnesium   Result Value Ref Range    Magnesium 2.19 1.60 - 2.40 mg/dL   Phosphorus   Result Value Ref Range    Phosphorus 4.2 2.5 - 4.9 mg/dL   POCT GLUCOSE   Result Value Ref Range    POCT Glucose 189 (H) 74 - 99 mg/dL   ECG 12 lead   Result Value Ref Range    Ventricular Rate 59 BPM    Atrial Rate 59 BPM    SD Interval 166 ms    QRS Duration 160 ms    QT Interval 534 ms    QTC Calculation(Bazett) 528 ms    P Axis 40 degrees    R Axis -23 degrees    T Axis 32 degrees    QRS Count 10 beats    Q Onset 210 ms    P Onset 127 ms    P Offset 196 ms    T Offset 477 ms    QTC Fredericia 531 ms   POCT GLUCOSE   Result Value Ref Range    POCT Glucose 199 (H) 74 - 99 mg/dL   POCT GLUCOSE   Result Value Ref Range    POCT Glucose 181 (H) 74 - 99 mg/dL   Blood Gas Arterial Full Panel   Result Value Ref Range    POCT pH, Arterial 7.29 (L) 7.38 - 7.42 pH    POCT pCO2, Arterial 66 (H) 38 - 42 mm Hg    POCT pO2, Arterial 92 85 - 95 mm Hg    POCT SO2, Arterial 98 94 - 100 %    POCT Oxy Hemoglobin, Arterial 96.1 94.0 - 98.0 %    POCT Hematocrit Calculated, Arterial 34.0 (L) 41.0 - 52.0 %    POCT Sodium, Arterial 135 (L) 136 - 145 mmol/L    POCT Potassium, Arterial 4.8 3.5 - 5.3 mmol/L    POCT Chloride, Arterial 102 98 - 107 mmol/L    POCT Ionized Calcium, Arterial 1.20 1.10 - 1.33 mmol/L    POCT Glucose, Arterial 221 (H) 74 - 99 mg/dL    POCT Lactate, Arterial 0.6 0.4 - 2.0 mmol/L    POCT Base Excess, Arterial 3.6 (H) -2.0 - 3.0 mmol/L    POCT HCO3 Calculated, Arterial 31.7 (H) 22.0 - 26.0 mmol/L    POCT Hemoglobin, Arterial 11.4 (L) 13.5 - 17.5 g/dL    POCT Anion Gap, Arterial 6 (L) 10 - 25 mmo/L    Patient Temperature      FiO2 60 %    Ventilator Mode A/C     Ventilator Rate 20 bpm    Tidal Volume 500 mL    Peep CHM2O 6.0 cm H2O    Site of Arterial Puncture Arterial Line      ECG 12 lead    Result Date: 4/2/2024  ** * Pediatric ECG analysis * ** Sinus rhythm with Possible Premature atrial complexes with Aberrant conduction  Left axis deviation Right bundle branch block PEDIATRIC ANALYSIS - MANUAL COMPARISON REQUIRED When compared with ECG of 29-MAR-2024 14:55, PREVIOUS ECG IS PRESENT    XR chest 1 view    Result Date: 4/2/2024  Interpreted By:  Stephon Juarez, STUDY: XR CHEST 1 VIEW;  4/2/2024 10:04 am   INDICATION: Signs/Symptoms:re-assess, on vent.   COMPARISON: 04/01/2024   ACCESSION NUMBER(S): [id number]   ORDERING CLINICIAN: NIKKI PULIDO   FINDINGS: Support tubes are unchanged. Diffuse bilateral airspace consolidation not significantly changed. Cardiomegaly. Pleural effusions not excluded. Pulmonary vascular congestion.       No significant change compared to yesterday.   MACRO: None   Signed by: Stephon Juarez 4/2/2024 11:08 AM Dictation workstation:   [id number]    ECG 12 lead    Result Date: 4/2/2024  Sinus bradycardia Right bundle branch block Abnormal ECG When compared with ECG of 01-APR-2024 12:56, (unconfirmed) No significant change was found    ECG 12 Lead    Result Date: 4/1/2024  Normal sinus rhythm Right bundle branch block T wave abnormality, consider lateral ischemia Abnormal ECG When compared with ECG of 29-MAR-2024 14:55, (unconfirmed) Criteria for Anteroseptal infarct are no longer Present Nonspecific T wave abnormality now evident in Inferior leads    CT head wo IV contrast    Result Date: 4/1/2024  Interpreted By:  Isaiah Reynolds, STUDY: CT HEAD WO IV CONTRAST;  4/1/2024 4:40 am   INDICATION: Signs/Symptoms:cardiac arrest. myoclonic jerking.   COMPARISON: 10/25/2023   ACCESSION NUMBER(S): [id number]   ORDERING CLINICIAN: FIFI CHAPARRO   TECHNIQUE: Noncontrast axial CT scan of head was performed. Angled reformats in brain and bone windows were generated. The images were reviewed in bone, brain, blood and soft tissue windows.   FINDINGS: Global volume loss and moderate chronic small vessel ischemic change as seen on prior brain MRI.   No findings of acute hemorrhage. No mass effect. No midline shift. Stable  ventricular size   If persistent concern, consider MRI. Mastoid air cells and paranasal sinuses are clear for mild ethmoid sinus mucosal thickening       Senescent changes. No evidence of acute intracranial abnormality. If persistent concern, further evaluation is advised with MRI   No evidence of intracranial hemorrhage or displaced skull fracture.   MACRO: None   Signed by: Isaiah Reynolds 4/1/2024 6:13 AM Dictation workstation:   [id number]    XR chest 1 view    Result Date: 4/1/2024  STUDY: Chest Radiograph;  4/1/2024 INDICATION: Chest pain. COMPARISON: 3/29/2024 XR Chest ACCESSION NUMBER(S): [id number] ORDERING CLINICIAN: FIFI CHAPARRO TECHNIQUE:  Frontal chest was obtained at 01:52 hours. FINDINGS: Endotracheal tube tip is approximately 4.5 cm above the mee. Nasogastric tube tip identified to the distal esophagus. Mild cardiac enlargement.  Patchy bilateral airspace disease.  No discernible pleural effusion or pneumothorax.    Patchy bilateral airspace disease.  Congestive failure vs. pneumonia. Correlate clinically. Endotracheal tube tip is approximately 4.5 cm above the mee.  Nasogastric tube tip identified to the distal esophagus.  Continued advancement recommended. Signed by Gilberto Parham MD    XR abdomen 1 view    Result Date: 4/1/2024  Interpreted By:  Michael Brito, STUDY: XR ABDOMEN 1 VIEW; ;  4/1/2024 2:25 am   INDICATION: Signs/Symptoms:og placement.   COMPARISON: 03/29/2024   ACCESSION NUMBER(S): [id number]   ORDERING CLINICIAN: FIFI CHAPARRO   FINDINGS: Single AP radiograph of the right upper abdomen. NG/OG tube tip projects over the distal esophagus. Streaky bibasilar airspace opacities. Upper abdomen is unremarkable. Remote right anterior rib fractures.       1. NG/OG tube tip projects over the distal esophagus. Consider advancement.   Signed by: Michael Brito 4/1/2024 3:06 AM Dictation workstation:   [id number]    ECG 12 lead    Result Date: 3/31/2024  Sinus rhythm with  occasional Premature ventricular complexes Right bundle branch block Abnormal ECG When compared with ECG of 27-NOV-2017 11:13, Premature ventricular complexes are now Present QRS axis Shifted left    ECG 12 lead    Result Date: 3/31/2024  Normal sinus rhythm Right bundle branch block Anteroseptal infarct , age undetermined Abnormal ECG When compared with ECG of 29-MAR-2024 12:10, (unconfirmed) Premature ventricular complexes are no longer Present Anteroseptal infarct is now Present    Transthoracic Echo (TTE) Complete    Result Date: 3/30/2024            [facility] [id number] [facility] [place] [address]    Tel [phone] Fax [phone] TRANSTHORACIC ECHOCARDIOGRAM REPORT  Patient Name:      PIPER Gimenez Physician:    [id number] Feliberto Roberts MD Study Date:        3/30/2024             Ordering Provider:    [id number] ELISE FONG MRN/PID:           [id number]              Fellow: Accession#:        [id number]          Nurse: Date of Birth/Age: 1960 / 63 years Sonographer:          Cleo Villarreal [facility] Gender:            M                     Additional Staff: Height:            175.26 cm             Admit Date:           3/29/2024 Weight:            136.08 kg             Admission Status:     Inpatient -                                                                Priority                                                                discharge BSA / BMI:         2.45 m2 / 44.30 kg/m2 Department Location:  [address] Blood Pressure: 138 /65 mmHg Study Type:    TRANSTHORACIC ECHO (TTE) COMPLETE Diagnosis/ICD: Other ill defined heart diseases-I51.89 Indication:    Congestive Heart Failure CPT Codes:     Echo Complete w Full [id number] Patient History: Diabetes:          Yes BMI:               Overweight 25 - 30 Pertinent History: HTN, Hyperlipidemia, COPD  and Dyspnea. HARRIETT; Parkinsons                    disease; no previous echocardiogram. Study Detail: The following Echo studies were performed: 2D, M-Mode, Doppler and               color flow. Technically challenging study due to body habitus and               patient lying in supine position.  PHYSICIAN INTERPRETATION: Left Ventricle: Left ventricular systolic function is normal, with an estimated ejection fraction of 55-60%. There are no regional wall motion abnormalities. The left ventricular cavity size is normal. Spectral Doppler shows a normal pattern of left ventricular diastolic filling. Left Atrium: The left atrium was not well visualized. Right Ventricle: The right ventricle is mildly enlarged. Not well visualized. Right Atrium: The right atrium was not well visualized. Aortic Valve: The aortic valve appears structurally normal. There is no evidence of aortic valve regurgitation. The peak instantaneous gradient of the aortic valve is 9.4 mmHg. Mitral Valve: The mitral valve is normal in structure. There is trace mitral valve regurgitation. Tricuspid Valve: The tricuspid valve is structurally normal. There is trace tricuspid regurgitation. The estimated RVSP is 46 mm. Pulmonic Valve: The pulmonic valve is structurally normal. There is no indication of pulmonic valve regurgitation. Pericardium: There is no pericardial effusion noted. Aorta: The aortic root is normal.  CONCLUSIONS:  1. Left ventricular systolic function is normal with a 55-60% estimated ejection fraction.  2. The estimated RVSP is 46 mm. QUANTITATIVE DATA SUMMARY: 2D MEASUREMENTS:                           Normal Ranges: LAs:           5.54 cm    (2.7-4.0cm) IVSd:          0.97 cm    (0.6-1.1cm) LVPWd:         1.14 cm    (0.6-1.1cm) LVIDd:         6.19 cm    (3.9-5.9cm) LVIDs:         4.24 cm LV Mass Index: 114.1 g/m2 LV % FS        31.6 % LA VOLUME:                               Normal Ranges: LA Vol A4C:        72.5 ml    (22+/-6mL/m2) LA  Vol Index A4C:  29.6 ml/m2 LA Area A4C:       23.0 cm2 LA Major Axis A4C: 6.2 cm LA Vol A4C:        65.3 ml AORTA MEASUREMENTS:                      Normal Ranges: Ao Sinus, d: 3.20 cm (2.1-3.5cm) Ao STJ, d:   2.70 cm (1.7-3.4cm) Asc Ao, d:   2.80 cm (2.1-3.4cm) LV SYSTOLIC FUNCTION BY 2D PLANIMETRY (MOD):                     Normal Ranges: EF-A4C View: 49.8 % (>=55%) LV DIASTOLIC FUNCTION:                               Normal Ranges: MV Peak E:        1.04 m/s    (0.7-1.2 m/s) MV Peak A:        0.41 m/s    (0.42-0.7 m/s) E/A Ratio:        2.51        (1.0-2.2) MV lateral e'     0.07 m/s PulmV Sys Michel:    31.29 cm/s PulmV Blair Michel:   66.92 cm/s PulmV S/D Michel:    0.47 PulmV A Revs Michel: 19.62 cm/s PulmV A Revs Dur: 105.61 msec MITRAL VALVE:                 Normal Ranges: MV DT: 188 msec (150-240msec) AORTIC VALVE:                         Normal Ranges: AoV Vmax:      1.53 m/s (<=1.7m/s) AoV Peak P.4 mmHg (<20mmHg) LVOT Max Michel:  0.80 m/s (<=1.1m/s) LVOT VTI:      15.43 cm LVOT Diameter: 1.76 cm  (1.8-2.4cm) AoV Area,Vmax: 1.26 cm2 (2.5-4.5cm2)  RIGHT VENTRICLE: RV Basal 5.80 cm RV Mid   5.70 cm RV Major 7.4 cm TAPSE:   17.0 mm RV s'    0.11 m/s TRICUSPID VALVE/RVSP:                             Normal Ranges: Peak TR Velocity: 3.09 m/s RV Syst Pressure: 46.1 mmHg (< 30mmHg) PULMONIC VALVE:                         Normal Ranges: PV Accel Time: 128 msec (>120ms) PV Max Michel:    1.1 m/s  (0.6-0.9m/s) PV Max P.0 mmHg Pulmonary Veins: PulmV A Revs Dur: 105.61 msec PulmV A Revs Michel: 19.62 cm/s PulmV Blair Michel:   66.92 cm/s PulmV S/D Michel:    0.47 PulmV Sys Michel:    31.29 cm/s AORTA: Asc Ao Diam 2.83 cm  [id number] Feliberto Roberts MD Electronically signed on 3/30/2024 at 3:13:31 PM  ** Final **     CT angio chest for pulmonary embolism    Result Date: 3/29/2024  STUDY: CT Angiogram of the Chest; 2024, 1:44 PM. INDICATION: Hypoxia ,shortness of breath, elevate D-dimer. COMPARISON: CXR: 2024. ACCESSION  NUMBER(S): [id number] ORDERING CLINICIAN: DARCY CLEVELAND TECHNIQUE:  CTA of the chest was performed with intravenous contrast. Images are reviewed and processed at a workstation according to the CT angiogram protocol with 3-D and/or MIP post processing imaging generated.  Omnipaque 350 60 mL was administered intravenously. Automated mA/kV exposure control was utilized and patient examination was performed in strict accordance with principles of ALARA. FINDINGS: Pulmonary arteries are adequately opacified without acute or chronic filling defects.  The thoracic aorta is normal in course and caliber without dissection or aneurysm. Atherosclerosis of the coronary arteries is present.  The heart is mildly enlarged without pericardial effusion.  Prominent mediastinal lymph nodes are present, likely reactive. Representative subcarinal lymph node measures 1.8 cm short axis dimension. Mild atelectatic changes are present.  Trace bilateral pleural effusions are present. There is no pneumothorax.  The airways are patent. No consolidation, interstitial disease, or suspicious nodules. Low-attenuation lesion about the anterior aspect of the LEFT hepatic lobe measures 3.2 cm in size, most likely cysts. There are no acute fractures.  No suspicious bony lesions.  Senescent changes of the thoracic spine are present.      1. There is no evidence of pulmonary embolus. 2. Mild cardiomegaly. Atherosclerosis of the coronary arteries is present. Trace bilateral pleural effusions are present. 3. Mediastinal adenopathy, likely reactive. Signed by Danny Gtz II, MD    XR chest 1 view    Result Date: 3/29/2024  STUDY: Chest Radiograph;  3/29/2024 12:34 PM. INDICATION: Chest pain. COMPARISON: None Available. ACCESSION NUMBER(S): [id number] ORDERING CLINICIAN: DARCY CLEVELAND TECHNIQUE:  Frontal chest was obtained at 12:34 hours. FINDINGS: CARDIOMEDIASTINAL SILHOUETTE: Cardiomediastinal silhouette is normal in size and  configuration.  LUNGS: Lungs are clear.  ABDOMEN: No remarkable upper abdominal findings.  BONES: No acute osseous changes.  Postoperative changes of the right shoulder.    No acute cardiopulmonary abnormality. Signed by Sae Garcia MD            This patient has a urinary catheter   Reason for the urinary catheter remaining today? critically ill patient who need accurate urinary output measurements    This patient is intubated   Reason for patient to remain intubated today? they are planned for extubation trial later today/tonight, they are unable to protect their airway, they have continued cardiopulmonary lability/instability, and they have inadequate gas-exchange without positive pressure             Assessment/Plan   Principal Problem:    Cardiac arrest (CMS/[facility])  Active Problems:    COPD exacerbation (CMS/[facility])    HTN (hypertension)    Hyperlipidemia    Class 3 severe obesity due to excess calories with body mass index (BMI) of 40.0 to 44.9 in adult (CMS/[facility])    Parkinson disease (CMS/[facility])    Type 2 diabetes mellitus (CMS/[facility])    Acute hypoxic respiratory failure (CMS/[facility])    Assessment:    Neuro:   Parkinson's disease  MDD  AMS due to ?anoxia, ?hypercpnia, vs likely drug induced as on narcotics etc  -History: None  -Home meds: Sinemet  mg tablet 3 times daily, Wellbutrin  mg 12-hour tablet twice daily on hold  -GCS: E 1 V 1 M 1  -Pain: None  -Sedation: off, can use Precedex for agitation and sedation  -CIWA/COWS: None  -[facility] ICU    Cardiac:   Status post cardiac arrest due to hypercapnia s/p cooling  HLD  Essential HTN  - Goals:  MAP> 65, HR   - Home meds: Norvasc 10 mg tablet daily, atenolol 100 mg tablet daily, atorvastatin 2 mg tablet daily, Cozaar 1 mg tablet daily  - Last echo: 3/30/2024 LVEF 55-60%, estimated RVSP 46 mm.  Aortic root normal no regional wall motion abnormality LV cavity is normal size  - Pressors: None  -S/p 2 rounds of epinephrine, 3 amps of bicarb, 1 of calcium  gluconate completing 2 rounds of CPR regaining ROSC between 2 and 3 round of CPR. No downtime- CPR immediately started in EMS  -No need for general cardiology  -Given lasix 20mg IV BID (equivalent to home dose), likely component of fluid overload    Pulmonary:   Acute hypoxic respiratory failure due to hypercapnia  Acute hypoxic resp failure due to COPD on home O2, though noncompliant  HARRIETT on CPAP nightly but does not wear it nightly  -Home meds: Advair discus 500-50 micrograms-dose 1 puff twice daily, Singulair 10 mg tablet daily, albuterol 90 mcg rescue inhaler  Vent Mode: Assist control/Volume control plus  FiO2 (%):  [60 %] 60 %  S RR:  [16-24] 24  S VT:  [500 mL] 500 mL  PEEP/CPAP (cm H2O):  [6 cm H20-10 cm H20] 6 cm H20  MAP (cm H2O):  [13-18] 18   Continuous pulse oximetry   O2 PRN to maintain SpO2 > 94%, wean as tolerated  -Imaging: Chest x-ray today unchanged from yesterday, suggestive of pulmonary vascular congestion and cardiomegaly diffuse airspace consolidations  -DuoNebs every 6 hours and every 2 as needed, Singulair as well as Pulmicort ordered  -Solu-Medrol 30 mg twice daily    Gastrointestinal:   GERD  -History: none  -Home meds: none  - Diet: Enteral feeding via NG tube to be started today  - Supplementation: None  - Prophylaxis: Protonix 40 mg IV daily  - Bowel regimen: MiraLAX 17 g daily  - Last BM: Last BM Date: 04/01/24    Renal:   OUSMANE on CKD- resolved, prerenal in setting of arrest  - Daily RFP,Mg,Phos  - Home meds: Pulmonary with Lasix 2 times daily (hold), Januvia 1 mg tablet daily (Hold)  - Indwelling Parker with strict I/O  Net IO Since Admission: 2,775.88 mL [04/02/24 1539]  Results from last 72 hours   Lab Units 04/02/24  0308 04/01/24  1334   BUN mg/dL 40* 37*   CREATININE mg/dL 1.43* 1.59*       - Fluids: none  - Electrolytes: Replete per protocol, goal K>4 Phos >3 Mg >2    Endocrine:   IDDM type II  -Home meds: 50 units Lantus daily? (Review of record but not on med list), glipizide  (Hold), metformin (Hold), Ozempic (Hold), Januvia (hold) --on hold given insulin gtt and transition to SSI  -sliding scale: Moderate SSI #3, accu-checks every 4 hours, with hypoglycemic protocols in place  Results from last 7 days   Lab Units 24  1147 24  0728 24  0309 24  0308 24  0005 24  2109 24  1409 24  1334   POCT GLUCOSE mg/dL 181* 199* 189*  --  147* 141* 171*   < >  --    GLUCOSE mg/dL  --   --   --  198*  --   --   --   --  374*    < > = values in this interval not displayed.      -BG < 180 goal  -Likely due to uptitrate sugar control to including home medications given now starting on tube feeds    Hematology:   - Daily CBC, coags  -History: none  -Home meds: none  Results from last 7 days   Lab Units 24  0308 24  0608   HEMOGLOBIN g/dL 9.4* 9.2*   HEMATOCRIT % 30.5* 29.8*   PLATELETS AUTO x10*3/uL 182 195     - DVT Prophylaxis: heparin dvt px, SCDs     Infectious Disease:   ?community acquired pneumonia  Acute leukocytosis ? Reactive 2/2 recent systemic steroid therapy   -History: Recurrent COPD exacerbations treated with anti-inflammatories and antibiotics (noting most recent treatment initiated on 3/29 -4-day course of methylprednisolone and azithromycin)  -Home meds: as above  Results from last 7 days   Lab Units 24  0308 24  0608   WBC AUTO x10*3/uL 12.1* 10.8     Temp (24hrs), Av.8 °C (96.5 °F), Min:35.4 °C (95.7 °F), Max:36 °C (96.8 °F)     -Abx: S/p azithromycin 500 mg in the ED, 2 g vancomycin, currently on Zosyn, to de-escalate tomorrow  -Cultures: Blood cultures negative growth to date, urine strep and Legionella negative MRSA negative, tracheal aspirate ordered for today    Musculoskeletal:   -History: Review of clinical record shows patient has a ulcer to the plantar first digit right foot measuring one by one by half centimeter with appropriate granulation (2023)   -Home meds: none   - PT/OT when  appropriate     Lines/Tubes/Drains:   Peripheral Ivs  Arterial line left radial artery (4/1/2024)      CODE STATUS: DNR    Disposition: To remain in ICU given mechanical ventilation.  Possible transition to hospice care pending neurological status postarrest    ABCDEF Checklist  Analgesia: Spontaneous awakening trial to be pursued if clinically appropriate. RASS goal reviewed  Breathing: Spontaneous breathing trial to be pursued if clinically appropriate. Mechanical power of assisted ventilation reviewed  Choice of analgesia/sedation: Analgesic and sedative agents adjusted per clinical context.   Delirium assessed by CAM, will avoid exacerbating factors  Early mobility and exercise: Physical and occupational therapy engaged  Family: Plan of care, overall trajectory of patient shared with family. Questions elicited and answered as appropriate.     Assessment and plan to be discussed with attending.   Marizol Cunningham, DO  Critical Care Medicine

## 2024-04-02 NOTE — PROGRESS NOTES
Spiritual Care Visit    Clinical Encounter Type  Visited With: Patient and family together  Routine Visit: Follow-up  Continue Visiting: Yes    Voodoo Encounters  Voodoo Needs: Prayer         Sacramental Encounters  Communion: Patient is currently unable                             Taxonomy  Intended Effects: Establish rapport and connectedness, Cici affirmation, Build relationship of care and support, Demonstrate caring and concern, Helping someone feel comforted, Convey a calming presence, Promote sense of peace

## 2024-04-02 NOTE — PROGRESS NOTES
Gilberto Albarran is a 63 y.o. male on day 1 of admission presenting with Cardiac arrest (CMS/HCC).    Subjective   SW met with patient's family at bedside to introduce self and check in. Patient's daughter and son are at bedside and state that they understand that patient has been taken off sedation and that they are just waiting to see how he reacts over the next 24 hours. MD visited during palliative meeting and reiterated the plan. She states that if patient is unable to wake up without sedation and be functional enough to extubate, she does recommend hospice and withdrawal of care. Family is aware and understanding. This SW offered emotional support and states she will come back to talk with them again tomorrow and to provide guidance for next step decisions.      Objective     Physical Exam    Last Recorded Vitals  Blood pressure 117/62, pulse 54, temperature 35.4 °C (95.7 °F), resp. rate 16, weight 139 kg (306 lb 10.6 oz), SpO2 100 %.  Intake/Output last 3 Shifts:  I/O last 3 completed shifts:  In: 4870.1 (35 mL/kg) [I.V.:1720.5 (12.4 mL/kg); IV Piggyback:3149.6]  Out: 2030 (14.6 mL/kg) [Urine:1930 (0.4 mL/kg/hr); Emesis/NG output:100]  Weight: 139.1 kg     Assessment/Plan   Principal Problem:    Cardiac arrest (CMS/HCC)  Active Problems:    COPD exacerbation (CMS/HCC)    HTN (hypertension)    Hyperlipidemia    Class 3 severe obesity due to excess calories with body mass index (BMI) of 40.0 to 44.9 in adult (CMS/HCC)    Parkinson disease (CMS/HCC)    Type 2 diabetes mellitus (CMS/HCC)    Acute hypoxic respiratory failure (CMS/HCC)    Palliative will continue to follow.    Nerissa Petty, MOHAMUDW

## 2024-04-02 NOTE — PROGRESS NOTES
Nutrition Initial Assessment:   Nutrition Assessment    Reason for Assessment: Provider consult order    Patient History: Gilberto Albarran is a 63 y.o. male presenting to ED on 4/1 and admitted to ICU after witnessed cardiac arrest.  He was intubated initially with pressor support, fentanyl, and propofol.  Cardiology following as well as palliative.  NG tube in place.    Of note, pt had been admitted here from 3/29-3/31 with wheezing and SOB x 1 week.  Pt had fall prior to that admit where he was notably more confused afterwards.     Past Medical History: COPD (not on home O2), HARRIETT (wears BIPAP), Parkinson's (with hallucinations), HTN, HLD, morbid obesity, DM type 2     Nutrition History:  Energy Intake:  (NPO)  Food and Nutrient History: Unknown diet history prior to admission  Food Allergies/Intolerances:  None  GI Symptoms: None  Oral Problems:  NPO on vent support       Current Diet: NPO Diet; Effective now    Anthropometrics:      Weight: 139 kg (306 lb 10.6 oz)   BMI (Calculated): 45.27             Weight Change %:  Weight History / % Weight Change: Wt gain noted per archives, no edema noted at this time   02/07/23 09:09 10/03/23 13:06 03/29/24 03/30/24 03/31/24   Weight 129 kg (285 lb) 132 kg (290 lb) 137 kg (302 lb 7.5 oz) 136 kg (300 lb 7.8 oz) 136 kg (299 lb 13.2 oz)     Pain Assessment: CPOT  Pain Score: 0 - No pain    Nutrition Significant Labs:  BMP Trend:   Results from last 7 days   Lab Units 04/02/24  0308 04/01/24  1334 04/01/24  0608 04/01/24  0139   GLUCOSE mg/dL 198* 374* 424* 353*   CALCIUM mg/dL 8.3* 8.1* 7.2* 8.9   SODIUM mmol/L 137 137 137 135*   POTASSIUM mmol/L 4.3 3.9 3.8 5.3   CO2 mmol/L 28 29 24 22   CHLORIDE mmol/L 101 101 104 99   BUN mg/dL 40* 37* 33* 34*   CREATININE mg/dL 1.43* 1.59* 1.37* 1.60*    , A1C:  Lab Results   Component Value Date    HGBA1C 5.2 04/01/2024       Nutrition Specific Medications:  Scheduled medications  budesonide, 0.5 mg, nebulization, BID  pantoprazole, 40 mg,  oral, Daily before breakfast   Or  esomeprazole, 40 mg, nasoduodenal tube, Daily before breakfast   Or  pantoprazole, 40 mg, intravenous, Daily before breakfast  furosemide, 20 mg, intravenous, BID  heparin (porcine), 7,500 Units, subcutaneous, q8h YANIQUE  insulin lispro, 0-15 Units, subcutaneous, q4h  methylPREDNISolone sodium succinate (PF), 30 mg, intravenous, BID  montelukast, 10 mg, oral, Nightly  oxygen, , inhalation, Continuous - Inhalation  piperacillin-tazobactam, 4.5 g, intravenous, q8h      Continuous medications  fentaNYL, 0-300 mcg/hr, Last Rate: 100 mcg/hr (04/02/24 0815)      Nutrition Focused Physical Exam Findings:  Subcutaneous Fat Loss:   Orbital Fat Pads: Well nourished (slightly bulging fat pads)  Buccal Fat Pads: Defer  Triceps: Defer  Muscle Wasting:  Temporalis: Well nourished (well-defined muscle)  Pectoralis (Clavicular Region): Well nourished (clavicle not visible)  Deltoid/Trapezius: Well nourished (rounded appearance at arm, shoulder, neck)  Interosseous: Defer  Trapezius/Infraspinatus/Supraspinatus (Scapular Region): Defer  Quadriceps: Defer  Gastrocnemius: Defer  Edema:  Edema: none  Physical Findings:  Hair: Negative  Eyes: Negative  Mouth: Negative  Nails: Negative  Skin: Negative    I/O:   Last BM Date: 04/01/24; Stool Appearance: Loose (04/01/24 0500)     Estimated Needs:   Total Energy Estimated Needs (kCal):  (1960-2450kcal or 20-25kcal/kg ABW of 98.1kg)  Total Protein Estimated Needs (g):  (118-137g or 1.2-1.4g ABW of 98.1kg)  Total Fluid Estimated Needs (mL):  (1ml/kcal or per MD recs)     Ideal body weight: 70.7 kg (155 lb 13.8 oz)  Adjusted ideal body weight: 98.1 kg (216 lb 2.9 oz)          Nutrition Diagnosis        Nutrition Diagnosis  Patient has Nutrition Diagnosis: Yes  Diagnosis Status (1): New  Nutrition Diagnosis 1: Increased nutrient needs  Related to (1): acute nutritional stress  As Evidenced by (1): recent cardiac arrest requiring vent support, currently unable to  extubate with need for EN support.       Nutrition Interventions/Recommendations         Nutrition Prescription:  Individualized Nutrition Prescription Provided for : If desired, recommend initiation of EN support via NG tube        Nutrition Interventions:   Food and/or Nutrient Delivery Interventions  Interventions: Enteral intake  Enteral Intake: Other (Comment)  Goal: Consider initiating glucerna 1.5 at 15ml/hr with goal to advance by 10ml/hr ever 4-6hours with tolerance to goal rate of 65ml/hr to provide 2340kcal, 129g pro, and 1184ml free water; suggest water flush of 195ml 6x/day to provide total fluid volume of 2354ml (adjust pending fluid needs)    Coordination of Nutrition Care by a Nutrition Professional  Collaboration and Referral of Nutrition Care: Collaboration by nutrition professional with other providers  Goal: CASIMIRO Valadez       Nutrition Monitoring and Evaluation   Food/Nutrient Related History Monitoring  Monitoring and Evaluation Plan: Enteral and parenteral nutrition intake  Criteria: Initiate EN within the next 24-48hrs if in agreement with pt wishes and plan of care  Enteral and Parenteral Nutrition Intake: Enteral nutrition intake  Criteria: Pt will tolerate goal rate within 24-48hrs         Biochemical Data, Medical Tests and Procedures  Monitoring and Evaluation Plan: Electrolyte/renal panel  Electrolyte and Renal Panel: Phosphorus, Magnesium, Chloride, Sodium, BUN  Criteria: Maintain within acceptable range            Time Spent/Follow-up Reminder:   Time Spent (min): 60 minutes  Last Date of Nutrition Visit: 04/02/24  Nutrition Follow-Up Needed?: 3-5 days  Follow up Comment: GREGORY MONTOYA recs in

## 2024-04-02 NOTE — PROGRESS NOTES
Physical Therapy                 Therapy Communication Note    Patient Name: Gilberto Albarran  MRN: [id number]  Today's Date: 4/2/2024     Discipline: Physical Therapy    Missed Time: No Show    Comment:  Patient no showed follow-up this date at scheduled appointment time of 1445.

## 2024-04-02 NOTE — CONSULTS
Inpatient consult to Cardiology  Consult performed by: Ahmet Blount MD  Consult ordered by: Danny Llanes DO      Reason for Consult: Cardiopulmonary arrest    Assessment/Plan:    Cardiopulmonary arrest: Patient with known pulmonary disease and normal LVEF.  Seems most consistent with respiratory arrest and nonarrhythmic.  Continue postarrest treatment protocol.Minimal troponin elevation argues against primary infarct as a cause for arrest.  Continue supportive care for now.  If patient neurologically recovers we can continue cardiac workup if needed.  2.  Disposition: Cardiology will sign off for now.    Peripheral IV 04/01/24 20 G Right Forearm (Active)   Site Assessment Clean;Dry;Intact 04/02/24 0400   Dressing Type Transparent 04/02/24 0400   Line Status Infusing 04/02/24 0400   Dressing Status Clean;Dry 04/02/24 0400   Number of days: 1       Peripheral IV 04/01/24 20 G Left Hand (Active)   Site Assessment Clean;Dry;Intact 04/02/24 0400   Dressing Type Transparent 04/02/24 0400   Line Status Infusing 04/02/24 0400   Dressing Status Clean;Dry 04/02/24 0400   Number of days: 1       Peripheral IV 04/01/24 20 G Left Antecubital (Active)   Site Assessment Clean;Dry;Intact 04/02/24 0400   Dressing Type Transparent 04/02/24 0400   Line Status Infusing 04/02/24 0400   Dressing Status Clean;Dry 04/02/24 0400   Number of days: 1       ETT  7.5 mm (Active)   Secured at (cm) 26 cm 04/02/24 0446   Measured from Lips 04/02/24 0446   Secured Location Center 04/02/24 0446   Secured by Commercial tube cheung 04/02/24 0446   Site Condition Cool;Dry 04/02/24 0446   Number of days: 1       Arterial Line 04/01/24 Left (Active)   Site Assessment Clean;Dry;Intact 04/02/24 0400   Line Status Pulsatile blood flow 04/02/24 0400   Art Line Waveform Appropriate;Square wave test performed;Whip 04/02/24 0400   Color/Movement/Sensation Capillary refill less than 3 sec;Distal pulses palpable 04/02/24 0400   Dressing Type Antimicrobial  patch;Transparent 04/02/24 0400   Dressing Status Clean;Dry;Occlusive 04/02/24 0400   Number of days: 1       NG/OG/Feeding Tube OG - [place] sump 16 Fr Left mouth (Active)   Output (mL) 0 mL 04/02/24 0700   Number of days: 1       Urethral Catheter 18 Fr. (Active)   Output (mL) 45 mL 04/02/24 0700   Number of days: 1             History Of Present Illness:    Gilberto Albarran is a 63 y.o. male presenting with cardiopulmonary arrest.  Patient is intubated and sedated.  I spoke with family and ICU staff..       Past Medical History:  He has a past medical history of Chronic obstructive pulmonary disease, unspecified (CMS/[facility]) (01/20/2022), Daily consumption of alcohol, Diplopia (12/04/2020), Esophageal reflux, Essential (primary) hypertension, Essential hypertension, History of rib fracture, Hyperlipidemia, mixed, Insomnia, MDD (major depressive disorder), Morbid obesity (CMS/[facility]), HARRIETT (obstructive sleep apnea), Parkinson disease, Slow transit constipation, Type 2 diabetes mellitus without complications (CMS/[facility]) (01/20/2022), Unspecified exotropia (12/04/2020), Vitamin B12 deficiency, and Vitamin D deficiency.    Past Surgical History:  He has a past surgical history that includes Other surgical history (09/04/2020).    Problem List:  Patient Active Problem List   Diagnosis    Asthma    HARRIETT treated with BiPAP    COPD exacerbation (CMS/[facility])    Diabetes mellitus (CMS/[facility])    Diplopia    Exotropia    HTN (hypertension)    Hyperlipidemia    Myopia    Ocular inflammation    Abnormal gait    Class 3 severe obesity due to excess calories with body mass index (BMI) of 40.0 to 44.9 in adult (CMS/[facility])    Parkinson disease (CMS/[facility])    Imbalance    At risk for falls    Deviated septum    Hypertrophy of nasal turbinates    Difficulty breathing    Nasal valve collapse    Chest pain    Fall    Type 2 diabetes mellitus (CMS/[facility])    Diastolic dysfunction    Daily consumption of alcohol    Acute hypoxic respiratory failure (CMS/[facility])     Cardiac arrest (CMS/HCC)       Social History:  He reports that he has quit smoking. His smoking use included cigarettes. He smoked an average of 1 pack per day. He does not have any smokeless tobacco history on file. He reports current alcohol use of about 5.0 standard drinks of alcohol per week. He reports that he does not use drugs.    Family History:  Family History   Problem Relation Name Age of Onset    Cataracts Mother      Other (HTN, DM) Mother      Parkinsonism Father      Colon cancer Other          Allergies:  Patient has no known allergies.    Inpatient Medications:  Scheduled medications   Medication Dose Route Frequency    budesonide  0.5 mg nebulization BID    pantoprazole  40 mg oral Daily before breakfast    Or    esomeprazole  40 mg nasoduodenal tube Daily before breakfast    Or    pantoprazole  40 mg intravenous Daily before breakfast    heparin (porcine)  7,500 Units subcutaneous q8h YANIQUE    insulin lispro  0-15 Units subcutaneous q4h    methylPREDNISolone sodium succinate (PF)  30 mg intravenous BID    montelukast  10 mg oral Nightly    oxygen   inhalation Continuous - Inhalation    piperacillin-tazobactam  4.5 g intravenous q8h     PRN medications   Medication    dextrose    dextrose    fentaNYL    glucagon    insulin regular    ipratropium-albuteroL    magnesium sulfate    magnesium sulfate    potassium chloride CR    Or    potassium chloride    potassium chloride     Continuous Medications   Medication Dose Last Rate    fentaNYL  0-300 mcg/hr 100 mcg/hr (04/02/24 0815)    propofol  5-50 mcg/kg/min 30 mcg/kg/min (04/02/24 0830)     Outpatient Medications:  Current Outpatient Medications   Medication Instructions    albuterol 90 mcg/actuation inhaler 2 puffs, inhalation, Every 4 hours PRN    amLODIPine (Norvasc) 10 mg tablet 1 tablet, oral, Daily    atenolol (TENORMIN) 100 mg, oral, Daily    atorvastatin (Lipitor) 10 mg tablet 1 tablet, oral, Nightly    azithromycin (Zithromax) 250 mg tablet  Take 2 tabs (500 mg) by mouth today, than 1 tab (250 mg) daily for 4 days.    buPROPion SR (WELLBUTRIN SR) 150 mg, oral, 2 times daily, Do not crush, chew, or split.    carbidopa-levodopa (Sinemet CR)  mg ER tablet 1 tablet, oral, Nightly, Do not crush, chew, or split.    carbidopa-levodopa (Sinemet)  mg tablet 1 tablet, oral, 3 times daily    citalopram (CeleXA) 40 mg tablet 1 tablet, oral, Daily    doxycycline (VIBRAMYCIN) 100 mg, oral, 2 times daily, Take with at least 8 ounces (large glass) of water, do not lie down for 30 minutes after    ergocalciferol (VITAMIN D-2) 50,000 Units, oral, Weekly, Saturday     famotidine (Pepcid) 40 mg tablet 1 tablet, oral, Daily, As directed    fluticasone propion-salmeteroL (Advair Diskus) 500-50 mcg/dose diskus inhaler 1 puff, inhalation, 2 times daily RT    furosemide (Lasix) 40 mg tablet 1 tablet, oral, 2 times daily    gabapentin (NEURONTIN) 800 mg, oral, 3 times daily    glipiZIDE (Glucotrol) 10 mg tablet 1 tablet, oral, 2 times daily    ketoconazole (NIZOral) 2 % shampoo Topical, Every other day    losartan (COZAAR) 100 mg, oral, Daily    melatonin 3 mg, oral, Nightly    metFORMIN (Glucophage) 1,000 mg tablet 1 tablet, oral, 2 times daily    methylPREDNISolone (Medrol Dospak) 4 mg tablets Take as directed on package.    montelukast (Singulair) 10 mg tablet 1 tablet, oral, Nightly    oxygen (O2) gas therapy 1 each, inhalation, Every 24 hours    Ozempic 1 mg, subcutaneous, Every 7 days, Wednesday    sildenafil (VIAGRA) 100 mg, oral, As needed    SITagliptin phosphate (Januvia) 100 mg tablet 1 tablet, oral, Daily         Physical Exam  Vitals reviewed.   Constitutional:       Interventions: He is sedated and intubated.   Eyes:      Pupils: Pupils are equal, round, and reactive to light.   Neck:      Vascular: No JVD.   Cardiovascular:      Rate and Rhythm: Normal rate and regular rhythm.      Pulses: Normal pulses.      Heart sounds: No murmur heard.     No  gallop.   Pulmonary:      Effort: No respiratory distress. He is intubated.      Breath sounds: Examination of the right-lower field reveals wheezing. Examination of the left-lower field reveals wheezing. Wheezing present. No rales.   Abdominal:      General: Abdomen is flat. There is no distension.      Palpations: Abdomen is soft.   Musculoskeletal:         General: No swelling.      Right lower leg: No edema.      Left lower leg: No edema.   Neurological:      Mental Status: He is unresponsive.            Last Labs:  CBC - 4/2/2024:  3:08 AM  12.1 9.4 182    30.5      CMP - 4/2/2024:  3:08 AM  8.3 5.8 25 --- 0.4   4.2 3.3 33 65      Troponin I, High Sensitivity   Date/Time Value Ref Range Status   04/01/2024 01:34  (HH) 0 - 20 ng/L Final     Comment:     Previous result verified on 4/1/2024 0720 on specimen/case [id number] called with component [facility] for procedure Troponin, High Sensitivity, 1 Hour with value 128 ng/L.   04/01/2024 06:08  (HH) 0 - 20 ng/L Final   04/01/2024 01:39 AM 25 (H) 0 - 20 ng/L Final     BNP   Date/Time Value Ref Range Status   04/01/2024 01:39  (H) 0 - 99 pg/mL Final   03/29/2024 12:05  (H) 0 - 99 pg/mL Final     Hemoglobin A1C   Date/Time Value Ref Range Status   04/01/2024 06:08 AM 5.2 see below % Final       EKG:   Encounter Date: 04/01/24   ECG 12 lead   Result Value    Ventricular Rate 59    Atrial Rate 59    LA Interval 166    QRS Duration 160    QT Interval 534    QTC Calculation(Bazett) 528    P Axis 40    R Axis -23    T Axis 32    QRS Count 10    Q Onset 210    P Onset 127    P Offset 196    T Offset 477    QTC Fredericia 531    Narrative    Sinus bradycardia  Right bundle branch block  Abnormal ECG  When compared with ECG of 01-APR-2024 12:56, (unconfirmed)  No significant change was found     ECHO: Transthoracic Echo (TTE) Complete    Result Date: 3/30/2024            [facility] [id number] [facility] [address]    Tel  [phone] Fax [phone] TRANSTHORACIC ECHOCARDIOGRAM REPORT  Patient Name:      PIPER CERVANTES         [place] Physician:    [id number] Feliberto Roberts MD Study Date:        3/30/2024             Ordering Provider:    [id number] ELISE FONG MRN/PID:           [id number]              Fellow: Accession#:        [id number]          Nurse: Date of Birth/Age: 1960 / 63 years Sonographer:          Cleo Villarreal RDCS Gender:            M                     Additional Staff: Height:            175.26 cm             Admit Date:           3/29/2024 Weight:            136.08 kg             Admission Status:     Inpatient -                                                                Priority                                                                discharge BSA / BMI:         2.45 m2 / 44.30 kg/m2 Department Location:  [address] Blood Pressure: 138 /65 mmHg Study Type:    TRANSTHORACIC ECHO (TTE) COMPLETE Diagnosis/ICD: Other ill defined heart diseases-I51.89 Indication:    Congestive Heart Failure CPT Codes:     Echo Complete w Full [id number] Patient History: Diabetes:          Yes BMI:               Overweight 25 - 30 Pertinent History: HTN, Hyperlipidemia, COPD and Dyspnea. HARRIETT; Parkinsons                    disease; no previous echocardiogram. Study Detail: The following Echo studies were performed: 2D, M-Mode, Doppler and               color flow. Technically challenging study due to body habitus and               patient lying in supine position.  PHYSICIAN INTERPRETATION: Left Ventricle: Left ventricular systolic function is normal, with an estimated ejection fraction of 55-60%. There are no regional wall motion abnormalities. The left ventricular cavity size is normal. Spectral Doppler shows a normal pattern of left ventricular diastolic filling. Left Atrium: The left atrium  was not well visualized. Right Ventricle: The right ventricle is mildly enlarged. Not well visualized. Right Atrium: The right atrium was not well visualized. Aortic Valve: The aortic valve appears structurally normal. There is no evidence of aortic valve regurgitation. The peak instantaneous gradient of the aortic valve is 9.4 mmHg. Mitral Valve: The mitral valve is normal in structure. There is trace mitral valve regurgitation. Tricuspid Valve: The tricuspid valve is structurally normal. There is trace tricuspid regurgitation. The estimated RVSP is 46 mm. Pulmonic Valve: The pulmonic valve is structurally normal. There is no indication of pulmonic valve regurgitation. Pericardium: There is no pericardial effusion noted. Aorta: The aortic root is normal.  CONCLUSIONS:  1. Left ventricular systolic function is normal with a 55-60% estimated ejection fraction.  2. The estimated RVSP is 46 mm. QUANTITATIVE DATA SUMMARY: 2D MEASUREMENTS:                           Normal Ranges: LAs:           5.54 cm    (2.7-4.0cm) IVSd:          0.97 cm    (0.6-1.1cm) LVPWd:         1.14 cm    (0.6-1.1cm) LVIDd:         6.19 cm    (3.9-5.9cm) LVIDs:         4.24 cm LV Mass Index: 114.1 g/m2 LV % FS        31.6 % LA VOLUME:                               Normal Ranges: LA Vol A4C:        72.5 ml    (22+/-6mL/m2) LA Vol Index A4C:  29.6 ml/m2 LA Area A4C:       23.0 cm2 LA Major Axis A4C: 6.2 cm LA Vol A4C:        65.3 ml AORTA MEASUREMENTS:                      Normal Ranges: Ao Sinus, d: 3.20 cm (2.1-3.5cm) Ao STJ, d:   2.70 cm (1.7-3.4cm) Asc Ao, d:   2.80 cm (2.1-3.4cm) LV SYSTOLIC FUNCTION BY 2D PLANIMETRY (MOD):                     Normal Ranges: EF-A4C View: 49.8 % (>=55%) LV DIASTOLIC FUNCTION:                               Normal Ranges: MV Peak E:        1.04 m/s    (0.7-1.2 m/s) MV Peak A:        0.41 m/s    (0.42-0.7 m/s) E/A Ratio:        2.51        (1.0-2.2) MV lateral e'     0.07 m/s PulmV Sys Michel:    31.29 cm/s PulmV  Blair Michel:   66.92 cm/s PulmV S/D Michel:    0.47 PulmV A Revs Michel: 19.62 cm/s PulmV A Revs Dur: 105.61 msec MITRAL VALVE:                 Normal Ranges: MV DT: 188 msec (150-240msec) AORTIC VALVE:                         Normal Ranges: AoV Vmax:      1.53 m/s (<=1.7m/s) AoV Peak P.4 mmHg (<20mmHg) LVOT Max Michel:  0.80 m/s (<=1.1m/s) LVOT VTI:      15.43 cm LVOT Diameter: 1.76 cm  (1.8-2.4cm) AoV Area,Vmax: 1.26 cm2 (2.5-4.5cm2)  RIGHT VENTRICLE: RV Basal 5.80 cm RV Mid   5.70 cm RV Major 7.4 cm TAPSE:   17.0 mm RV s'    0.11 m/s TRICUSPID VALVE/RVSP:                             Normal Ranges: Peak TR Velocity: 3.09 m/s RV Syst Pressure: 46.1 mmHg (< 30mmHg) PULMONIC VALVE:                         Normal Ranges: PV Accel Time: 128 msec (>120ms) PV Max Michel:    1.1 m/s  (0.6-0.9m/s) PV Max P.0 mmHg Pulmonary Veins: PulmV A Revs Dur: 105.61 msec PulmV A Revs Michel: 19.62 cm/s PulmV Blair Michel:   66.92 cm/s PulmV S/D Michel:    0.47 PulmV Sys Michel:    31.29 cm/s AORTA: Asc Ao Diam 2.83 cm  [id number] Feliberto Roberts MD Electronically signed on 3/30/2024 at 3:13:31 PM  ** Final **         Ahmet Blount MD

## 2024-04-03 ENCOUNTER — APPOINTMENT (OUTPATIENT)
Dept: CARDIOLOGY | Facility: HOSPITAL | Age: 64
DRG: 207 | End: 2024-04-03
Payer: COMMERCIAL

## 2024-04-03 ENCOUNTER — APPOINTMENT (OUTPATIENT)
Dept: RADIOLOGY | Facility: HOSPITAL | Age: 64
DRG: 207 | End: 2024-04-03
Payer: COMMERCIAL

## 2024-04-03 ENCOUNTER — APPOINTMENT (OUTPATIENT)
Dept: NEUROLOGY | Facility: CLINIC | Age: 64
End: 2024-04-03
Payer: COMMERCIAL

## 2024-04-03 ENCOUNTER — HOSPITAL ENCOUNTER (INPATIENT)
Dept: NEUROLOGY | Facility: HOSPITAL | Age: 64
Discharge: HOME | DRG: 207 | End: 2024-04-03
Payer: COMMERCIAL

## 2024-04-03 LAB
ALBUMIN SERPL BCP-MCNC: 3.5 G/DL (ref 3.4–5)
ALP SERPL-CCNC: 64 U/L (ref 33–136)
ALT SERPL W P-5'-P-CCNC: 33 U/L (ref 10–52)
ANION GAP SERPL CALC-SCNC: 12 MMOL/L (ref 10–20)
AST SERPL W P-5'-P-CCNC: 31 U/L (ref 9–39)
ATRIAL RATE: 64 BPM
ATRIAL RATE: 82 BPM
ATRIAL RATE: 83 BPM
BILIRUB SERPL-MCNC: 0.5 MG/DL (ref 0–1.2)
BUN SERPL-MCNC: 40 MG/DL (ref 6–23)
CALCIUM SERPL-MCNC: 8.6 MG/DL (ref 8.6–10.3)
CHLORIDE SERPL-SCNC: 102 MMOL/L (ref 98–107)
CO2 SERPL-SCNC: 30 MMOL/L (ref 21–32)
CREAT SERPL-MCNC: 1.2 MG/DL (ref 0.5–1.3)
EGFRCR SERPLBLD CKD-EPI 2021: 68 ML/MIN/1.73M*2
ERYTHROCYTE [DISTWIDTH] IN BLOOD BY AUTOMATED COUNT: 13.2 % (ref 11.5–14.5)
GLUCOSE BLD MANUAL STRIP-MCNC: 133 MG/DL (ref 74–99)
GLUCOSE BLD MANUAL STRIP-MCNC: 145 MG/DL (ref 74–99)
GLUCOSE BLD MANUAL STRIP-MCNC: 179 MG/DL (ref 74–99)
GLUCOSE BLD MANUAL STRIP-MCNC: 195 MG/DL (ref 74–99)
GLUCOSE BLD MANUAL STRIP-MCNC: 224 MG/DL (ref 74–99)
GLUCOSE BLD MANUAL STRIP-MCNC: 230 MG/DL (ref 74–99)
GLUCOSE SERPL-MCNC: 220 MG/DL (ref 74–99)
HCT VFR BLD AUTO: 34.9 % (ref 41–52)
HGB BLD-MCNC: 10.9 G/DL (ref 13.5–17.5)
MAGNESIUM SERPL-MCNC: 2.2 MG/DL (ref 1.6–2.4)
MCH RBC QN AUTO: 30.4 PG (ref 26–34)
MCHC RBC AUTO-ENTMCNC: 31.2 G/DL (ref 32–36)
MCV RBC AUTO: 97 FL (ref 80–100)
NRBC BLD-RTO: 0 /100 WBCS (ref 0–0)
P AXIS: 46 DEGREES
P AXIS: 59 DEGREES
P AXIS: 59 DEGREES
P OFFSET: 190 MS
P OFFSET: 190 MS
P OFFSET: 193 MS
P ONSET: 123 MS
P ONSET: 129 MS
P ONSET: 134 MS
PHOSPHATE SERPL-MCNC: 3.2 MG/DL (ref 2.5–4.9)
PLATELET # BLD AUTO: 205 X10*3/UL (ref 150–450)
POTASSIUM SERPL-SCNC: 4.1 MMOL/L (ref 3.5–5.3)
PR INTERVAL: 164 MS
PR INTERVAL: 174 MS
PR INTERVAL: 200 MS
PROT SERPL-MCNC: 6.3 G/DL (ref 6.4–8.2)
Q ONSET: 216 MS
Q ONSET: 216 MS
Q ONSET: 223 MS
QRS COUNT: 11 BEATS
QRS COUNT: 13 BEATS
QRS COUNT: 14 BEATS
QRS DURATION: 142 MS
QRS DURATION: 156 MS
QRS DURATION: 162 MS
QT INTERVAL: 420 MS
QT INTERVAL: 434 MS
QT INTERVAL: 496 MS
QTC CALCULATION(BAZETT): 493 MS
QTC CALCULATION(BAZETT): 507 MS
QTC CALCULATION(BAZETT): 511 MS
QTC FREDERICIA: 468 MS
QTC FREDERICIA: 481 MS
QTC FREDERICIA: 506 MS
R AXIS: -14 DEGREES
R AXIS: -26 DEGREES
R AXIS: -29 DEGREES
RBC # BLD AUTO: 3.59 X10*6/UL (ref 4.5–5.9)
SODIUM SERPL-SCNC: 140 MMOL/L (ref 136–145)
T AXIS: 67 DEGREES
T AXIS: 70 DEGREES
T AXIS: 76 DEGREES
T OFFSET: 433 MS
T OFFSET: 433 MS
T OFFSET: 464 MS
VENTRICULAR RATE: 64 BPM
VENTRICULAR RATE: 82 BPM
VENTRICULAR RATE: 83 BPM
WBC # BLD AUTO: 14.8 X10*3/UL (ref 4.4–11.3)

## 2024-04-03 PROCEDURE — 82947 ASSAY GLUCOSE BLOOD QUANT: CPT

## 2024-04-03 PROCEDURE — 80053 COMPREHEN METABOLIC PANEL: CPT

## 2024-04-03 PROCEDURE — 93005 ELECTROCARDIOGRAM TRACING: CPT

## 2024-04-03 PROCEDURE — 2500000002 HC RX 250 W HCPCS SELF ADMINISTERED DRUGS (ALT 637 FOR MEDICARE OP, ALT 636 FOR OP/ED)

## 2024-04-03 PROCEDURE — 93010 ELECTROCARDIOGRAM REPORT: CPT | Performed by: INTERNAL MEDICINE

## 2024-04-03 PROCEDURE — 84100 ASSAY OF PHOSPHORUS: CPT

## 2024-04-03 PROCEDURE — 99222 1ST HOSP IP/OBS MODERATE 55: CPT

## 2024-04-03 PROCEDURE — 94640 AIRWAY INHALATION TREATMENT: CPT

## 2024-04-03 PROCEDURE — 99291 CRITICAL CARE FIRST HOUR: CPT

## 2024-04-03 PROCEDURE — 2500000001 HC RX 250 WO HCPCS SELF ADMINISTERED DRUGS (ALT 637 FOR MEDICARE OP)

## 2024-04-03 PROCEDURE — 83735 ASSAY OF MAGNESIUM: CPT

## 2024-04-03 PROCEDURE — 94003 VENT MGMT INPAT SUBQ DAY: CPT

## 2024-04-03 PROCEDURE — 85027 COMPLETE CBC AUTOMATED: CPT

## 2024-04-03 PROCEDURE — 2020000001 HC ICU ROOM DAILY

## 2024-04-03 PROCEDURE — 74018 RADEX ABDOMEN 1 VIEW: CPT

## 2024-04-03 PROCEDURE — 74018 RADEX ABDOMEN 1 VIEW: CPT | Performed by: STUDENT IN AN ORGANIZED HEALTH CARE EDUCATION/TRAINING PROGRAM

## 2024-04-03 PROCEDURE — 2500000004 HC RX 250 GENERAL PHARMACY W/ HCPCS (ALT 636 FOR OP/ED)

## 2024-04-03 PROCEDURE — 37799 UNLISTED PX VASCULAR SURGERY: CPT

## 2024-04-03 RX ORDER — CEFTRIAXONE 2 G/50ML
2 INJECTION, SOLUTION INTRAVENOUS EVERY 24 HOURS
Status: DISCONTINUED | OUTPATIENT
Start: 2024-04-03 | End: 2024-04-06 | Stop reason: HOSPADM

## 2024-04-03 RX ORDER — LOSARTAN POTASSIUM 100 MG/1
100 TABLET ORAL DAILY
Status: DISCONTINUED | OUTPATIENT
Start: 2024-04-04 | End: 2024-04-06 | Stop reason: HOSPADM

## 2024-04-03 RX ORDER — INSULIN GLARGINE 100 [IU]/ML
10 INJECTION, SOLUTION SUBCUTANEOUS EVERY 24 HOURS
Status: DISCONTINUED | OUTPATIENT
Start: 2024-04-03 | End: 2024-04-04

## 2024-04-03 RX ADMIN — IPRATROPIUM BROMIDE AND ALBUTEROL SULFATE 3 ML: 2.5; .5 SOLUTION RESPIRATORY (INHALATION) at 17:03

## 2024-04-03 RX ADMIN — POLYETHYLENE GLYCOL 3350 17 G: 17 POWDER, FOR SOLUTION ORAL at 08:55

## 2024-04-03 RX ADMIN — BUDESONIDE 0.5 MG: 0.5 INHALANT RESPIRATORY (INHALATION) at 09:00

## 2024-04-03 RX ADMIN — ATENOLOL 100 MG: 50 TABLET ORAL at 08:56

## 2024-04-03 RX ADMIN — CEFTRIAXONE SODIUM 2 G: 2 INJECTION, SOLUTION INTRAVENOUS at 15:06

## 2024-04-03 RX ADMIN — MONTELUKAST 10 MG: 10 TABLET, FILM COATED ORAL at 20:09

## 2024-04-03 RX ADMIN — INSULIN LISPRO 6 UNITS: 100 INJECTION, SOLUTION INTRAVENOUS; SUBCUTANEOUS at 11:49

## 2024-04-03 RX ADMIN — FUROSEMIDE 20 MG: 10 INJECTION, SOLUTION INTRAMUSCULAR; INTRAVENOUS at 08:56

## 2024-04-03 RX ADMIN — METHYLPREDNISOLONE SODIUM SUCCINATE 30 MG: 40 INJECTION, POWDER, FOR SOLUTION INTRAMUSCULAR; INTRAVENOUS at 08:55

## 2024-04-03 RX ADMIN — INSULIN GLARGINE 10 UNITS: 100 INJECTION, SOLUTION SUBCUTANEOUS at 12:59

## 2024-04-03 RX ADMIN — INSULIN LISPRO 6 UNITS: 100 INJECTION, SOLUTION INTRAVENOUS; SUBCUTANEOUS at 08:55

## 2024-04-03 RX ADMIN — HEPARIN SODIUM 7500 UNITS: 5000 INJECTION INTRAVENOUS; SUBCUTANEOUS at 15:07

## 2024-04-03 RX ADMIN — INSULIN LISPRO 3 UNITS: 100 INJECTION, SOLUTION INTRAVENOUS; SUBCUTANEOUS at 16:10

## 2024-04-03 RX ADMIN — AMLODIPINE BESYLATE 10 MG: 10 TABLET ORAL at 08:55

## 2024-04-03 RX ADMIN — LOSARTAN POTASSIUM 100 MG: 100 TABLET, FILM COATED ORAL at 23:51

## 2024-04-03 RX ADMIN — INSULIN LISPRO 3 UNITS: 100 INJECTION, SOLUTION INTRAVENOUS; SUBCUTANEOUS at 20:09

## 2024-04-03 RX ADMIN — PANTOPRAZOLE SODIUM 40 MG: 40 TABLET, DELAYED RELEASE ORAL at 06:10

## 2024-04-03 RX ADMIN — DEXMEDETOMIDINE HYDROCHLORIDE 0.2 MCG/KG/HR: 400 INJECTION INTRAVENOUS at 02:20

## 2024-04-03 RX ADMIN — ATORVASTATIN CALCIUM 10 MG: 10 TABLET, FILM COATED ORAL at 20:09

## 2024-04-03 RX ADMIN — METHYLPREDNISOLONE SODIUM SUCCINATE 30 MG: 40 INJECTION, POWDER, FOR SOLUTION INTRAMUSCULAR; INTRAVENOUS at 20:09

## 2024-04-03 RX ADMIN — FUROSEMIDE 20 MG: 10 INJECTION, SOLUTION INTRAMUSCULAR; INTRAVENOUS at 20:09

## 2024-04-03 RX ADMIN — HEPARIN SODIUM 7500 UNITS: 5000 INJECTION INTRAVENOUS; SUBCUTANEOUS at 06:10

## 2024-04-03 RX ADMIN — INSULIN LISPRO 6 UNITS: 100 INJECTION, SOLUTION INTRAVENOUS; SUBCUTANEOUS at 00:32

## 2024-04-03 RX ADMIN — HEPARIN SODIUM 7500 UNITS: 5000 INJECTION INTRAVENOUS; SUBCUTANEOUS at 21:42

## 2024-04-03 RX ADMIN — PIPERACILLIN SODIUM AND TAZOBACTAM SODIUM 4.5 G: 4; .5 INJECTION, SOLUTION INTRAVENOUS at 06:10

## 2024-04-03 ASSESSMENT — PAIN - FUNCTIONAL ASSESSMENT
PAIN_FUNCTIONAL_ASSESSMENT: CPOT (CRITICAL CARE PAIN OBSERVATION TOOL)

## 2024-04-03 ASSESSMENT — COGNITIVE AND FUNCTIONAL STATUS - GENERAL
DRESSING REGULAR LOWER BODY CLOTHING: TOTAL
DRESSING REGULAR UPPER BODY CLOTHING: TOTAL
TOILETING: TOTAL
HELP NEEDED FOR BATHING: TOTAL
DAILY ACTIVITIY SCORE: 6
PERSONAL GROOMING: TOTAL
MOBILITY SCORE: 6
WALKING IN HOSPITAL ROOM: TOTAL
STANDING UP FROM CHAIR USING ARMS: TOTAL
EATING MEALS: TOTAL
MOVING FROM LYING ON BACK TO SITTING ON SIDE OF FLAT BED WITH BEDRAILS: TOTAL
MOVING TO AND FROM BED TO CHAIR: TOTAL
CLIMB 3 TO 5 STEPS WITH RAILING: TOTAL
TURNING FROM BACK TO SIDE WHILE IN FLAT BAD: TOTAL

## 2024-04-03 ASSESSMENT — PAIN SCALES - GENERAL: PAINLEVEL_OUTOF10: 0 - NO PAIN

## 2024-04-03 NOTE — PROGRESS NOTES
Gilberto Albarran is a 63 y.o. male on day 2 of admission presenting with Cardiac arrest (CMS/[facility]).    Subjective   Long goals of care discussion occurred with patient's spouse and MD last evening in which they discussed withdrawal of care if patient's neurological status remains poor. However, they are waiting at least 72 hours to really assess where he is post cardiac arrest. Palliative care just offering support and encouragement today. Will follow up again tomorrow for further goals of care discussion.       Objective     Physical Exam    Last Recorded Vitals  Blood pressure 117/62, pulse 60, temperature 36.3 °C (97.3 °F), temperature source Esophageal, resp. rate 24, weight 141 kg (309 lb 15.5 oz), SpO2 93 %.  Intake/Output last 3 Shifts:  I/O last 3 completed shifts:  In: 2564.8 (18.2 mL/kg) [I.V.:1465.2 (10.4 mL/kg); IV Piggyback:1099.6]  Out: 2410 (17.1 mL/kg) [Urine:2310 (0.5 mL/kg/hr); Emesis/NG output:100]  Weight: 140.6 kg     Assessment/Plan   Principal Problem:    Cardiac arrest (CMS/[facility])  Active Problems:    COPD exacerbation (CMS/[facility])    HTN (hypertension)    Hyperlipidemia    Class 3 severe obesity due to excess calories with body mass index (BMI) of 40.0 to 44.9 in adult (CMS/[facility])    Parkinson disease (CMS/[facility])    Type 2 diabetes mellitus (CMS/[facility])    Acute hypoxic respiratory failure (CMS/[facility])    Palliative care will continue to follow and offer support.    Nerissa Petty LCSW

## 2024-04-03 NOTE — SIGNIFICANT EVENT
Goals of Care note:       Lengthy convo held in icu conference room around 615pm with wife, Estefany Albarran, about goals of care and her concerns that the care we are providing her  is not what he would have wanted.     She is her 's poa. Unfortunately due to timing of arrest, EMS unaware of code status. When ems was called pt was hypoxic and altered at home with profound wheezing. Pt arrested in ambulance while receiving duonebs in transit to [facility] ED. wife unaware of arrest until contacted by ED providers and due to unfortunate timing of events he was already intubated. His code status is dnr dni, the patient made this decision himself when they updated their livig squires together weeks to months prior. The patient has detailed to her as well as in his living will that if he were to arrest AND deemed to have poor neurological prognosis and poor quality of life, he would want comfort care and to pursue hospice. I have told her as his physician we will honor his wishes as the patient has made the decision for himself already.    She is a surgical physician assistant, prefers to be told info bluntly and direct as well as to use data and numbers. On his admission we discussed to take his GOC one day at a time and that as his doctor I would ensure his goals as dnr dni will be honored.      He did not have a prolonged down time and cpr was initiated by ems immediately as was a witnessed arrest. Initially rhythm was pea. ROSC was obtained after about 4 rounds of cpr (received 3 doses total epi). Initial co2 noted to be in the 150s per Ed resident note and above 100 on the initial post arrest abg. Initial abg pH 7.09 co2 106 lactate 6.9. Highly suggestive of pea arrest from hypercapnia. His pH and lactate have normalized. We discussed that even though cpr was initiated quickly, the outcomes of pea arrest remain poor and survival within 6 months, even survival to hospital discharge is disma . He is also a  chronically ill gentleman and these chronic conditions including Parkinson's HFpEF, IDT2DM, chronic respiratory failure from Copd, morbid obesity  make him high risk for readmission and a long road to recovery. She understands this. We also discussed he will be a high risk of intubation for respiratory failure as does not regularly wear his cpap at home. He intermittently wore oxygen, which was a new start after his discharge home 3/31/24. This risk is null and void as he is a DNR DNI anyways. She did express understanding about the prognosis after PEA cardiac arrests.    She expressed guilt that he was intubated despite being a dnr dni. She specifically stated how gracious she was that ems resuscitated him so quickly and does not hold any ill squires. Just an unfortunate situation. He is currently dnr dni in the emr. She and Gilberto have specifically discussed tht he would NOT want a trach and PEG, he would want to chose comfort if does not improve from a neurological standpoint and was deemed to have a poor quality of life.    Our conversation specifically focused on her concerns about his neuro status and ability to make a choice about comfort care versus waiting. She expressed guilt as she is making the decision. While she feels supported from many family members, she feels that some may hold her accountable if she makes the wrong decision. I expressed understanding and that it is not her fault. I provided reassurance that her  has already made the decision in talks with her and on his living will to be dnr dni and he has specifically chosen comfort measures if his neurological outcome was deemed to be poor.      Due to timing of cooling and rewarming, and weaning of sedation and meds that may interfere with his mental status safely and adequately, he remains minimally responsive. As of 4/2 AM, Pupils are responsive to light minimally, has a cough, has a gag intermittently. Does not follow commands or  withdraw to pain. GCS is E1 V1M1 this afternoon.  He was on propofol and fentanyl for sedation, this has been turned off. He remains on precedex as breath stacks and becomes asynchronous with vent once sedation is fully off. We discussed that his neuro exam is not fully assessable given sedation requirements. We did discuss that his kidney and liver function do not suggest end organ damage and have recovered. He also uses alcohol daily and a component of withdrawal could be contributing. She states he never has withdrawal symptoms, seizures DTs, though based on her experience in medicine she understands this is contributing. She has expressed multiple times that she wanted to give him 24 hours before making a decision. I have told her unfortunately after cardiac arrests, 24 hours is not enough time for the medical team to provide adequate neuro prognostication. He will need at least 72 hours. He also has yet to receive a repeat CT or MRI. His ct done in Ed was completed a few hours after arrest and did not show concern for anoxic brain injury. I have consulted neuro for help with his neuro prognosis as their input would be especially helpful for her to hear.     We also discussed to hold off on enteral feeding for today. He is within 48 hours of admission and holding off will not drastically . Lastly, she did state she feels less guilty and more at peace after discussing where we are at with his hospital course and his latest neurological exam. Watchful waiting is one of the most difficult things family members have to do after a cardiac arrest.    Marizol Cunningham, DO

## 2024-04-03 NOTE — CARE PLAN
Problem: Skin  Goal: Participates in plan/prevention/treatment measures  Outcome: Progressing  Flowsheets (Taken 4/3/2024 1800)  Participates in plan/prevention/treatment measures: Elevate heels     Problem: Skin  Goal: Prevent/manage excess moisture  Flowsheets (Taken 4/3/2024 1800)  Prevent/manage excess moisture:   Cleanse incontinence/protect with barrier cream   Follow provider orders for dressing changes   Moisturize dry skin  Goal: Prevent/minimize sheer/friction injuries  Flowsheets (Taken 4/3/2024 1800)  Prevent/minimize sheer/friction injuries:   Turn/reposition every 2 hours/use positioning/transfer devices   Complete micro-shifts as needed if patient unable. Adjust patient position to relieve pressure points, not a full turn   HOB 30 degrees or less   Use pull sheet  Goal: Promote/optimize nutrition  Flowsheets (Taken 4/3/2024 1800)  Promote/optimize nutrition:   Offer water/supplements/favorite foods   Monitor/record intake including meals  Goal: Promote skin healing  Flowsheets (Taken 4/3/2024 1800)  Promote skin healing:   Protective dressings over bony prominences   Turn/reposition every 2 hours/use positioning/transfer devices     Problem: Indwelling Catheter Maintenance  Goal: I will have no complications from indwelling catheter  Flowsheets (Taken 4/3/2024 1800)  Resident will have no complications from indwelling catheter:   Nurse will assess for signs and symptoms of urinary tract infection, document them, and report to provider as needed   Nursing staff will ensure sunshine catheter leg strap is in use   Nursing staff will provide sunshine catheter care every shift and as needed   The patient's goals for the shift include      The clinical goals for the shift include Pt will remain HDS throughout shift

## 2024-04-03 NOTE — CONSULTS
Consults    Reason For Consult  Neuro prognostication after stroke    History Of Present Illness  Gilberto Albarran is a 63 y.o. male presenting after witnessed cardiac arrest by EMS. Patient had a recent hospital stay at this facility for COPD exacerbation. Was discharged on antibiotics and steroids. He was sitting at his about 1 day later and was feeling dyspenic, and decided to call EMS.  EMS arrived and was giving him a breathing treatment when he underwent cardiac arrest. PEA was shown, given 4 rounds of CPR, 3 doses of Epi, calcium, bicarb with eventual ROSC. He was intubated before he arrived to the hospital. Initial pH 7.09, CO2 106, lactate 7    CT Head showing no evidence of acute abnormality, however, when reviewing with neurology team, it seems that CT may be worse than what is reported. Upon examination, patient is not responsive to any commands or not making any purposeful movements. His eyes are open, however, there is no corneal or gag reflex. He does have a small pupillary reflex, however pupils are sluggish. Parker, OG, cooling catheter placed. Sedation turned off     Past Medical History  He has a past medical history of Chronic obstructive pulmonary disease, unspecified (CMS/[facility]) (01/20/2022), Daily consumption of alcohol, Diplopia (12/04/2020), Esophageal reflux, Essential (primary) hypertension, Essential hypertension, History of rib fracture, Hyperlipidemia, mixed, Insomnia, MDD (major depressive disorder), Morbid obesity (CMS/[facility]), HARRIETT (obstructive sleep apnea), Parkinson disease, Slow transit constipation, Type 2 diabetes mellitus without complications (CMS/[facility]) (01/20/2022), Unspecified exotropia (12/04/2020), Vitamin B12 deficiency, and Vitamin D deficiency.    Surgical History  He has a past surgical history that includes Other surgical history (09/04/2020).     Social History  He reports that he has quit smoking. His smoking use included cigarettes. He smoked an average of 1 pack per day. He  does not have any smokeless tobacco history on file. He reports current alcohol use of about 5.0 standard drinks of alcohol per week. He reports that he does not use drugs.    Family History  Family History   Problem Relation Name Age of Onset    Cataracts Mother      Other (HTN, DM) Mother      Parkinsonism Father      Colon cancer Other          Allergies  Patient has no known allergies.    Review of Systems  ROS negative unless stated otherwise in HPI     Physical Exam  Neurological:      Comments: Intubated. Absent corneal, gag reflex. Small pupillary reflex, sluggish pupils. Not responding to commands. Not making any purposeful movements. No pain response. GCS 3       Last Recorded Vitals  /62   Pulse 60   Temp 36.3 °C (97.3 °F) (Esophageal)   Resp 24   Wt 141 kg (309 lb 15.5 oz)   SpO2 93%     Relevant Results  Results for orders placed or performed during the hospital encounter of 04/01/24 (from the past 24 hour(s))   Blood Gas Arterial Full Panel   Result Value Ref Range    POCT pH, Arterial 7.29 (L) 7.38 - 7.42 pH    POCT pCO2, Arterial 66 (H) 38 - 42 mm Hg    POCT pO2, Arterial 92 85 - 95 mm Hg    POCT SO2, Arterial 98 94 - 100 %    POCT Oxy Hemoglobin, Arterial 96.1 94.0 - 98.0 %    POCT Hematocrit Calculated, Arterial 34.0 (L) 41.0 - 52.0 %    POCT Sodium, Arterial 135 (L) 136 - 145 mmol/L    POCT Potassium, Arterial 4.8 3.5 - 5.3 mmol/L    POCT Chloride, Arterial 102 98 - 107 mmol/L    POCT Ionized Calcium, Arterial 1.20 1.10 - 1.33 mmol/L    POCT Glucose, Arterial 221 (H) 74 - 99 mg/dL    POCT Lactate, Arterial 0.6 0.4 - 2.0 mmol/L    POCT Base Excess, Arterial 3.6 (H) -2.0 - 3.0 mmol/L    POCT HCO3 Calculated, Arterial 31.7 (H) 22.0 - 26.0 mmol/L    POCT Hemoglobin, Arterial 11.4 (L) 13.5 - 17.5 g/dL    POCT Anion Gap, Arterial 6 (L) 10 - 25 mmo/L    Patient Temperature      FiO2 60 %    Ventilator Mode A/C     Ventilator Rate 20 bpm    Tidal Volume 500 mL    Peep CHM2O 6.0 cm H2O    Site  of Arterial Puncture Arterial Line    POCT GLUCOSE   Result Value Ref Range    POCT Glucose 195 (H) 74 - 99 mg/dL   POCT GLUCOSE   Result Value Ref Range    POCT Glucose 233 (H) 74 - 99 mg/dL   Blood Gas Arterial Full Panel   Result Value Ref Range    POCT pH, Arterial 7.39 7.38 - 7.42 pH    POCT pCO2, Arterial 49 (H) 38 - 42 mm Hg    POCT pO2, Arterial 78 (L) 85 - 95 mm Hg    POCT SO2, Arterial 97 94 - 100 %    POCT Oxy Hemoglobin, Arterial 94.6 94.0 - 98.0 %    POCT Hematocrit Calculated, Arterial 34.0 (L) 41.0 - 52.0 %    POCT Sodium, Arterial 133 (L) 136 - 145 mmol/L    POCT Potassium, Arterial 4.5 3.5 - 5.3 mmol/L    POCT Chloride, Arterial 103 98 - 107 mmol/L    POCT Ionized Calcium, Arterial 1.20 1.10 - 1.33 mmol/L    POCT Glucose, Arterial 247 (H) 74 - 99 mg/dL    POCT Lactate, Arterial 1.1 0.4 - 2.0 mmol/L    POCT Base Excess, Arterial 3.9 (H) -2.0 - 3.0 mmol/L    POCT HCO3 Calculated, Arterial 29.7 (H) 22.0 - 26.0 mmol/L    POCT Hemoglobin, Arterial 11.4 (L) 13.5 - 17.5 g/dL    POCT Anion Gap, Arterial 5 (L) 10 - 25 mmo/L    Patient Temperature      FiO2 60 %    Ventilator Mode A/C     Ventilator Rate 24 bpm    Tidal Volume 500 mL    Peep CHM2O 6.0 cm H2O   POCT GLUCOSE   Result Value Ref Range    POCT Glucose 234 (H) 74 - 99 mg/dL   POCT GLUCOSE   Result Value Ref Range    POCT Glucose 133 (H) 74 - 99 mg/dL   CBC   Result Value Ref Range    WBC 14.8 (H) 4.4 - 11.3 x10*3/uL    nRBC 0.0 0.0 - 0.0 /100 WBCs    RBC 3.59 (L) 4.50 - 5.90 x10*6/uL    Hemoglobin 10.9 (L) 13.5 - 17.5 g/dL    Hematocrit 34.9 (L) 41.0 - 52.0 %    MCV 97 80 - 100 fL    MCH 30.4 26.0 - 34.0 pg    MCHC 31.2 (L) 32.0 - 36.0 g/dL    RDW 13.2 11.5 - 14.5 %    Platelets 205 150 - 450 x10*3/uL   Comprehensive Metabolic Panel   Result Value Ref Range    Glucose 220 (H) 74 - 99 mg/dL    Sodium 140 136 - 145 mmol/L    Potassium 4.1 3.5 - 5.3 mmol/L    Chloride 102 98 - 107 mmol/L    Bicarbonate 30 21 - 32 mmol/L    Anion Gap 12 10 - 20 mmol/L     Urea Nitrogen 40 (H) 6 - 23 mg/dL    Creatinine 1.20 0.50 - 1.30 mg/dL    eGFR 68 >60 mL/min/1.73m*2    Calcium 8.6 8.6 - 10.3 mg/dL    Albumin 3.5 3.4 - 5.0 g/dL    Alkaline Phosphatase 64 33 - 136 U/L    Total Protein 6.3 (L) 6.4 - 8.2 g/dL    AST 31 9 - 39 U/L    Bilirubin, Total 0.5 0.0 - 1.2 mg/dL    ALT 33 10 - 52 U/L   Magnesium   Result Value Ref Range    Magnesium 2.20 1.60 - 2.40 mg/dL   Phosphorus   Result Value Ref Range    Phosphorus 3.2 2.5 - 4.9 mg/dL   POCT GLUCOSE   Result Value Ref Range    POCT Glucose 224 (H) 74 - 99 mg/dL   POCT GLUCOSE   Result Value Ref Range    POCT Glucose 230 (H) 74 - 99 mg/dL     ECG 12 lead    Result Date: 4/2/2024  ** * Pediatric ECG analysis * ** Sinus rhythm with Possible Premature atrial complexes with Aberrant conduction Left axis deviation Right bundle branch block PEDIATRIC ANALYSIS - MANUAL COMPARISON REQUIRED When compared with ECG of 29-MAR-2024 14:55, PREVIOUS ECG IS PRESENT    XR chest 1 view    Result Date: 4/2/2024  Interpreted By:  Stephon Juarez, STUDY: XR CHEST 1 VIEW;  4/2/2024 10:04 am   INDICATION: Signs/Symptoms:re-assess, on vent.   COMPARISON: 04/01/2024   ACCESSION NUMBER(S): [id number]   ORDERING CLINICIAN: NIKKI PULIDO   FINDINGS: Support tubes are unchanged. Diffuse bilateral airspace consolidation not significantly changed. Cardiomegaly. Pleural effusions not excluded. Pulmonary vascular congestion.       No significant change compared to yesterday.   MACRO: None   Signed by: Stephon Juarez 4/2/2024 11:08 AM Dictation workstation:   [id number]    ECG 12 lead    Result Date: 4/2/2024  Sinus bradycardia Right bundle branch block Abnormal ECG When compared with ECG of 01-APR-2024 12:56, (unconfirmed) No significant change was found    ECG 12 Lead    Result Date: 4/1/2024  Normal sinus rhythm Right bundle branch block T wave abnormality, consider lateral ischemia Abnormal ECG When compared with ECG of 29-MAR-2024 14:55, (unconfirmed) Criteria for  Anteroseptal infarct are no longer Present Nonspecific T wave abnormality now evident in Inferior leads    CT head wo IV contrast    Result Date: 4/1/2024  Interpreted By:  Isaiah Reynolds, STUDY: CT HEAD WO IV CONTRAST;  4/1/2024 4:40 am   INDICATION: Signs/Symptoms:cardiac arrest. myoclonic jerking.   COMPARISON: 10/25/2023   ACCESSION NUMBER(S): [id number]   ORDERING CLINICIAN: FIFI CHAPARRO   TECHNIQUE: Noncontrast axial CT scan of head was performed. Angled reformats in brain and bone windows were generated. The images were reviewed in bone, brain, blood and soft tissue windows.   FINDINGS: Global volume loss and moderate chronic small vessel ischemic change as seen on prior brain MRI.   No findings of acute hemorrhage. No mass effect. No midline shift. Stable ventricular size   If persistent concern, consider MRI. Mastoid air cells and paranasal sinuses are clear for mild ethmoid sinus mucosal thickening       Senescent changes. No evidence of acute intracranial abnormality. If persistent concern, further evaluation is advised with MRI   No evidence of intracranial hemorrhage or displaced skull fracture.   MACRO: None   Signed by: Isaiah Reynolds 4/1/2024 6:13 AM Dictation workstation:   [id number]    XR chest 1 view    Result Date: 4/1/2024  STUDY: Chest Radiograph;  4/1/2024 INDICATION: Chest pain. COMPARISON: 3/29/2024 XR Chest ACCESSION NUMBER(S): [id number] ORDERING CLINICIAN: FIFI CHAPARRO TECHNIQUE:  Frontal chest was obtained at 01:52 hours. FINDINGS: Endotracheal tube tip is approximately 4.5 cm above the mee. Nasogastric tube tip identified to the distal esophagus. Mild cardiac enlargement.  Patchy bilateral airspace disease.  No discernible pleural effusion or pneumothorax.    Patchy bilateral airspace disease.  Congestive failure vs. pneumonia. Correlate clinically. Endotracheal tube tip is approximately 4.5 cm above the mee.  Nasogastric tube tip identified to the distal esophagus.   Continued advancement recommended. Signed by Gilberto Parham MD    XR abdomen 1 view    Result Date: 4/1/2024  Interpreted By:  Michael Brito, STUDY: XR ABDOMEN 1 VIEW; ;  4/1/2024 2:25 am   INDICATION: Signs/Symptoms:og placement.   COMPARISON: 03/29/2024   ACCESSION NUMBER(S): [id number]   ORDERING CLINICIAN: FIIF CHAPARRO   FINDINGS: Single AP radiograph of the right upper abdomen. NG/OG tube tip projects over the distal esophagus. Streaky bibasilar airspace opacities. Upper abdomen is unremarkable. Remote right anterior rib fractures.       1. NG/OG tube tip projects over the distal esophagus. Consider advancement.   Signed by: Michael Brito 4/1/2024 3:06 AM Dictation workstation:   [id number]    ECG 12 lead    Result Date: 3/31/2024  Sinus rhythm with occasional Premature ventricular complexes Right bundle branch block Abnormal ECG When compared with ECG of 27-NOV-2017 11:13, Premature ventricular complexes are now Present QRS axis Shifted left    ECG 12 lead    Result Date: 3/31/2024  Normal sinus rhythm Right bundle branch block Anteroseptal infarct , age undetermined Abnormal ECG When compared with ECG of 29-MAR-2024 12:10, (unconfirmed) Premature ventricular complexes are no longer Present Anteroseptal infarct is now Present    Transthoracic Echo (TTE) Complete    Result Date: 3/30/2024            [facility] [place] [address] [id number]    Tel [phone] Fax [phone] TRANSTHORACIC ECHOCARDIOGRAM REPORT  Patient Name:      GILBERTO Gimenez Physician:    [id number] Feliberto Roberts MD Study Date:        3/30/2024             Ordering Provider:    [id number] ELISE FONG MRN/PID:           [id number]              Fellow: Accession#:        [id number]          Nurse: Date of Birth/Age: 1960 / 63 years Sonographer:          Cleo  Phil [facility] Gender:            M                     Additional Staff: Height:            175.26 cm             Admit Date:           3/29/2024 Weight:            136.08 kg             Admission Status:     Inpatient -                                                                Priority                                                                discharge BSA / BMI:         2.45 m2 / 44.30 kg/m2 Department Location:  [address] Blood Pressure: 138 /65 mmHg Study Type:    TRANSTHORACIC ECHO (TTE) COMPLETE Diagnosis/ICD: Other ill defined heart diseases-I51.89 Indication:    Congestive Heart Failure CPT Codes:     Echo Complete w Full [id number] Patient History: Diabetes:          Yes BMI:               Overweight 25 - 30 Pertinent History: HTN, Hyperlipidemia, COPD and Dyspnea. HARRIETT; Parkinsons                    disease; no previous echocardiogram. Study Detail: The following Echo studies were performed: 2D, M-Mode, Doppler and               color flow. Technically challenging study due to body habitus and               patient lying in supine position.  PHYSICIAN INTERPRETATION: Left Ventricle: Left ventricular systolic function is normal, with an estimated ejection fraction of 55-60%. There are no regional wall motion abnormalities. The left ventricular cavity size is normal. Spectral Doppler shows a normal pattern of left ventricular diastolic filling. Left Atrium: The left atrium was not well visualized. Right Ventricle: The right ventricle is mildly enlarged. Not well visualized. Right Atrium: The right atrium was not well visualized. Aortic Valve: The aortic valve appears structurally normal. There is no evidence of aortic valve regurgitation. The peak instantaneous gradient of the aortic valve is 9.4 mmHg. Mitral Valve: The mitral valve is normal in structure. There is trace mitral valve regurgitation. Tricuspid Valve: The tricuspid valve is structurally normal. There is trace tricuspid  regurgitation. The estimated RVSP is 46 mm. Pulmonic Valve: The pulmonic valve is structurally normal. There is no indication of pulmonic valve regurgitation. Pericardium: There is no pericardial effusion noted. Aorta: The aortic root is normal.  CONCLUSIONS:  1. Left ventricular systolic function is normal with a 55-60% estimated ejection fraction.  2. The estimated RVSP is 46 mm. QUANTITATIVE DATA SUMMARY: 2D MEASUREMENTS:                           Normal Ranges: LAs:           5.54 cm    (2.7-4.0cm) IVSd:          0.97 cm    (0.6-1.1cm) LVPWd:         1.14 cm    (0.6-1.1cm) LVIDd:         6.19 cm    (3.9-5.9cm) LVIDs:         4.24 cm LV Mass Index: 114.1 g/m2 LV % FS        31.6 % LA VOLUME:                               Normal Ranges: LA Vol A4C:        72.5 ml    (22+/-6mL/m2) LA Vol Index A4C:  29.6 ml/m2 LA Area A4C:       23.0 cm2 LA Major Axis A4C: 6.2 cm LA Vol A4C:        65.3 ml AORTA MEASUREMENTS:                      Normal Ranges: Ao Sinus, d: 3.20 cm (2.1-3.5cm) Ao STJ, d:   2.70 cm (1.7-3.4cm) Asc Ao, d:   2.80 cm (2.1-3.4cm) LV SYSTOLIC FUNCTION BY 2D PLANIMETRY (MOD):                     Normal Ranges: EF-A4C View: 49.8 % (>=55%) LV DIASTOLIC FUNCTION:                               Normal Ranges: MV Peak E:        1.04 m/s    (0.7-1.2 m/s) MV Peak A:        0.41 m/s    (0.42-0.7 m/s) E/A Ratio:        2.51        (1.0-2.2) MV lateral e'     0.07 m/s PulmV Sys Michel:    31.29 cm/s PulmV Blair Michel:   66.92 cm/s PulmV S/D Michel:    0.47 PulmV A Revs Michel: 19.62 cm/s PulmV A Revs Dur: 105.61 msec MITRAL VALVE:                 Normal Ranges: MV DT: 188 msec (150-240msec) AORTIC VALVE:                         Normal Ranges: AoV Vmax:      1.53 m/s (<=1.7m/s) AoV Peak P.4 mmHg (<20mmHg) LVOT Max Michel:  0.80 m/s (<=1.1m/s) LVOT VTI:      15.43 cm LVOT Diameter: 1.76 cm  (1.8-2.4cm) AoV Area,Vmax: 1.26 cm2 (2.5-4.5cm2)  RIGHT VENTRICLE: RV Basal 5.80 cm RV Mid   5.70 cm RV Major 7.4 cm TAPSE:   17.0 mm RV  s'    0.11 m/s TRICUSPID VALVE/RVSP:                             Normal Ranges: Peak TR Velocity: 3.09 m/s RV Syst Pressure: 46.1 mmHg (< 30mmHg) PULMONIC VALVE:                         Normal Ranges: PV Accel Time: 128 msec (>120ms) PV Max Michel:    1.1 m/s  (0.6-0.9m/s) PV Max P.0 mmHg Pulmonary Veins: PulmV A Revs Dur: 105.61 msec PulmV A Revs Michel: 19.62 cm/s PulmV Blair Michel:   66.92 cm/s PulmV S/D Michel:    0.47 PulmV Sys Michel:    31.29 cm/s AORTA: Asc Ao Diam 2.83 cm  [id number] Feliberto Roberts MD Electronically signed on 3/30/2024 at 3:13:31 PM  ** Final **     CT angio chest for pulmonary embolism    Result Date: 3/29/2024  STUDY: CT Angiogram of the Chest; 2024, 1:44 PM. INDICATION: Hypoxia ,shortness of breath, elevate D-dimer. COMPARISON: CXR: 2024. ACCESSION NUMBER(S): [id number] ORDERING CLINICIAN: DARCY CLEVELAND TECHNIQUE:  CTA of the chest was performed with intravenous contrast. Images are reviewed and processed at a workstation according to the CT angiogram protocol with 3-D and/or MIP post processing imaging generated.  Omnipaque 350 60 mL was administered intravenously. Automated mA/kV exposure control was utilized and patient examination was performed in strict accordance with principles of ALARA. FINDINGS: Pulmonary arteries are adequately opacified without acute or chronic filling defects.  The thoracic aorta is normal in course and caliber without dissection or aneurysm. Atherosclerosis of the coronary arteries is present.  The heart is mildly enlarged without pericardial effusion.  Prominent mediastinal lymph nodes are present, likely reactive. Representative subcarinal lymph node measures 1.8 cm short axis dimension. Mild atelectatic changes are present.  Trace bilateral pleural effusions are present. There is no pneumothorax.  The airways are patent. No consolidation, interstitial disease, or suspicious nodules. Low-attenuation lesion about the anterior aspect of the LEFT  hepatic lobe measures 3.2 cm in size, most likely cysts. There are no acute fractures.  No suspicious bony lesions.  Senescent changes of the thoracic spine are present.      1. There is no evidence of pulmonary embolus. 2. Mild cardiomegaly. Atherosclerosis of the coronary arteries is present. Trace bilateral pleural effusions are present. 3. Mediastinal adenopathy, likely reactive. Signed by Danny Gtz II, MD    XR chest 1 view    Result Date: 3/29/2024  STUDY: Chest Radiograph;  3/29/2024 12:34 PM. INDICATION: Chest pain. COMPARISON: None Available. ACCESSION NUMBER(S): [id number] ORDERING CLINICIAN: DARCY CLEVELAND TECHNIQUE:  Frontal chest was obtained at 12:34 hours. FINDINGS: CARDIOMEDIASTINAL SILHOUETTE: Cardiomediastinal silhouette is normal in size and configuration.  LUNGS: Lungs are clear.  ABDOMEN: No remarkable upper abdominal findings.  BONES: No acute osseous changes.  Postoperative changes of the right shoulder.    No acute cardiopulmonary abnormality. Signed by Sae Garcia MD     Assessment/Plan     63 year old male with multiple medical problems presents after witnessed cardiac arrest.     1) Anoxic brain injury s/p cardiac arrest    Plan:    1) Imaging reviewed, extensive discussion with family. Explained that due to not having primitive reflexes, that prognosis may not be good. However, has not been 72 hours. Will order vEEG to look for seizure activity. Will wait for results and discuss with family the next steps      Staffed with Dr. Barahona,   Monster Vela MD  Internal Medicine, PGY-3

## 2024-04-03 NOTE — PROGRESS NOTES
Critical Care Daily Progress        Subjective   Patient is a 63 y.o. male admitted on 4/1/2024  1:28 AM with the following indication(s) for ICU care s/p cardiac arrest likely in the setting of hypercapnia, currently on mechanically ventilated.      Interval History/Brief Hospital Course:  63-year-old male presenting to the hospital status postcardiac arrest in setting of hypercapnia complicated by COPD exacerbation/HARRIETT on BiPAP intubated en route per EMS.  Remains in the ICU but is not yet to awaken completed cooling and rewarming per ICU protocol given the post arrest, concern for cerebral anoxia neurology following plan for vEEG.    Overnight Events: Continue with sedation holiday today noting the patient has been off sedation for approx. 12 hours with no clinical change in mentation, GCS remains 3.    Complaints: Intubated and not able to endorse complaints.      Scheduled Medications:   amLODIPine, 10 mg, oral, Daily  atenolol, 100 mg, oral, Daily  atorvastatin, 10 mg, oral, Nightly  budesonide, 0.5 mg, nebulization, BID  cefTRIAXone, 2 g, intravenous, q24h  pantoprazole, 40 mg, oral, Daily before breakfast   Or  esomeprazole, 40 mg, nasoduodenal tube, Daily before breakfast   Or  pantoprazole, 40 mg, intravenous, Daily before breakfast  furosemide, 20 mg, intravenous, BID  heparin (porcine), 7,500 Units, subcutaneous, q8h YANIQUE  insulin glargine, 10 Units, subcutaneous, q24h  insulin lispro, 0-15 Units, subcutaneous, q4h  methylPREDNISolone sodium succinate (PF), 30 mg, intravenous, BID  montelukast, 10 mg, oral, Nightly  polyethylene glycol, 17 g, oral, Daily         Continuous Medications:   dexmedeTOMIDine, 0.1-1.5 mcg/kg/hr, Last Rate: 0.2 mcg/kg/hr (04/03/24 1621)         PRN Medications:   PRN medications: acetaminophen **OR** acetaminophen, dextrose, dextrose, glucagon, ipratropium-albuteroL, magnesium sulfate, magnesium sulfate, oxygen, potassium chloride CR **OR** potassium chloride, potassium  chloride    Objective   Vitals:  Most Recent:  Vitals:    04/03/24 0905   BP:    Pulse:    Resp: 24   Temp:    SpO2: 93%       24hr Min/Max:  Temp  Min: 36.3 °C (97.3 °F)  Max: 37.2 °C (99 °F)  Pulse  Min: 60  Max: 84  Resp  Min: 16  Max: 36  SpO2  Min: 93 %  Max: 100 %          Vent settings:  Vent Mode: Assist control/Volume control plus  FiO2 (%):  [50 %-60 %] 50 %  S RR:  [16-24] 24  S VT:  [500 mL] 500 mL  PEEP/CPAP (cm H2O):  [6 cm H20-8 cm H20] 8 cm H20  MAP (cm H2O):  [16-21] 16    Hemodynamic parameters for last 24 hours:       I/O:    Intake/Output Summary (Last 24 hours) at 4/3/2024 1212  Last data filed at 4/3/2024 0800  Gross per 24 hour   Intake 92.78 ml   Output 1855 ml   Net -1762.22 ml       Physical Exam:   Physical Exam   Physical Exam  Vitals and nursing note reviewed.   Constitutional:       Comments: Intubated, unresponsive   HENT:      Head: Normocephalic and atraumatic.      Mouth/Throat:      Mouth: Mucous membranes are dry.      Comments: Endotracheal tube in place  Eyes:      General: No scleral icterus.     Comments: pupils fixed and nonreactive bilaterally   Cardiovascular:      Rate and Rhythm: Normal rate and regular rhythm.      Pulses: Normal pulses.      Heart sounds: Normal heart sounds.   Pulmonary:      Comments: Mechanically ventilated  Abdominal:      General: Abdomen is flat.   Musculoskeletal:      Cervical back: Neck supple.      Right lower leg: Edema present.      Left lower leg: Edema present.      Comments: Chronic venous stasis dermatitis bilateral lower extremities   Skin:     General: Skin is warm and dry.      Capillary Refill: Capillary refill takes less than 2 seconds.   Neurological:      Comments: Unresponsive, GCS 3         Lab/Radiology/Diagnostic Review:  Results for orders placed or performed during the hospital encounter of 04/01/24 (from the past 24 hour(s))   Blood Gas Arterial Full Panel   Result Value Ref Range    POCT pH, Arterial 7.29 (L) 7.38 - 7.42  pH    POCT pCO2, Arterial 66 (H) 38 - 42 mm Hg    POCT pO2, Arterial 92 85 - 95 mm Hg    POCT SO2, Arterial 98 94 - 100 %    POCT Oxy Hemoglobin, Arterial 96.1 94.0 - 98.0 %    POCT Hematocrit Calculated, Arterial 34.0 (L) 41.0 - 52.0 %    POCT Sodium, Arterial 135 (L) 136 - 145 mmol/L    POCT Potassium, Arterial 4.8 3.5 - 5.3 mmol/L    POCT Chloride, Arterial 102 98 - 107 mmol/L    POCT Ionized Calcium, Arterial 1.20 1.10 - 1.33 mmol/L    POCT Glucose, Arterial 221 (H) 74 - 99 mg/dL    POCT Lactate, Arterial 0.6 0.4 - 2.0 mmol/L    POCT Base Excess, Arterial 3.6 (H) -2.0 - 3.0 mmol/L    POCT HCO3 Calculated, Arterial 31.7 (H) 22.0 - 26.0 mmol/L    POCT Hemoglobin, Arterial 11.4 (L) 13.5 - 17.5 g/dL    POCT Anion Gap, Arterial 6 (L) 10 - 25 mmo/L    Patient Temperature      FiO2 60 %    Ventilator Mode A/C     Ventilator Rate 20 bpm    Tidal Volume 500 mL    Peep CHM2O 6.0 cm H2O    Site of Arterial Puncture Arterial Line    POCT GLUCOSE   Result Value Ref Range    POCT Glucose 195 (H) 74 - 99 mg/dL   POCT GLUCOSE   Result Value Ref Range    POCT Glucose 233 (H) 74 - 99 mg/dL   Blood Gas Arterial Full Panel   Result Value Ref Range    POCT pH, Arterial 7.39 7.38 - 7.42 pH    POCT pCO2, Arterial 49 (H) 38 - 42 mm Hg    POCT pO2, Arterial 78 (L) 85 - 95 mm Hg    POCT SO2, Arterial 97 94 - 100 %    POCT Oxy Hemoglobin, Arterial 94.6 94.0 - 98.0 %    POCT Hematocrit Calculated, Arterial 34.0 (L) 41.0 - 52.0 %    POCT Sodium, Arterial 133 (L) 136 - 145 mmol/L    POCT Potassium, Arterial 4.5 3.5 - 5.3 mmol/L    POCT Chloride, Arterial 103 98 - 107 mmol/L    POCT Ionized Calcium, Arterial 1.20 1.10 - 1.33 mmol/L    POCT Glucose, Arterial 247 (H) 74 - 99 mg/dL    POCT Lactate, Arterial 1.1 0.4 - 2.0 mmol/L    POCT Base Excess, Arterial 3.9 (H) -2.0 - 3.0 mmol/L    POCT HCO3 Calculated, Arterial 29.7 (H) 22.0 - 26.0 mmol/L    POCT Hemoglobin, Arterial 11.4 (L) 13.5 - 17.5 g/dL    POCT Anion Gap, Arterial 5 (L) 10 - 25 mmo/L     Patient Temperature      FiO2 60 %    Ventilator Mode A/C     Ventilator Rate 24 bpm    Tidal Volume 500 mL    Peep CHM2O 6.0 cm H2O   POCT GLUCOSE   Result Value Ref Range    POCT Glucose 234 (H) 74 - 99 mg/dL   POCT GLUCOSE   Result Value Ref Range    POCT Glucose 133 (H) 74 - 99 mg/dL   CBC   Result Value Ref Range    WBC 14.8 (H) 4.4 - 11.3 x10*3/uL    nRBC 0.0 0.0 - 0.0 /100 WBCs    RBC 3.59 (L) 4.50 - 5.90 x10*6/uL    Hemoglobin 10.9 (L) 13.5 - 17.5 g/dL    Hematocrit 34.9 (L) 41.0 - 52.0 %    MCV 97 80 - 100 fL    MCH 30.4 26.0 - 34.0 pg    MCHC 31.2 (L) 32.0 - 36.0 g/dL    RDW 13.2 11.5 - 14.5 %    Platelets 205 150 - 450 x10*3/uL   Comprehensive Metabolic Panel   Result Value Ref Range    Glucose 220 (H) 74 - 99 mg/dL    Sodium 140 136 - 145 mmol/L    Potassium 4.1 3.5 - 5.3 mmol/L    Chloride 102 98 - 107 mmol/L    Bicarbonate 30 21 - 32 mmol/L    Anion Gap 12 10 - 20 mmol/L    Urea Nitrogen 40 (H) 6 - 23 mg/dL    Creatinine 1.20 0.50 - 1.30 mg/dL    eGFR 68 >60 mL/min/1.73m*2    Calcium 8.6 8.6 - 10.3 mg/dL    Albumin 3.5 3.4 - 5.0 g/dL    Alkaline Phosphatase 64 33 - 136 U/L    Total Protein 6.3 (L) 6.4 - 8.2 g/dL    AST 31 9 - 39 U/L    Bilirubin, Total 0.5 0.0 - 1.2 mg/dL    ALT 33 10 - 52 U/L   Magnesium   Result Value Ref Range    Magnesium 2.20 1.60 - 2.40 mg/dL   Phosphorus   Result Value Ref Range    Phosphorus 3.2 2.5 - 4.9 mg/dL   POCT GLUCOSE   Result Value Ref Range    POCT Glucose 224 (H) 74 - 99 mg/dL   POCT GLUCOSE   Result Value Ref Range    POCT Glucose 230 (H) 74 - 99 mg/dL     ECG 12 lead    Result Date: 4/2/2024  ** * Pediatric ECG analysis * ** Sinus rhythm with Possible Premature atrial complexes with Aberrant conduction Left axis deviation Right bundle branch block PEDIATRIC ANALYSIS - MANUAL COMPARISON REQUIRED When compared with ECG of 29-MAR-2024 14:55, PREVIOUS ECG IS PRESENT    XR chest 1 view    Result Date: 4/2/2024  Interpreted By:  Stephon Juarez, STUDY: XR CHEST 1 VIEW;   4/2/2024 10:04 am   INDICATION: Signs/Symptoms:re-assess, on vent.   COMPARISON: 04/01/2024   ACCESSION NUMBER(S): [id number]   ORDERING CLINICIAN: NIKKI PULIDO   FINDINGS: Support tubes are unchanged. Diffuse bilateral airspace consolidation not significantly changed. Cardiomegaly. Pleural effusions not excluded. Pulmonary vascular congestion.       No significant change compared to yesterday.   MACRO: None   Signed by: Stephon Juarez 4/2/2024 11:08 AM Dictation workstation:   [id number]    ECG 12 lead    Result Date: 4/2/2024  Sinus bradycardia Right bundle branch block Abnormal ECG When compared with ECG of 01-APR-2024 12:56, (unconfirmed) No significant change was found    ECG 12 Lead    Result Date: 4/1/2024  Normal sinus rhythm Right bundle branch block T wave abnormality, consider lateral ischemia Abnormal ECG When compared with ECG of 29-MAR-2024 14:55, (unconfirmed) Criteria for Anteroseptal infarct are no longer Present Nonspecific T wave abnormality now evident in Inferior leads    CT head wo IV contrast    Result Date: 4/1/2024  Interpreted By:  Isaiah Reynolds, STUDY: CT HEAD WO IV CONTRAST;  4/1/2024 4:40 am   INDICATION: Signs/Symptoms:cardiac arrest. myoclonic jerking.   COMPARISON: 10/25/2023   ACCESSION NUMBER(S): [id number]   ORDERING CLINICIAN: FIFI CHAPARRO   TECHNIQUE: Noncontrast axial CT scan of head was performed. Angled reformats in brain and bone windows were generated. The images were reviewed in bone, brain, blood and soft tissue windows.   FINDINGS: Global volume loss and moderate chronic small vessel ischemic change as seen on prior brain MRI.   No findings of acute hemorrhage. No mass effect. No midline shift. Stable ventricular size   If persistent concern, consider MRI. Mastoid air cells and paranasal sinuses are clear for mild ethmoid sinus mucosal thickening       Senescent changes. No evidence of acute intracranial abnormality. If persistent concern, further evaluation is advised  with MRI   No evidence of intracranial hemorrhage or displaced skull fracture.   MACRO: None   Signed by: Isaiah Reynolds 4/1/2024 6:13 AM Dictation workstation:   [id number]    XR chest 1 view    Result Date: 4/1/2024  STUDY: Chest Radiograph;  4/1/2024 INDICATION: Chest pain. COMPARISON: 3/29/2024 XR Chest ACCESSION NUMBER(S): [id number] ORDERING CLINICIAN: FIFI CHAPARRO TECHNIQUE:  Frontal chest was obtained at 01:52 hours. FINDINGS: Endotracheal tube tip is approximately 4.5 cm above the mee. Nasogastric tube tip identified to the distal esophagus. Mild cardiac enlargement.  Patchy bilateral airspace disease.  No discernible pleural effusion or pneumothorax.    Patchy bilateral airspace disease.  Congestive failure vs. pneumonia. Correlate clinically. Endotracheal tube tip is approximately 4.5 cm above the mee.  Nasogastric tube tip identified to the distal esophagus.  Continued advancement recommended. Signed by Gilberto Parham MD    XR abdomen 1 view    Result Date: 4/1/2024  Interpreted By:  Michael Brito, STUDY: XR ABDOMEN 1 VIEW; ;  4/1/2024 2:25 am   INDICATION: Signs/Symptoms:og placement.   COMPARISON: 03/29/2024   ACCESSION NUMBER(S): [id number]   ORDERING CLINICIAN: FIFI CHAPARRO   FINDINGS: Single AP radiograph of the right upper abdomen. NG/OG tube tip projects over the distal esophagus. Streaky bibasilar airspace opacities. Upper abdomen is unremarkable. Remote right anterior rib fractures.       1. NG/OG tube tip projects over the distal esophagus. Consider advancement.   Signed by: Michael Brito 4/1/2024 3:06 AM Dictation workstation:   [id number]    ECG 12 lead    Result Date: 3/31/2024  Sinus rhythm with occasional Premature ventricular complexes Right bundle branch block Abnormal ECG When compared with ECG of 27-NOV-2017 11:13, Premature ventricular complexes are now Present QRS axis Shifted left    ECG 12 lead    Result Date: 3/31/2024  Normal sinus rhythm Right bundle  branch block Anteroseptal infarct , age undetermined Abnormal ECG When compared with ECG of 29-MAR-2024 12:10, (unconfirmed) Premature ventricular complexes are no longer Present Anteroseptal infarct is now Present    Transthoracic Echo (TTE) Complete    Result Date: 3/30/2024            [facility] [place] [address], [facility] [address]    Tel [phone] Fax [phone] TRANSTHORACIC ECHOCARDIOGRAM REPORT  Patient Name:      PIPER Gimenez Physician:    [id number] Feliberto Roberts MD Study Date:        3/30/2024             Ordering Provider:    [id number] ELISE FONG MRN/PID:           [id number]              Fellow: Accession#:        [id number]          Nurse: Date of Birth/Age: 1960 / 63 years Sonographer:          Cleo Villarreal RD Gender:            M                     Additional Staff: Height:            175.26 cm             Admit Date:           3/29/2024 Weight:            136.08 kg             Admission Status:     Inpatient -                                                                Priority                                                                discharge BSA / BMI:         2.45 m2 / 44.30 kg/m2 Department Location:  [address] Blood Pressure: 138 /65 mmHg Study Type:    TRANSTHORACIC ECHO (TTE) COMPLETE Diagnosis/ICD: Other ill defined heart diseases-I51.89 Indication:    Congestive Heart Failure CPT Codes:     Echo Complete w Full [id number] Patient History: Diabetes:          Yes BMI:               Overweight 25 - 30 Pertinent History: HTN, Hyperlipidemia, COPD and Dyspnea. HARRIETT; Parkinsons                    disease; no previous echocardiogram. Study Detail: The following Echo studies were performed: 2D, M-Mode, Doppler and               color flow. Technically challenging study due to body habitus and               patient lying  in supine position.  PHYSICIAN INTERPRETATION: Left Ventricle: Left ventricular systolic function is normal, with an estimated ejection fraction of 55-60%. There are no regional wall motion abnormalities. The left ventricular cavity size is normal. Spectral Doppler shows a normal pattern of left ventricular diastolic filling. Left Atrium: The left atrium was not well visualized. Right Ventricle: The right ventricle is mildly enlarged. Not well visualized. Right Atrium: The right atrium was not well visualized. Aortic Valve: The aortic valve appears structurally normal. There is no evidence of aortic valve regurgitation. The peak instantaneous gradient of the aortic valve is 9.4 mmHg. Mitral Valve: The mitral valve is normal in structure. There is trace mitral valve regurgitation. Tricuspid Valve: The tricuspid valve is structurally normal. There is trace tricuspid regurgitation. The estimated RVSP is 46 mm. Pulmonic Valve: The pulmonic valve is structurally normal. There is no indication of pulmonic valve regurgitation. Pericardium: There is no pericardial effusion noted. Aorta: The aortic root is normal.  CONCLUSIONS:  1. Left ventricular systolic function is normal with a 55-60% estimated ejection fraction.  2. The estimated RVSP is 46 mm. QUANTITATIVE DATA SUMMARY: 2D MEASUREMENTS:                           Normal Ranges: LAs:           5.54 cm    (2.7-4.0cm) IVSd:          0.97 cm    (0.6-1.1cm) LVPWd:         1.14 cm    (0.6-1.1cm) LVIDd:         6.19 cm    (3.9-5.9cm) LVIDs:         4.24 cm LV Mass Index: 114.1 g/m2 LV % FS        31.6 % LA VOLUME:                               Normal Ranges: LA Vol A4C:        72.5 ml    (22+/-6mL/m2) LA Vol Index A4C:  29.6 ml/m2 LA Area A4C:       23.0 cm2 LA Major Axis A4C: 6.2 cm LA Vol A4C:        65.3 ml AORTA MEASUREMENTS:                      Normal Ranges: Ao Sinus, d: 3.20 cm (2.1-3.5cm) Ao STJ, d:   2.70 cm (1.7-3.4cm) Asc Ao, d:   2.80 cm (2.1-3.4cm) LV SYSTOLIC  FUNCTION BY 2D PLANIMETRY (MOD):                     Normal Ranges: EF-A4C View: 49.8 % (>=55%) LV DIASTOLIC FUNCTION:                               Normal Ranges: MV Peak E:        1.04 m/s    (0.7-1.2 m/s) MV Peak A:        0.41 m/s    (0.42-0.7 m/s) E/A Ratio:        2.51        (1.0-2.2) MV lateral e'     0.07 m/s PulmV Sys Michel:    31.29 cm/s PulmV Blair Michel:   66.92 cm/s PulmV S/D Michel:    0.47 PulmV A Revs Michel: 19.62 cm/s PulmV A Revs Dur: 105.61 msec MITRAL VALVE:                 Normal Ranges: MV DT: 188 msec (150-240msec) AORTIC VALVE:                         Normal Ranges: AoV Vmax:      1.53 m/s (<=1.7m/s) AoV Peak P.4 mmHg (<20mmHg) LVOT Max Michel:  0.80 m/s (<=1.1m/s) LVOT VTI:      15.43 cm LVOT Diameter: 1.76 cm  (1.8-2.4cm) AoV Area,Vmax: 1.26 cm2 (2.5-4.5cm2)  RIGHT VENTRICLE: RV Basal 5.80 cm RV Mid   5.70 cm RV Major 7.4 cm TAPSE:   17.0 mm RV s'    0.11 m/s TRICUSPID VALVE/RVSP:                             Normal Ranges: Peak TR Velocity: 3.09 m/s RV Syst Pressure: 46.1 mmHg (< 30mmHg) PULMONIC VALVE:                         Normal Ranges: PV Accel Time: 128 msec (>120ms) PV Max Michel:    1.1 m/s  (0.6-0.9m/s) PV Max P.0 mmHg Pulmonary Veins: PulmV A Revs Dur: 105.61 msec PulmV A Revs Michel: 19.62 cm/s PulmV Blair Michel:   66.92 cm/s PulmV S/D Michel:    0.47 PulmV Sys Michel:    31.29 cm/s AORTA: Asc Ao Diam 2.83 cm  [id number] Feliberto Roberts MD Electronically signed on 3/30/2024 at 3:13:31 PM  ** Final **     CT angio chest for pulmonary embolism    Result Date: 3/29/2024  STUDY: CT Angiogram of the Chest; 2024, 1:44 PM. INDICATION: Hypoxia ,shortness of breath, elevate D-dimer. COMPARISON: CXR: 2024. ACCESSION NUMBER(S): [id number] ORDERING CLINICIAN: DARCY CLEVELAND TECHNIQUE:  CTA of the chest was performed with intravenous contrast. Images are reviewed and processed at a workstation according to the CT angiogram protocol with 3-D and/or MIP post processing imaging generated.   Omnipaque 350 60 mL was administered intravenously. Automated mA/kV exposure control was utilized and patient examination was performed in strict accordance with principles of ALARA. FINDINGS: Pulmonary arteries are adequately opacified without acute or chronic filling defects.  The thoracic aorta is normal in course and caliber without dissection or aneurysm. Atherosclerosis of the coronary arteries is present.  The heart is mildly enlarged without pericardial effusion.  Prominent mediastinal lymph nodes are present, likely reactive. Representative subcarinal lymph node measures 1.8 cm short axis dimension. Mild atelectatic changes are present.  Trace bilateral pleural effusions are present. There is no pneumothorax.  The airways are patent. No consolidation, interstitial disease, or suspicious nodules. Low-attenuation lesion about the anterior aspect of the LEFT hepatic lobe measures 3.2 cm in size, most likely cysts. There are no acute fractures.  No suspicious bony lesions.  Senescent changes of the thoracic spine are present.      1. There is no evidence of pulmonary embolus. 2. Mild cardiomegaly. Atherosclerosis of the coronary arteries is present. Trace bilateral pleural effusions are present. 3. Mediastinal adenopathy, likely reactive. Signed by Danny Gtz II, MD    XR chest 1 view    Result Date: 3/29/2024  STUDY: Chest Radiograph;  3/29/2024 12:34 PM. INDICATION: Chest pain. COMPARISON: None Available. ACCESSION NUMBER(S): [id number] ORDERING CLINICIAN: DARCY CLEVELAND TECHNIQUE:  Frontal chest was obtained at 12:34 hours. FINDINGS: CARDIOMEDIASTINAL SILHOUETTE: Cardiomediastinal silhouette is normal in size and configuration.  LUNGS: Lungs are clear.  ABDOMEN: No remarkable upper abdominal findings.  BONES: No acute osseous changes.  Postoperative changes of the right shoulder.    No acute cardiopulmonary abnormality. Signed by Sae Garcia MD              This patient is intubated   Reason for  patient to remain intubated today? they are unable to protect their airway             Assessment/Plan   Principal Problem:    Cardiac arrest (CMS/HCC)  Active Problems:    COPD exacerbation (CMS/[facility])    HTN (hypertension)    Hyperlipidemia    Class 3 severe obesity due to excess calories with body mass index (BMI) of 40.0 to 44.9 in adult (CMS/[facility])    Parkinson disease (CMS/[facility])    Type 2 diabetes mellitus (CMS/[facility])    Acute hypoxic respiratory failure (CMS/[facility])      Assessment:    Neuro:   #Parkinson's disease  #MDD  #Acute encephalopathy likely in the setting of anoxic brain injury  -History: none  -Home meds: Sinemet  mg tablet 3 times daily, Wellbutrin  mg 12-hour tablet twice daily (hold)  -GCS: E 1 V 1 M 1  -Pain: none  -Sedation: Off-wean Precedex off and DO NOT re-infuse precedex for agitation overnight.   -CIWA/COWS: none  -[facility] ICU  -Neurology consulted for no improvement in neuro-exam concern for anoxic brain injury, recommendation for vEEG.     Cardiac:   #S/p cardiac arrest due to hypercapnia s/p cooling  #Hyperlipidemia  #Essential hypertension  - Goals: [MAP> 65, HR ]  - Home meds: Norvasc 10 mg tablet daily, atenolol 100 mg tablet daily, atorvastatin 10 mg tablet daily, Cozaar 100 mg tablet daily (hold)  - Last echo: 3/30/2024 LVEF 55-60%, estimated RSVP 46 mm, aortic root normal no regional wall motion abnormalities LV cavity is normal size  - Pressors: None  - Continue with Lasix 20mg IV BID and continue with strict I&Os    Pulmonary:   #Acute hypoxic respiratory failure due to COPD E on home O2 with hypercapnia   #HARRIETT on CPAP nightly, non-compliant  -Home meds: Advair Diskus 500-50 mcg, Singulair 10 mg tablet daily, albuterol 90 rescue  Vent Mode: Assist control/Volume control plus  FiO2 (%):  [50 %-60 %] 50 %  S RR:  [16-24] 24  S VT:  [500 mL] 500 mL  PEEP/CPAP (cm H2O):  [6 cm H20-8 cm H20] 8 cm H20  MAP (cm H2O):  [16-21] 16   Continuous pulse oximetry   O2 PRN to maintain  SpO2 > 94%, wean as tolerated  -Imaging: Repeat CXR from 4/2 pulmonary vascular congestion and cardiomegaly diffuse airspace consolidations.  -Continue DuoNebs every 6 hours with as needed every 2 hours for symptoms of worsening wheezing hypoxia.  Continue home Singulair and Pulmicort.  -Continue Solu-Medrol 30 mg twice daily  -Will de-escalate Zosyn to Rocephin today course to complete 7 days, EOT 4/7    Gastrointestinal:   #GERD  -Home meds: None  - Diet: No enteral feeds per family request, will provide oral hydration via water flushes 200mL q4hrs.   - Supplementation: none  - Prophylaxis: 40 IV Protonix daily  - Bowel regimen: 17 g MiraLAX daily   - Last BM: Last BM Date: 04/01/24  -reported approx. 700cc output for OG tube, with no BM concern for possible ileus ordered KUB .     Renal:   #OUSMANE on CKD resolved  - Daily RFP,Mg,Phos  - History: none  - Home meds: Initiated 20 IV of Lasix daily, Continue to hold Januvia  - indwelling Parker with strict I/O  Net IO Since Admission: 743.66 mL [04/03/24 1212]  Results from last 72 hours   Lab Units 04/03/24  0414 04/02/24  0308   BUN mg/dL 40* 40*   CREATININE mg/dL 1.20 1.43*       - Fluids: None mL/hr  - Electrolytes: Replete per protocol, goal K>4 Phos >3 Mg >2    Endocrine:   #IDDM type II  -Home meds: 50 units Lantus, glipizide, metformin, Ozempic, Januvia (all being held)   -sliding scale: Moderate SSI #3, Accu-Cheks every 4 hours with hypoglycemic protocols in place  -Patient sBG continues to be above goal added 10 units of basal Lantus on 4/3/2024  Results from last 7 days   Lab Units 04/03/24  1132 04/03/24  0725 04/03/24  0414 04/03/24  0411 04/02/24  2326 04/02/24  1952 04/02/24  1623 04/02/24  0309 04/02/24  0308   POCT GLUCOSE mg/dL 230* 224*  --  133* 234* 233* 195*   < >  --    GLUCOSE mg/dL  --   --  220*  --   --   --   --   --  198*    < > = values in this interval not displayed.      -BG < 180 goal    Hematology:   - Daily CBC, coags  -History:  None  -Home meds: None  Results from last 7 days   Lab Units 24  0414 24  0308   HEMOGLOBIN g/dL 10.9* 9.4*   HEMATOCRIT % 34.9* 30.5*   PLATELETS AUTO x10*3/uL 205 182     - DVT Prophylaxis: Heparin DVT prophylaxis, SCDs    Infectious Disease:   #Acute leukocytosis likely reactive given recent systemic steroid therapy  -History of recurrent COPD exacerbation was treated with systemic steroids and antibiotics most recently 3/29  Results from last 7 days   Lab Units 24  0414 24  0308   WBC AUTO x10*3/uL 14.8* 12.1*     Temp (24hrs), Av.8 °C (98.3 °F), Min:36.3 °C (97.3 °F), Max:37.2 °C (99 °F)     -Abx: Zosyn de-escalated to Rocephin 2 g today to complete a 7-day course end of therapy is   -Cultures: Blood cultures negative to date, urine strep and Legionella negative, MRSA negative tracheal aspirate ordered on     Musculoskeletal:   #Hx/o Right great toe wound  -Home meds: none  -PT OT when appropriate  -Heel protectors     Integumentary:   #Chronic venous stasis dermatitis bilateral lower extremities stable  -Home meds: None    LDA:  Arterial Line 24 Left (Active)   Placement Date/Time: 24 0545   Orientation: Left  Insertion attempts: 1   Number of days: 2       ETT  7.5 mm (Active)   Placement Date/Time: 24 0200   Placed by External Staff?: EMS  Single Lumen Tube Size: 7.5 mm  Cuffed: Yes  Location: Oral   Number of days: 2       Urethral Catheter 18 Fr. (Active)   Placement Date/Time: 24 0143   Placed by: geronimo  Tube Size (Fr.): 18 Fr.  Catheter Balloon Size: 10 mL  Urine Returned: Yes   Number of days: 2       NG/OG/Feeding Tube OG - Neihart sump 16 Fr Left mouth (Active)   Placement Date/Time: 24 014   Placed by: kylah  Type of Tube: (c) NG/OG Tube  Tube Type: OG - Neihart sump  NG/OG Tube Size: 16 Fr  Tube Location: Left mouth   Number of days: 2         CODE STATUS: DNR    Disposition: Admitted to the ICU s/p cardiac arrest requiring cooling,  currently on ventilator, prognosis remains guarded given sedation holiday without improvement in neuro exam, neurology following plan for vEEG.     ABCDEF Checklist  Analgesia: Spontaneous awakening trial to be pursued if clinically appropriate. RASS goal reviewed  Breathing: Spontaneous breathing trial to be pursued if clinically appropriate. Mechanical power of assisted ventilation reviewed  Choice of analgesia/sedation: Analgesic and sedative agents adjusted per clinical context.   Delirium assessed by CAM, will avoid exacerbating factors  Early mobility and exercise: Physical and occupational therapy engaged  Family: Plan of care, overall trajectory of patient shared with family. Questions elicited and answered as appropriate.       Due to the high probability of life threatening clinical decompensation, the patient required critical care time evaluating and managing this patient.  Critical care time included obtaining a history, examining the patient, ordering and reviewing studies, discussing, developing, and implementing a management plan, evaluating the patient's response to treatment, and discussion with other care team providers. I saw and evaluated the patient myself.  Critical care time was performed exclusive of billable procedures.        Agustín Reed, DO PGY-2  Critical Care Medicine

## 2024-04-03 NOTE — CARE PLAN
The patient's goals for the shift include      The clinical goals for the shift include to remain normothermic    Over the shift, the patient did make progress toward the following goals.

## 2024-04-04 ENCOUNTER — APPOINTMENT (OUTPATIENT)
Dept: RADIOLOGY | Facility: HOSPITAL | Age: 64
DRG: 207 | End: 2024-04-04
Payer: COMMERCIAL

## 2024-04-04 ENCOUNTER — APPOINTMENT (OUTPATIENT)
Dept: CARDIOLOGY | Facility: HOSPITAL | Age: 64
DRG: 207 | End: 2024-04-04
Payer: COMMERCIAL

## 2024-04-04 LAB
ALBUMIN SERPL BCP-MCNC: 3.1 G/DL (ref 3.4–5)
ALBUMIN SERPL BCP-MCNC: 3.3 G/DL (ref 3.4–5)
ALP SERPL-CCNC: 57 U/L (ref 33–136)
ALP SERPL-CCNC: 67 U/L (ref 33–136)
ALT SERPL W P-5'-P-CCNC: 23 U/L (ref 10–52)
ALT SERPL W P-5'-P-CCNC: 30 U/L (ref 10–52)
AMYLASE SERPL-CCNC: 63 U/L (ref 29–103)
AMYLASE SERPL-CCNC: 72 U/L (ref 29–103)
ANION GAP SERPL CALC-SCNC: 11 MMOL/L (ref 10–20)
ANION GAP SERPL CALC-SCNC: 11 MMOL/L (ref 10–20)
APPEARANCE UR: CLEAR
AST SERPL W P-5'-P-CCNC: 40 U/L (ref 9–39)
AST SERPL W P-5'-P-CCNC: 63 U/L (ref 9–39)
BILIRUB DIRECT SERPL-MCNC: 0.1 MG/DL (ref 0–0.3)
BILIRUB SERPL-MCNC: 0.4 MG/DL (ref 0–1.2)
BILIRUB SERPL-MCNC: 0.4 MG/DL (ref 0–1.2)
BILIRUB UR STRIP.AUTO-MCNC: NEGATIVE MG/DL
BUN SERPL-MCNC: 37 MG/DL (ref 6–23)
BUN SERPL-MCNC: 41 MG/DL (ref 6–23)
CALCIUM SERPL-MCNC: 8.4 MG/DL (ref 8.6–10.3)
CALCIUM SERPL-MCNC: 8.8 MG/DL (ref 8.6–10.3)
CHLORIDE SERPL-SCNC: 100 MMOL/L (ref 98–107)
CHLORIDE SERPL-SCNC: 102 MMOL/L (ref 98–107)
CO2 SERPL-SCNC: 32 MMOL/L (ref 21–32)
CO2 SERPL-SCNC: 32 MMOL/L (ref 21–32)
COLOR UR: YELLOW
CREAT SERPL-MCNC: 1.03 MG/DL (ref 0.5–1.3)
CREAT SERPL-MCNC: 1.06 MG/DL (ref 0.5–1.3)
EGFRCR SERPLBLD CKD-EPI 2021: 79 ML/MIN/1.73M*2
EGFRCR SERPLBLD CKD-EPI 2021: 82 ML/MIN/1.73M*2
ERYTHROCYTE [DISTWIDTH] IN BLOOD BY AUTOMATED COUNT: 13.1 % (ref 11.5–14.5)
GLUCOSE BLD MANUAL STRIP-MCNC: 143 MG/DL (ref 74–99)
GLUCOSE BLD MANUAL STRIP-MCNC: 178 MG/DL (ref 74–99)
GLUCOSE BLD MANUAL STRIP-MCNC: 189 MG/DL (ref 74–99)
GLUCOSE BLD MANUAL STRIP-MCNC: 194 MG/DL (ref 74–99)
GLUCOSE BLD MANUAL STRIP-MCNC: 204 MG/DL (ref 74–99)
GLUCOSE BLD MANUAL STRIP-MCNC: 211 MG/DL (ref 74–99)
GLUCOSE BLD MANUAL STRIP-MCNC: 220 MG/DL (ref 74–99)
GLUCOSE SERPL-MCNC: 165 MG/DL (ref 74–99)
GLUCOSE SERPL-MCNC: 215 MG/DL (ref 74–99)
GLUCOSE UR STRIP.AUTO-MCNC: NEGATIVE MG/DL
HCT VFR BLD AUTO: 33.6 % (ref 41–52)
HGB BLD-MCNC: 10.6 G/DL (ref 13.5–17.5)
KETONES UR STRIP.AUTO-MCNC: NEGATIVE MG/DL
LACTATE SERPL-SCNC: 0.8 MMOL/L (ref 0.4–2)
LEUKOCYTE ESTERASE UR QL STRIP.AUTO: NEGATIVE
LIPASE SERPL-CCNC: 12 U/L (ref 9–82)
LIPASE SERPL-CCNC: 13 U/L (ref 9–82)
MAGNESIUM SERPL-MCNC: 1.9 MG/DL (ref 1.6–2.4)
MAGNESIUM SERPL-MCNC: 1.95 MG/DL (ref 1.6–2.4)
MCH RBC QN AUTO: 30.3 PG (ref 26–34)
MCHC RBC AUTO-ENTMCNC: 31.5 G/DL (ref 32–36)
MCV RBC AUTO: 96 FL (ref 80–100)
NITRITE UR QL STRIP.AUTO: NEGATIVE
NRBC BLD-RTO: 0 /100 WBCS (ref 0–0)
PH UR STRIP.AUTO: 7 [PH]
PHOSPHATE SERPL-MCNC: 2.9 MG/DL (ref 2.5–4.9)
PHOSPHATE SERPL-MCNC: 3.4 MG/DL (ref 2.5–4.9)
PLATELET # BLD AUTO: 214 X10*3/UL (ref 150–450)
POTASSIUM SERPL-SCNC: 4 MMOL/L (ref 3.5–5.3)
POTASSIUM SERPL-SCNC: 4 MMOL/L (ref 3.5–5.3)
PROT SERPL-MCNC: 5.7 G/DL (ref 6.4–8.2)
PROT SERPL-MCNC: 6.3 G/DL (ref 6.4–8.2)
PROT UR STRIP.AUTO-MCNC: NEGATIVE MG/DL
RBC # BLD AUTO: 3.5 X10*6/UL (ref 4.5–5.9)
RBC # UR STRIP.AUTO: NEGATIVE /UL
SARS-COV-2 RNA RESP QL NAA+PROBE: NOT DETECTED
SODIUM SERPL-SCNC: 139 MMOL/L (ref 136–145)
SODIUM SERPL-SCNC: 141 MMOL/L (ref 136–145)
SP GR UR STRIP.AUTO: 1.01
UROBILINOGEN UR STRIP.AUTO-MCNC: <2 MG/DL
WBC # BLD AUTO: 10.5 X10*3/UL (ref 4.4–11.3)

## 2024-04-04 PROCEDURE — 80053 COMPREHEN METABOLIC PANEL: CPT

## 2024-04-04 PROCEDURE — 95714 VEEG EA 12-26 HR UNMNTR: CPT

## 2024-04-04 PROCEDURE — 2500000002 HC RX 250 W HCPCS SELF ADMINISTERED DRUGS (ALT 637 FOR MEDICARE OP, ALT 636 FOR OP/ED)

## 2024-04-04 PROCEDURE — 82150 ASSAY OF AMYLASE: CPT

## 2024-04-04 PROCEDURE — 99236 HOSP IP/OBS SAME DATE HI 85: CPT

## 2024-04-04 PROCEDURE — 95720 EEG PHY/QHP EA INCR W/VEEG: CPT | Performed by: PSYCHIATRY & NEUROLOGY

## 2024-04-04 PROCEDURE — 81003 URINALYSIS AUTO W/O SCOPE: CPT

## 2024-04-04 PROCEDURE — 2020000001 HC ICU ROOM DAILY

## 2024-04-04 PROCEDURE — 82947 ASSAY GLUCOSE BLOOD QUANT: CPT

## 2024-04-04 PROCEDURE — 83735 ASSAY OF MAGNESIUM: CPT

## 2024-04-04 PROCEDURE — 80048 BASIC METABOLIC PNL TOTAL CA: CPT | Mod: CCI

## 2024-04-04 PROCEDURE — 87635 SARS-COV-2 COVID-19 AMP PRB: CPT

## 2024-04-04 PROCEDURE — 94799 UNLISTED PULMONARY SVC/PX: CPT

## 2024-04-04 PROCEDURE — 37799 UNLISTED PX VASCULAR SURGERY: CPT

## 2024-04-04 PROCEDURE — 82248 BILIRUBIN DIRECT: CPT

## 2024-04-04 PROCEDURE — C9113 INJ PANTOPRAZOLE SODIUM, VIA: HCPCS

## 2024-04-04 PROCEDURE — 83690 ASSAY OF LIPASE: CPT

## 2024-04-04 PROCEDURE — 2500000001 HC RX 250 WO HCPCS SELF ADMINISTERED DRUGS (ALT 637 FOR MEDICARE OP)

## 2024-04-04 PROCEDURE — 83605 ASSAY OF LACTIC ACID: CPT

## 2024-04-04 PROCEDURE — 2500000004 HC RX 250 GENERAL PHARMACY W/ HCPCS (ALT 636 FOR OP/ED)

## 2024-04-04 PROCEDURE — 93005 ELECTROCARDIOGRAM TRACING: CPT

## 2024-04-04 PROCEDURE — 85027 COMPLETE CBC AUTOMATED: CPT

## 2024-04-04 PROCEDURE — 94640 AIRWAY INHALATION TREATMENT: CPT

## 2024-04-04 PROCEDURE — 84100 ASSAY OF PHOSPHORUS: CPT

## 2024-04-04 PROCEDURE — 99232 SBSQ HOSP IP/OBS MODERATE 35: CPT

## 2024-04-04 PROCEDURE — 74176 CT ABD & PELVIS W/O CONTRAST: CPT

## 2024-04-04 PROCEDURE — 95700 EEG CONT REC W/VID EEG TECH: CPT

## 2024-04-04 PROCEDURE — 94003 VENT MGMT INPAT SUBQ DAY: CPT

## 2024-04-04 PROCEDURE — 99291 CRITICAL CARE FIRST HOUR: CPT

## 2024-04-04 PROCEDURE — 74176 CT ABD & PELVIS W/O CONTRAST: CPT | Performed by: RADIOLOGY

## 2024-04-04 RX ORDER — PREDNISONE 20 MG/1
40 TABLET ORAL DAILY
Status: DISCONTINUED | OUTPATIENT
Start: 2024-04-05 | End: 2024-04-06 | Stop reason: HOSPADM

## 2024-04-04 RX ORDER — MORPHINE SULFATE 4 MG/ML
4 INJECTION, SOLUTION INTRAMUSCULAR; INTRAVENOUS EVERY 4 HOURS PRN
Status: DISCONTINUED | OUTPATIENT
Start: 2024-04-04 | End: 2024-04-04

## 2024-04-04 RX ORDER — INSULIN GLARGINE 100 [IU]/ML
15 INJECTION, SOLUTION SUBCUTANEOUS EVERY 24 HOURS
Status: DISCONTINUED | OUTPATIENT
Start: 2024-04-04 | End: 2024-04-06 | Stop reason: HOSPADM

## 2024-04-04 RX ADMIN — CEFTRIAXONE SODIUM 2 G: 2 INJECTION, SOLUTION INTRAVENOUS at 13:39

## 2024-04-04 RX ADMIN — INSULIN GLARGINE 15 UNITS: 100 INJECTION, SOLUTION SUBCUTANEOUS at 13:06

## 2024-04-04 RX ADMIN — INSULIN LISPRO 3 UNITS: 100 INJECTION, SOLUTION INTRAVENOUS; SUBCUTANEOUS at 13:07

## 2024-04-04 RX ADMIN — BUDESONIDE 0.5 MG: 0.5 INHALANT RESPIRATORY (INHALATION) at 09:22

## 2024-04-04 RX ADMIN — FUROSEMIDE 20 MG: 10 INJECTION, SOLUTION INTRAMUSCULAR; INTRAVENOUS at 10:01

## 2024-04-04 RX ADMIN — INSULIN LISPRO 3 UNITS: 100 INJECTION, SOLUTION INTRAVENOUS; SUBCUTANEOUS at 10:00

## 2024-04-04 RX ADMIN — POLYETHYLENE GLYCOL 3350 17 G: 17 POWDER, FOR SOLUTION ORAL at 10:00

## 2024-04-04 RX ADMIN — INSULIN LISPRO 3 UNITS: 100 INJECTION, SOLUTION INTRAVENOUS; SUBCUTANEOUS at 03:29

## 2024-04-04 RX ADMIN — METHYLPREDNISOLONE SODIUM SUCCINATE 30 MG: 40 INJECTION, POWDER, FOR SOLUTION INTRAMUSCULAR; INTRAVENOUS at 10:00

## 2024-04-04 RX ADMIN — IPRATROPIUM BROMIDE AND ALBUTEROL SULFATE 3 ML: 2.5; .5 SOLUTION RESPIRATORY (INHALATION) at 09:22

## 2024-04-04 RX ADMIN — MONTELUKAST 10 MG: 10 TABLET, FILM COATED ORAL at 20:29

## 2024-04-04 RX ADMIN — HEPARIN SODIUM 7500 UNITS: 5000 INJECTION INTRAVENOUS; SUBCUTANEOUS at 21:12

## 2024-04-04 RX ADMIN — FUROSEMIDE 20 MG: 10 INJECTION, SOLUTION INTRAMUSCULAR; INTRAVENOUS at 20:30

## 2024-04-04 RX ADMIN — AMLODIPINE BESYLATE 10 MG: 10 TABLET ORAL at 10:01

## 2024-04-04 RX ADMIN — ATORVASTATIN CALCIUM 10 MG: 10 TABLET, FILM COATED ORAL at 20:30

## 2024-04-04 RX ADMIN — INSULIN LISPRO 6 UNITS: 100 INJECTION, SOLUTION INTRAVENOUS; SUBCUTANEOUS at 19:35

## 2024-04-04 RX ADMIN — INSULIN LISPRO 6 UNITS: 100 INJECTION, SOLUTION INTRAVENOUS; SUBCUTANEOUS at 18:17

## 2024-04-04 RX ADMIN — PANTOPRAZOLE SODIUM 40 MG: 40 INJECTION, POWDER, FOR SOLUTION INTRAVENOUS at 05:21

## 2024-04-04 RX ADMIN — HEPARIN SODIUM 7500 UNITS: 5000 INJECTION INTRAVENOUS; SUBCUTANEOUS at 05:21

## 2024-04-04 RX ADMIN — BUDESONIDE 0.5 MG: 0.5 INHALANT RESPIRATORY (INHALATION) at 22:19

## 2024-04-04 RX ADMIN — ATENOLOL 100 MG: 50 TABLET ORAL at 10:02

## 2024-04-04 RX ADMIN — HEPARIN SODIUM 7500 UNITS: 5000 INJECTION INTRAVENOUS; SUBCUTANEOUS at 13:39

## 2024-04-04 ASSESSMENT — PAIN - FUNCTIONAL ASSESSMENT
PAIN_FUNCTIONAL_ASSESSMENT: CPOT (CRITICAL CARE PAIN OBSERVATION TOOL)

## 2024-04-04 ASSESSMENT — COGNITIVE AND FUNCTIONAL STATUS - GENERAL
DRESSING REGULAR UPPER BODY CLOTHING: TOTAL
MOVING FROM LYING ON BACK TO SITTING ON SIDE OF FLAT BED WITH BEDRAILS: TOTAL
HELP NEEDED FOR BATHING: TOTAL
DAILY ACTIVITIY SCORE: 6
TURNING FROM BACK TO SIDE WHILE IN FLAT BAD: TOTAL
TOILETING: TOTAL
EATING MEALS: TOTAL
HELP NEEDED FOR BATHING: TOTAL
CLIMB 3 TO 5 STEPS WITH RAILING: TOTAL
WALKING IN HOSPITAL ROOM: TOTAL
PERSONAL GROOMING: TOTAL
MOVING TO AND FROM BED TO CHAIR: TOTAL
STANDING UP FROM CHAIR USING ARMS: TOTAL
DRESSING REGULAR UPPER BODY CLOTHING: TOTAL
PERSONAL GROOMING: TOTAL
MOBILITY SCORE: 6
STANDING UP FROM CHAIR USING ARMS: TOTAL
CLIMB 3 TO 5 STEPS WITH RAILING: TOTAL
TOILETING: TOTAL
MOVING FROM LYING ON BACK TO SITTING ON SIDE OF FLAT BED WITH BEDRAILS: TOTAL
MOVING TO AND FROM BED TO CHAIR: TOTAL
DRESSING REGULAR LOWER BODY CLOTHING: TOTAL
MOBILITY SCORE: 6
DAILY ACTIVITIY SCORE: 6
WALKING IN HOSPITAL ROOM: TOTAL
EATING MEALS: TOTAL
TURNING FROM BACK TO SIDE WHILE IN FLAT BAD: TOTAL
DRESSING REGULAR LOWER BODY CLOTHING: TOTAL

## 2024-04-04 ASSESSMENT — PAIN SCALES - GENERAL
PAINLEVEL_OUTOF10: 0 - NO PAIN

## 2024-04-04 NOTE — PROGRESS NOTES
Spiritual Care Visit    Clinical Encounter Type  Visited With: Patient and family together  Routine Visit: Follow-up  Continue Visiting: Yes    Restoration Encounters  Restoration Needs: Prayer, Restoration articles         Sacramental Encounters  Communion: Patient is currently unable  Communion Given Indicator: No                             Taxonomy  Intended Effects: Establish rapport and connectedness, Cici affirmation, Build relationship of care and support, Demonstrate caring and concern

## 2024-04-04 NOTE — PROGRESS NOTES
Gilberto Albarran is a 63 y.o. male on day 3 of admission presenting with Cardiac arrest (CMS/HCC).      Subjective   Patient seen and examined at bedside. Continues to have a GCS of 3, no primitive reflexes, no spontaneous movements, unable to follow commands. vEEG today until 2pm    Objective     Last Recorded Vitals  /55   Pulse 62   Temp 36.9 °C (98.4 °F) (Esophageal)   Resp 23   Wt 139 kg (306 lb)   SpO2 95%   Intake/Output last 3 Shifts:    Intake/Output Summary (Last 24 hours) at 4/4/2024 1335  Last data filed at 4/4/2024 1200  Gross per 24 hour   Intake 257.38 ml   Output 4425 ml   Net -4167.62 ml       Admission Weight  Weight: 139 kg (306 lb 10.6 oz) (04/02/24 0600)    Daily Weight  04/04/24 : 139 kg (306 lb)    Image Results  XR abdomen 1 view  Narrative: Interpreted By:  Moi Jarvis,   STUDY:  XR ABDOMEN 1 VIEW;  4/3/2024 7:28 pm      INDICATION:  Signs/Symptoms:Large OG output concern for ileus/obstruction.      COMPARISON:  Radiographs of the upper abdomen dated 04/01/2024..      ACCESSION NUMBER(S):  [id number]      ORDERING CLINICIAN:  WALDEMAR MATOS      FINDINGS:  Enteric tube courses inferior to the diaphragm, with the tip  overlying the expected location of the gastric body in the left upper  quadrant.      There is paucity of bowel gas in the abdomen. No definite evidence of  pneumoperitoneum, although evaluation is limited by supine imaging.      Limited evaluation of pneumoperitoneum on supine imaging, however no  gross evidence of free air is noted.      There are low lung volumes with bibasilar atelectasis and suggestion  of left-sided pleural effusion.      Osseous structures demonstrate no acute bony changes.      Impression: 1.  Enteric tube courses inferior to the diaphragm with the tip  overlying the expected location of the gastric body in the left upper  quadrant.  2. Paucity of bowel gas over the abdomen.      MACRO:  None      Signed by: Moi Jarvis  4/3/2024 8:59 PM  Dictation workstation:   [id number]  ECG 12 lead  Normal sinus rhythm  Right bundle branch block  Abnormal ECG  When compared with ECG of 02-APR-2024 06:59, (unconfirmed)  No significant change was found  Confirmed by Silvestre Rg ([id number]) on 4/3/2024 8:36:41 PM  ECG 12 lead  Normal sinus rhythm  Right bundle branch block  Abnormal ECG  When compared with ECG of 29-MAR-2024 12:10, (unconfirmed)  Premature ventricular complexes are no longer Present    Confirmed by Silvestre Rg ([id number]) on 4/3/2024 8:35:25 PM  ECG 12 lead  Sinus rhythm with occasional Premature ventricular complexes  Right bundle branch block  Abnormal ECG  When compared with ECG of 27-NOV-2017 11:13,  Premature ventricular complexes are now Present  QRS axis Shifted left  Confirmed by Silvestre Rg ([id number]) on 4/3/2024 8:33:08 PM      Physical Exam  Neurological:      Comments: Intubated. Absent corneal, gag reflex. Small pupillary reflex, sluggish pupils. Not responding to commands. Not making any purposeful movements. No pain response. GCS 3   Scheduled medications  amLODIPine, 10 mg, oral, Daily  atenolol, 100 mg, oral, Daily  atorvastatin, 10 mg, oral, Nightly  budesonide, 0.5 mg, nebulization, BID  cefTRIAXone, 2 g, intravenous, q24h  furosemide, 20 mg, intravenous, BID  heparin (porcine), 7,500 Units, subcutaneous, q8h YANIQUE  insulin glargine, 15 Units, subcutaneous, q24h  insulin lispro, 0-15 Units, subcutaneous, q4h  losartan, 100 mg, oral, Daily  montelukast, 10 mg, oral, Nightly  pantoprazole, 40 mg, intravenous, Daily before breakfast  polyethylene glycol, 17 g, oral, Daily  [START ON 4/5/2024] predniSONE, 40 mg, oral, Daily      Continuous medications  dexmedeTOMIDine, 0.1-1.5 mcg/kg/hr, Last Rate: Stopped (04/03/24 0900)      PRN medications  PRN medications: acetaminophen **OR** acetaminophen, dextrose, dextrose, glucagon, ipratropium-albuteroL, magnesium sulfate, magnesium sulfate, oxygen, potassium chloride  CR **OR** potassium chloride, potassium chloride  Results for orders placed or performed during the hospital encounter of 04/01/24 (from the past 24 hour(s))   POCT GLUCOSE   Result Value Ref Range    POCT Glucose 195 (H) 74 - 99 mg/dL   POCT GLUCOSE   Result Value Ref Range    POCT Glucose 179 (H) 74 - 99 mg/dL   POCT GLUCOSE   Result Value Ref Range    POCT Glucose 145 (H) 74 - 99 mg/dL   CBC   Result Value Ref Range    WBC 10.5 4.4 - 11.3 x10*3/uL    nRBC 0.0 0.0 - 0.0 /100 WBCs    RBC 3.50 (L) 4.50 - 5.90 x10*6/uL    Hemoglobin 10.6 (L) 13.5 - 17.5 g/dL    Hematocrit 33.6 (L) 41.0 - 52.0 %    MCV 96 80 - 100 fL    MCH 30.3 26.0 - 34.0 pg    MCHC 31.5 (L) 32.0 - 36.0 g/dL    RDW 13.1 11.5 - 14.5 %    Platelets 214 150 - 450 x10*3/uL   Comprehensive Metabolic Panel   Result Value Ref Range    Glucose 215 (H) 74 - 99 mg/dL    Sodium 141 136 - 145 mmol/L    Potassium 4.0 3.5 - 5.3 mmol/L    Chloride 102 98 - 107 mmol/L    Bicarbonate 32 21 - 32 mmol/L    Anion Gap 11 10 - 20 mmol/L    Urea Nitrogen 41 (H) 6 - 23 mg/dL    Creatinine 1.06 0.50 - 1.30 mg/dL    eGFR 79 >60 mL/min/1.73m*2    Calcium 8.4 (L) 8.6 - 10.3 mg/dL    Albumin 3.1 (L) 3.4 - 5.0 g/dL    Alkaline Phosphatase 57 33 - 136 U/L    Total Protein 5.7 (L) 6.4 - 8.2 g/dL    AST 40 (H) 9 - 39 U/L    Bilirubin, Total 0.4 0.0 - 1.2 mg/dL    ALT 23 10 - 52 U/L   Magnesium   Result Value Ref Range    Magnesium 1.90 1.60 - 2.40 mg/dL   Phosphorus   Result Value Ref Range    Phosphorus 3.4 2.5 - 4.9 mg/dL   POCT GLUCOSE   Result Value Ref Range    POCT Glucose 189 (H) 74 - 99 mg/dL   POCT GLUCOSE   Result Value Ref Range    POCT Glucose 178 (H) 74 - 99 mg/dL   POCT GLUCOSE   Result Value Ref Range    POCT Glucose 194 (H) 74 - 99 mg/dL     XR abdomen 1 view    Result Date: 4/3/2024  Interpreted By:  Moi Jarvis, STUDY: XR ABDOMEN 1 VIEW;  4/3/2024 7:28 pm   INDICATION: Signs/Symptoms:Large OG output concern for ileus/obstruction.   COMPARISON:  Radiographs of the upper abdomen dated 04/01/2024..   ACCESSION NUMBER(S): [id number]   ORDERING CLINICIAN: WALDEMAR MATOS   FINDINGS: Enteric tube courses inferior to the diaphragm, with the tip overlying the expected location of the gastric body in the left upper quadrant.   There is paucity of bowel gas in the abdomen. No definite evidence of pneumoperitoneum, although evaluation is limited by supine imaging.   Limited evaluation of pneumoperitoneum on supine imaging, however no gross evidence of free air is noted.   There are low lung volumes with bibasilar atelectasis and suggestion of left-sided pleural effusion.   Osseous structures demonstrate no acute bony changes.       1.  Enteric tube courses inferior to the diaphragm with the tip overlying the expected location of the gastric body in the left upper quadrant. 2. Paucity of bowel gas over the abdomen.   MACRO: None   Signed by: Moi Jarvis 4/3/2024 8:59 PM Dictation workstation:   [id number]    ECG 12 lead    Result Date: 4/3/2024  Normal sinus rhythm Right bundle branch block Abnormal ECG When compared with ECG of 02-APR-2024 06:59, (unconfirmed) No significant change was found Confirmed by Silvestre Rg ([id number]) on 4/3/2024 8:36:41 PM    ECG 12 lead    Result Date: 4/3/2024  Normal sinus rhythm Right bundle branch block Abnormal ECG When compared with ECG of 29-MAR-2024 12:10, (unconfirmed) Premature ventricular complexes are no longer Present Confirmed by Silvestre Rg ([id number]) on 4/3/2024 8:35:25 PM    ECG 12 lead    Result Date: 4/3/2024  Sinus rhythm with occasional Premature ventricular complexes Right bundle branch block Abnormal ECG When compared with ECG of 27-NOV-2017 11:13, Premature ventricular complexes are now Present QRS axis Shifted left Confirmed by Silvestre Rg ([id number]) on 4/3/2024 8:33:08 PM    ECG 12 lead    Result Date: 4/2/2024  ** * Pediatric ECG analysis * ** Sinus rhythm with Possible Premature atrial  complexes with Aberrant conduction Left axis deviation Right bundle branch block PEDIATRIC ANALYSIS - MANUAL COMPARISON REQUIRED When compared with ECG of 29-MAR-2024 14:55, PREVIOUS ECG IS PRESENT    XR chest 1 view    Result Date: 4/2/2024  Interpreted By:  Stephon Juarez, STUDY: XR CHEST 1 VIEW;  4/2/2024 10:04 am   INDICATION: Signs/Symptoms:re-assess, on vent.   COMPARISON: 04/01/2024   ACCESSION NUMBER(S): [id number]   ORDERING CLINICIAN: NIKKI PULIDO   FINDINGS: Support tubes are unchanged. Diffuse bilateral airspace consolidation not significantly changed. Cardiomegaly. Pleural effusions not excluded. Pulmonary vascular congestion.       No significant change compared to yesterday.   MACRO: None   Signed by: Stephon Juarez 4/2/2024 11:08 AM Dictation workstation:   [id number]    ECG 12 lead    Result Date: 4/2/2024  Sinus bradycardia Right bundle branch block Abnormal ECG When compared with ECG of 01-APR-2024 12:56, (unconfirmed) No significant change was found    ECG 12 Lead    Result Date: 4/1/2024  Normal sinus rhythm Right bundle branch block T wave abnormality, consider lateral ischemia Abnormal ECG When compared with ECG of 29-MAR-2024 14:55, (unconfirmed) Criteria for Anteroseptal infarct are no longer Present Nonspecific T wave abnormality now evident in Inferior leads    CT head wo IV contrast    Result Date: 4/1/2024  Interpreted By:  Isaiah Reynolds, STUDY: CT HEAD WO IV CONTRAST;  4/1/2024 4:40 am   INDICATION: Signs/Symptoms:cardiac arrest. myoclonic jerking.   COMPARISON: 10/25/2023   ACCESSION NUMBER(S): [id number]   ORDERING CLINICIAN: FIFI CHAPARRO   TECHNIQUE: Noncontrast axial CT scan of head was performed. Angled reformats in brain and bone windows were generated. The images were reviewed in bone, brain, blood and soft tissue windows.   FINDINGS: Global volume loss and moderate chronic small vessel ischemic change as seen on prior brain MRI.   No findings of acute hemorrhage. No mass effect.  No midline shift. Stable ventricular size   If persistent concern, consider MRI. Mastoid air cells and paranasal sinuses are clear for mild ethmoid sinus mucosal thickening       Senescent changes. No evidence of acute intracranial abnormality. If persistent concern, further evaluation is advised with MRI   No evidence of intracranial hemorrhage or displaced skull fracture.   MACRO: None   Signed by: Isaiah Reynolds 4/1/2024 6:13 AM Dictation workstation:   [id number]    XR chest 1 view    Result Date: 4/1/2024  STUDY: Chest Radiograph;  4/1/2024 INDICATION: Chest pain. COMPARISON: 3/29/2024 XR Chest ACCESSION NUMBER(S): [id number] ORDERING CLINICIAN: FIFI CHAPARRO TECHNIQUE:  Frontal chest was obtained at 01:52 hours. FINDINGS: Endotracheal tube tip is approximately 4.5 cm above the mee. Nasogastric tube tip identified to the distal esophagus. Mild cardiac enlargement.  Patchy bilateral airspace disease.  No discernible pleural effusion or pneumothorax.    Patchy bilateral airspace disease.  Congestive failure vs. pneumonia. Correlate clinically. Endotracheal tube tip is approximately 4.5 cm above the mee.  Nasogastric tube tip identified to the distal esophagus.  Continued advancement recommended. Signed by Gilberto Parham MD    XR abdomen 1 view    Result Date: 4/1/2024  Interpreted By:  Michael Brito, STUDY: XR ABDOMEN 1 VIEW; ;  4/1/2024 2:25 am   INDICATION: Signs/Symptoms:og placement.   COMPARISON: 03/29/2024   ACCESSION NUMBER(S): [id number]   ORDERING CLINICIAN: FIFI CHAPARRO   FINDINGS: Single AP radiograph of the right upper abdomen. NG/OG tube tip projects over the distal esophagus. Streaky bibasilar airspace opacities. Upper abdomen is unremarkable. Remote right anterior rib fractures.       1. NG/OG tube tip projects over the distal esophagus. Consider advancement.   Signed by: Michael Brito 4/1/2024 3:06 AM Dictation workstation:   [id number]    Transthoracic Echo (TTE)  Complete    Result Date: 3/30/2024            [facility] [address], [facility] [address]    Tel [phone] Fax [phone] TRANSTHORACIC ECHOCARDIOGRAM REPORT  Patient Name:      PIPER BRYANTDIANA Gimenez Physician:    [id number] Feliberto Roberts MD Study Date:        3/30/2024             Ordering Provider:    [id number] ELISE FONG MRN/PID:           [id number]              Fellow: Accession#:        [id number]          Nurse: Date of Birth/Age: 1960 / 63 years Sonographer:          Cleo Villarreal RDCS Gender:            M                     Additional Staff: Height:            175.26 cm             Admit Date:           3/29/2024 Weight:            136.08 kg             Admission Status:     Inpatient -                                                                Priority                                                                discharge BSA / BMI:         2.45 m2 / 44.30 kg/m2 Department Location:  [address] Blood Pressure: 138 /65 mmHg Study Type:    TRANSTHORACIC ECHO (TTE) COMPLETE Diagnosis/ICD: Other ill defined heart diseases-I51.89 Indication:    Congestive Heart Failure CPT Codes:     Echo Complete w Full [id number] Patient History: Diabetes:          Yes BMI:               Overweight 25 - 30 Pertinent History: HTN, Hyperlipidemia, COPD and Dyspnea. HARRIETT; Parkinsons                    disease; no previous echocardiogram. Study Detail: The following Echo studies were performed: 2D, M-Mode, Doppler and               color flow. Technically challenging study due to body habitus and               patient lying in supine position.  PHYSICIAN INTERPRETATION: Left Ventricle: Left ventricular systolic function is normal, with an estimated ejection fraction of 55-60%. There are no regional wall motion abnormalities. The left ventricular cavity size is  normal. Spectral Doppler shows a normal pattern of left ventricular diastolic filling. Left Atrium: The left atrium was not well visualized. Right Ventricle: The right ventricle is mildly enlarged. Not well visualized. Right Atrium: The right atrium was not well visualized. Aortic Valve: The aortic valve appears structurally normal. There is no evidence of aortic valve regurgitation. The peak instantaneous gradient of the aortic valve is 9.4 mmHg. Mitral Valve: The mitral valve is normal in structure. There is trace mitral valve regurgitation. Tricuspid Valve: The tricuspid valve is structurally normal. There is trace tricuspid regurgitation. The estimated RVSP is 46 mm. Pulmonic Valve: The pulmonic valve is structurally normal. There is no indication of pulmonic valve regurgitation. Pericardium: There is no pericardial effusion noted. Aorta: The aortic root is normal.  CONCLUSIONS:  1. Left ventricular systolic function is normal with a 55-60% estimated ejection fraction.  2. The estimated RVSP is 46 mm. QUANTITATIVE DATA SUMMARY: 2D MEASUREMENTS:                           Normal Ranges: LAs:           5.54 cm    (2.7-4.0cm) IVSd:          0.97 cm    (0.6-1.1cm) LVPWd:         1.14 cm    (0.6-1.1cm) LVIDd:         6.19 cm    (3.9-5.9cm) LVIDs:         4.24 cm LV Mass Index: 114.1 g/m2 LV % FS        31.6 % LA VOLUME:                               Normal Ranges: LA Vol A4C:        72.5 ml    (22+/-6mL/m2) LA Vol Index A4C:  29.6 ml/m2 LA Area A4C:       23.0 cm2 LA Major Axis A4C: 6.2 cm LA Vol A4C:        65.3 ml AORTA MEASUREMENTS:                      Normal Ranges: Ao Sinus, d: 3.20 cm (2.1-3.5cm) Ao STJ, d:   2.70 cm (1.7-3.4cm) Asc Ao, d:   2.80 cm (2.1-3.4cm) LV SYSTOLIC FUNCTION BY 2D PLANIMETRY (MOD):                     Normal Ranges: EF-A4C View: 49.8 % (>=55%) LV DIASTOLIC FUNCTION:                               Normal Ranges: MV Peak E:        1.04 m/s    (0.7-1.2 m/s) MV Peak A:        0.41 m/s     (0.42-0.7 m/s) E/A Ratio:        2.51        (1.0-2.2) MV lateral e'     0.07 m/s PulmV Sys Michel:    31.29 cm/s PulmV Blair Michel:   66.92 cm/s PulmV S/D Michel:    0.47 PulmV A Revs Michel: 19.62 cm/s PulmV A Revs Dur: 105.61 msec MITRAL VALVE:                 Normal Ranges: MV DT: 188 msec (150-240msec) AORTIC VALVE:                         Normal Ranges: AoV Vmax:      1.53 m/s (<=1.7m/s) AoV Peak P.4 mmHg (<20mmHg) LVOT Max Michel:  0.80 m/s (<=1.1m/s) LVOT VTI:      15.43 cm LVOT Diameter: 1.76 cm  (1.8-2.4cm) AoV Area,Vmax: 1.26 cm2 (2.5-4.5cm2)  RIGHT VENTRICLE: RV Basal 5.80 cm RV Mid   5.70 cm RV Major 7.4 cm TAPSE:   17.0 mm RV s'    0.11 m/s TRICUSPID VALVE/RVSP:                             Normal Ranges: Peak TR Velocity: 3.09 m/s RV Syst Pressure: 46.1 mmHg (< 30mmHg) PULMONIC VALVE:                         Normal Ranges: PV Accel Time: 128 msec (>120ms) PV Max Michel:    1.1 m/s  (0.6-0.9m/s) PV Max P.0 mmHg Pulmonary Veins: PulmV A Revs Dur: 105.61 msec PulmV A Revs Michel: 19.62 cm/s PulmV Blair Michel:   66.92 cm/s PulmV S/D Michel:    0.47 PulmV Sys Michel:    31.29 cm/s AORTA: Asc Ao Diam 2.83 cm  [id number] Feliberto Roberts MD Electronically signed on 3/30/2024 at 3:13:31 PM  ** Final **     CT angio chest for pulmonary embolism    Result Date: 3/29/2024  STUDY: CT Angiogram of the Chest; 2024, 1:44 PM. INDICATION: Hypoxia ,shortness of breath, elevate D-dimer. COMPARISON: CXR: 2024. ACCESSION NUMBER(S): [id number] ORDERING CLINICIAN: DARCY CLEVELAND TECHNIQUE:  CTA of the chest was performed with intravenous contrast. Images are reviewed and processed at a workstation according to the CT angiogram protocol with 3-D and/or MIP post processing imaging generated.  Omnipaque 350 60 mL was administered intravenously. Automated mA/kV exposure control was utilized and patient examination was performed in strict accordance with principles of ALARA. FINDINGS: Pulmonary arteries are adequately opacified  without acute or chronic filling defects.  The thoracic aorta is normal in course and caliber without dissection or aneurysm. Atherosclerosis of the coronary arteries is present.  The heart is mildly enlarged without pericardial effusion.  Prominent mediastinal lymph nodes are present, likely reactive. Representative subcarinal lymph node measures 1.8 cm short axis dimension. Mild atelectatic changes are present.  Trace bilateral pleural effusions are present. There is no pneumothorax.  The airways are patent. No consolidation, interstitial disease, or suspicious nodules. Low-attenuation lesion about the anterior aspect of the LEFT hepatic lobe measures 3.2 cm in size, most likely cysts. There are no acute fractures.  No suspicious bony lesions.  Senescent changes of the thoracic spine are present.      1. There is no evidence of pulmonary embolus. 2. Mild cardiomegaly. Atherosclerosis of the coronary arteries is present. Trace bilateral pleural effusions are present. 3. Mediastinal adenopathy, likely reactive. Signed by Danny Gtz II, MD    XR chest 1 view    Result Date: 3/29/2024  STUDY: Chest Radiograph;  3/29/2024 12:34 PM. INDICATION: Chest pain. COMPARISON: None Available. ACCESSION NUMBER(S): [id number] ORDERING CLINICIAN: DARCY CLEVELAND TECHNIQUE:  Frontal chest was obtained at 12:34 hours. FINDINGS: CARDIOMEDIASTINAL SILHOUETTE: Cardiomediastinal silhouette is normal in size and configuration.  LUNGS: Lungs are clear.  ABDOMEN: No remarkable upper abdominal findings.  BONES: No acute osseous changes.  Postoperative changes of the right shoulder.    No acute cardiopulmonary abnormality. Signed by Sae Garcia MD     Assessment/Plan     Principal Problem:    Cardiac arrest (CMS/[facility])  Active Problems:    COPD exacerbation (CMS/[facility])    HTN (hypertension)    Hyperlipidemia    Class 3 severe obesity due to excess calories with body mass index (BMI) of 40.0 to 44.9 in adult (CMS/HCC)    Parkinson  disease (CMS/HCC)    Type 2 diabetes mellitus (CMS/HCC)    Acute hypoxic respiratory failure (CMS/HCC)    63 year old male with multiple medical problems presents after witnessed cardiac arrest.      Hypoxic ischemic encephalopathy s/p cardiac arrest  Acute hypoxic and hypercarbic respiratory failure  Hx COPD     Plan:     1) Imaging reviewed, extensive discussion with family. Explained that due to not having primitive reflexes, that prognosis may not be good.   2) vEEG ordered until 2pm 4/4/24. Will discuss results with family about prognosis        Staffed with Dr. Barahona,   Monster Vela MD  Internal Medicine, PGY-3

## 2024-04-04 NOTE — PROGRESS NOTES
Nutrition Follow Up Assessment:   Nutrition Assessment    Reason for Assessment: Provider consult order    Patient History: Gilberto Albarran is a 63 y.o. male presenting to ED on 4/1 and admitted to ICU after witnessed cardiac arrest.  He was intubated initially with pressor support, fentanyl, and propofol.  Cardiology following as well as palliative.  NG tube in place.     Of note, pt had been admitted here from 3/29-3/31 with wheezing and SOB x 1 week.  Pt had fall prior to that admit where he was notably more confused afterwards.     4/4/24 Follow up note - pt continues on vent support with video EEG being completed today.  Until results and prognosis is known family wants to utilize NG for water flushes only at this time which pt has been receiving 200ml 6x/day per DO recs.     Past Medical History: COPD (not on home O2), HARRIETT (wears BIPAP), Parkinson's (with hallucinations), HTN, HLD, morbid obesity, DM type 2     Nutrition History:  Energy Intake:  (NPO)  Food and Nutrient History: Unknown diet history prior to admission  Food Allergies/Intolerances:  None  GI Symptoms: None, last BM 4/1  Oral Problems:  intubated       Current Diet: Enteral feeding with ; Every 4 hours    Anthropometrics:      Weight: 139 kg (306 lb)   BMI (Calculated): 45.17             Weight Change %:  Weight History / % Weight Change: Wt gain noted per archives, no edema noted at this time    Pain Assessment: CPOT  Pain Score: 0 - No pain      Nutrition Significant Labs:  BMP Trend:   Results from last 7 days   Lab Units 04/04/24  0311 04/03/24  0414 04/02/24  0308 04/01/24  1334   GLUCOSE mg/dL 215* 220* 198* 374*   CALCIUM mg/dL 8.4* 8.6 8.3* 8.1*   SODIUM mmol/L 141 140 137 137   POTASSIUM mmol/L 4.0 4.1 4.3 3.9   CO2 mmol/L 32 30 28 29   CHLORIDE mmol/L 102 102 101 101   BUN mg/dL 41* 40* 40* 37*   CREATININE mg/dL 1.06 1.20 1.43* 1.59*    , A1C:  Lab Results   Component Value Date    HGBA1C 5.2 04/01/2024       Nutrition Specific  Medications:  Scheduled medications  amLODIPine, 10 mg, oral, Daily  atenolol, 100 mg, oral, Daily  atorvastatin, 10 mg, oral, Nightly  budesonide, 0.5 mg, nebulization, BID  cefTRIAXone, 2 g, intravenous, q24h  furosemide, 20 mg, intravenous, BID  heparin (porcine), 7,500 Units, subcutaneous, q8h YANIQUE  insulin glargine, 15 Units, subcutaneous, q24h  insulin lispro, 0-15 Units, subcutaneous, q4h  losartan, 100 mg, oral, Daily  montelukast, 10 mg, oral, Nightly  pantoprazole, 40 mg, intravenous, Daily before breakfast  polyethylene glycol, 17 g, oral, Daily  [START ON 4/5/2024] predniSONE, 40 mg, oral, Daily      Continuous medications  dexmedeTOMIDine, 0.1-1.5 mcg/kg/hr, Last Rate: Stopped (04/03/24 0900)        Nutrition Focused Physical Exam Findings:  defer: below from previous assessment  Subcutaneous Fat Loss:   Orbital Fat Pads: Well nourished (slightly bulging fat pads)  Buccal Fat Pads: Defer  Triceps: Defer  Muscle Wasting:  Temporalis: Well nourished (well-defined muscle)  Pectoralis (Clavicular Region): Well nourished (clavicle not visible)  Deltoid/Trapezius: Well nourished (rounded appearance at arm, shoulder, neck)  Interosseous: Defer  Trapezius/Infraspinatus/Supraspinatus (Scapular Region): Defer  Quadriceps: Defer  Gastrocnemius: Defer  Edema:  Edema: none  Physical Findings:  Hair: Negative  Eyes: Negative  Mouth: Negative  Nails: Negative  Skin: Negative    I/O:   Last BM Date: 04/01/24; Stool Appearance: Unable to assess (no stool to assess this shift) (04/02/24 2000)     Estimated Needs:   Total Energy Estimated Needs (kCal):  (1960-2450kcal or 20-25kcal/kg ABW of 98.1kg)  Total Protein Estimated Needs (g):  (118-137g or 1.2-1.4g ABW of 98.1kg)  Total Fluid Estimated Needs (mL):  (1ml/kcal or per MD recs)          Nutrition Diagnosis        Nutrition Diagnosis  Patient has Nutrition Diagnosis: Yes  Diagnosis Status (1): New  Nutrition Diagnosis 1: Increased nutrient needs  Related to (1): acute  nutritional stress  As Evidenced by (1): recent cardiac arrest requiring vent support, currently unable to extubate with family declining EN support at this time.       Nutrition Interventions/Recommendations         Nutrition Prescription:  Individualized Nutrition Prescription Provided for : Continue with water flushes only per family wishes at this time, spouse states pt would not want EN support        Nutrition Interventions:   Food and/or Nutrient Delivery Interventions  Interventions: Enteral intake  Enteral Intake: Other (Comment)  Goal: Continue with 200ml water flush 6x/day to provide 1200ml daily.    Coordination of Nutrition Care by a Nutrition Professional  Collaboration and Referral of Nutrition Care: Collaboration by nutrition professional with other providers  Goal: Agustín Reed DO       Nutrition Monitoring and Evaluation   Food/Nutrient Related History Monitoring  Monitoring and Evaluation Plan: Enteral and parenteral nutrition intake  Criteria: not desired at this time  Enteral and Parenteral Nutrition Intake: Enteral nutrition intake  Criteria: not desired at this time       Biochemical Data, Medical Tests and Procedures  Monitoring and Evaluation Plan: Electrolyte/renal panel  Electrolyte and Renal Panel: Phosphorus, Magnesium, Chloride, Sodium, BUN  Criteria: Maintain within acceptable range          Time Spent/Follow-up Reminder:   Time Spent (min): 45 minutes  Last Date of Nutrition Visit: 04/04/24  Nutrition Follow-Up Needed?: 3-5 days  Follow up Comment: JHW - family only wanting water flushes at this time

## 2024-04-04 NOTE — CARE PLAN
The patient's goals for the shift include  To remain safe and free from injury     The clinical goals for the shift include Patient to remain hemodynamically stable throughout the shift    Over the shift, the patient did not make progress toward the following goals. Barriers to progression include impaired cognition, weakness, and poor nutritional intake. Recommendations to address these barriers include   Problem: Pain  Goal: My pain/discomfort is manageable  Outcome: Progressing     Problem: Safety  Goal: Patient will be injury free during hospitalization  Outcome: Progressing  Goal: I will remain free of falls  Outcome: Progressing     Problem: Daily Care  Goal: Daily care needs are met  Outcome: Progressing     Problem: Psychosocial Needs  Goal: Demonstrates ability to cope with hospitalization/illness  Outcome: Progressing  Goal: Collaborate with me, my family, and caregiver to identify my specific goals  Outcome: Progressing     Problem: Discharge Barriers  Goal: My discharge needs are met  Outcome: Progressing     Problem: Skin  Goal: Participates in plan/prevention/treatment measures  Outcome: Progressing  Flowsheets (Taken 4/4/2024 0017)  Participates in plan/prevention/treatment measures:   Discuss with provider PT/OT consult   Elevate heels  Goal: Prevent/manage excess moisture  Outcome: Progressing  Flowsheets (Taken 4/4/2024 0017)  Prevent/manage excess moisture:   Cleanse incontinence/protect with barrier cream   Monitor for/manage infection if present   Follow provider orders for dressing changes   Use wicking fabric (obtain order)  Goal: Prevent/minimize sheer/friction injuries  Outcome: Progressing  Flowsheets (Taken 4/4/2024 0017)  Prevent/minimize sheer/friction injuries:   Increase activity/out of bed for meals   Use pull sheet   Complete micro-shifts as needed if patient unable. Adjust patient position to relieve pressure points, not a full turn   HOB 30 degrees or less   Turn/reposition every 2  hours/use positioning/transfer devices  Goal: Promote/optimize nutrition  Outcome: Progressing  Flowsheets (Taken 4/4/2024 0017)  Promote/optimize nutrition:   Monitor/record intake including meals   Consume > 50% meals/supplements   Offer water/supplements/favorite foods   Discuss with provider if NPO > 2 days  Goal: Promote skin healing  Outcome: Progressing  Flowsheets (Taken 4/4/2024 0017)  Promote skin healing:   Assess skin/pad under line(s)/device(s)   Protective dressings over bony prominences   Turn/reposition every 2 hours/use positioning/transfer devices   Ensure correct size (line/device) and apply per  instructions     Problem: Indwelling Catheter Maintenance  Goal: I will have no complications from indwelling catheter  Outcome: Progressing   .

## 2024-04-04 NOTE — CARE PLAN
The clinical goals for the shift include Pt will remain hemodynamically stable this shift.      Problem: Pain  Goal: My pain/discomfort is manageable  Outcome: Not Progressing     Problem: Safety  Goal: Patient will be injury free during hospitalization  Outcome: Met  Goal: I will remain free of falls  Outcome: Met     Problem: Daily Care  Goal: Daily care needs are met  Outcome: Met     Problem: Psychosocial Needs  Goal: Demonstrates ability to cope with hospitalization/illness  Outcome: Not Progressing  Goal: Collaborate with me, my family, and caregiver to identify my specific goals  Outcome: Not Progressing     Problem: Discharge Barriers  Goal: My discharge needs are met  Outcome: Not Progressing     Problem: Skin  Goal: Participates in plan/prevention/treatment measures  Outcome: Progressing  Flowsheets (Taken 4/4/2024 0800)  Participates in plan/prevention/treatment measures: Elevate heels  Goal: Prevent/manage excess moisture  Outcome: Progressing  Flowsheets (Taken 4/4/2024 0800)  Prevent/manage excess moisture: Cleanse incontinence/protect with barrier cream  Goal: Prevent/minimize sheer/friction injuries  Outcome: Progressing  Flowsheets (Taken 4/4/2024 0800)  Prevent/minimize sheer/friction injuries: Turn/reposition every 2 hours/use positioning/transfer devices  Goal: Promote/optimize nutrition  Outcome: Progressing  Flowsheets (Taken 4/4/2024 0800)  Promote/optimize nutrition: Discuss with provider if NPO > 2 days  Goal: Promote skin healing  Outcome: Progressing  Flowsheets (Taken 4/4/2024 0800)  Promote skin healing: Turn/reposition every 2 hours/use positioning/transfer devices     Problem: Indwelling Catheter Maintenance  Goal: I will have no complications from indwelling catheter  Outcome: Met     Problem: Pain - Adult  Goal: Verbalizes/displays adequate comfort level or baseline comfort level  Outcome: Met     Problem: Safety - Adult  Goal: Free from fall injury  Outcome: Met     Problem:  Discharge Planning  Goal: Discharge to home or other facility with appropriate resources  Outcome: Not Progressing     Problem: Chronic Conditions and Co-morbidities  Goal: Patient's chronic conditions and co-morbidity symptoms are monitored and maintained or improved  Outcome: Not Progressing     Problem: Diabetes  Goal: Achieve decreasing blood glucose levels by end of shift  Outcome: Progressing  Goal: Increase stability of blood glucose readings by end of shift  Outcome: Progressing  Goal: Decrease in ketones present in urine by end of shift  Outcome: Progressing  Goal: Maintain electrolyte levels within acceptable range throughout shift  Outcome: Progressing  Goal: Maintain glucose levels >70mg/dl to <250mg/dl throughout shift  Outcome: Progressing  Goal: No changes in neurological exam by end of shift  Outcome: Progressing  Goal: Learn about and adhere to nutrition recommendations by end of shift  Outcome: Progressing  Goal: Vital signs within normal range for age by end of shift  Outcome: Progressing  Goal: Increase self care and/or family involovement by end of shift  Outcome: Not Progressing  Goal: Receive DSME education by end of shift  Outcome: Not Progressing     Problem: Knowledge Deficit  Goal: Patient/family/caregiver demonstrates understanding of disease process, treatment plan, medications, and discharge instructions  Outcome: Met     Problem: Mechanical Ventilation  Goal: Patient Will Maintain Patent Airway  Outcome: Met  Goal: Oral health is maintained or improved  Outcome: Met  Goal: Tracheostomy will be managed safely  Outcome: Not Progressing  Goal: ET tube will be managed safely  Outcome: Met  Goal: Ability to express needs and understand communication  Outcome: Not Progressing  Goal: Mobility/activity is maintained at optimum level for patient  Outcome: Not Progressing       Problem: Pain  Goal: My pain/discomfort is manageable  Outcome: Not Progressing     Problem: Psychosocial Needs  Goal:  Demonstrates ability to cope with hospitalization/illness  Outcome: Not Progressing  Goal: Collaborate with me, my family, and caregiver to identify my specific goals  Outcome: Not Progressing     Problem: Discharge Barriers  Goal: My discharge needs are met  Outcome: Not Progressing     Problem: Discharge Planning  Goal: Discharge to home or other facility with appropriate resources  Outcome: Not Progressing     Problem: Chronic Conditions and Co-morbidities  Goal: Patient's chronic conditions and co-morbidity symptoms are monitored and maintained or improved  Outcome: Not Progressing     Problem: Diabetes  Goal: Increase self care and/or family involovement by end of shift  Outcome: Not Progressing  Goal: Receive DSME education by end of shift  Outcome: Not Progressing     Problem: Mechanical Ventilation  Goal: Tracheostomy will be managed safely  Outcome: Not Progressing  Goal: Ability to express needs and understand communication  Outcome: Not Progressing  Goal: Mobility/activity is maintained at optimum level for patient  Outcome: Not Progressing

## 2024-04-04 NOTE — PROGRESS NOTES
Gilberto Albarran is a 63 y.o. male on day 3 of admission presenting with Cardiac arrest (CMS/HCC).    Subjective   Symptoms (0 - 10, Best to Worst)  [facility] Symptom Assessment System  Pain Score: 0 - No pain       Objective     Physical Exam  Vitals and nursing note reviewed.   Constitutional:       Appearance: He is ill-appearing.      Comments: Patient is off sedation and non responsive.    HENT:      Head: Normocephalic.      Nose: Nose normal.      Mouth/Throat:      Mouth: Mucous membranes are moist.   Eyes:      Conjunctiva/sclera: Conjunctivae normal.   Cardiovascular:      Rate and Rhythm: Normal rate.      Pulses: Normal pulses.   Pulmonary:      Comments: Patient is mechanically ventilated  Abdominal:      General: Bowel sounds are normal.   Skin:     General: Skin is dry.      Capillary Refill: Capillary refill takes 2 to 3 seconds.      Coloration: Skin is pale.   Neurological:      Sensory: Sensory deficit present.      Comments: Video EEG shows severe diffuse encephalopathy         Last Recorded Vitals  Blood pressure 151/55, pulse 66, temperature 36.9 °C (98.4 °F), temperature source Esophageal, resp. rate 24, weight 139 kg (306 lb), SpO2 97 %.  Intake/Output last 3 Shifts:  I/O last 3 completed shifts:  In: 350.2 (2.5 mL/kg) [I.V.:350.2 (2.5 mL/kg)]  Out: 5845 (42.1 mL/kg) [Urine:4195 (0.8 mL/kg/hr); Emesis/NG output:1650]  Weight: 138.8 kg     Relevant Results  amLODIPine, 10 mg, oral, Daily  atenolol, 100 mg, oral, Daily  atorvastatin, 10 mg, oral, Nightly  budesonide, 0.5 mg, nebulization, BID  cefTRIAXone, 2 g, intravenous, q24h  furosemide, 20 mg, intravenous, BID  heparin (porcine), 7,500 Units, subcutaneous, q8h YANIQUE  insulin glargine, 15 Units, subcutaneous, q24h  insulin lispro, 0-15 Units, subcutaneous, q4h  losartan, 100 mg, oral, Daily  montelukast, 10 mg, oral, Nightly  pantoprazole, 40 mg, intravenous, Daily before breakfast  polyethylene glycol, 17 g, oral, Daily  [START ON 4/5/2024]  predniSONE, 40 mg, oral, Daily       Results for orders placed or performed during the hospital encounter of 04/01/24 (from the past 24 hour(s))   POCT GLUCOSE   Result Value Ref Range    POCT Glucose 195 (H) 74 - 99 mg/dL   POCT GLUCOSE   Result Value Ref Range    POCT Glucose 179 (H) 74 - 99 mg/dL   POCT GLUCOSE   Result Value Ref Range    POCT Glucose 145 (H) 74 - 99 mg/dL   CBC   Result Value Ref Range    WBC 10.5 4.4 - 11.3 x10*3/uL    nRBC 0.0 0.0 - 0.0 /100 WBCs    RBC 3.50 (L) 4.50 - 5.90 x10*6/uL    Hemoglobin 10.6 (L) 13.5 - 17.5 g/dL    Hematocrit 33.6 (L) 41.0 - 52.0 %    MCV 96 80 - 100 fL    MCH 30.3 26.0 - 34.0 pg    MCHC 31.5 (L) 32.0 - 36.0 g/dL    RDW 13.1 11.5 - 14.5 %    Platelets 214 150 - 450 x10*3/uL   Comprehensive Metabolic Panel   Result Value Ref Range    Glucose 215 (H) 74 - 99 mg/dL    Sodium 141 136 - 145 mmol/L    Potassium 4.0 3.5 - 5.3 mmol/L    Chloride 102 98 - 107 mmol/L    Bicarbonate 32 21 - 32 mmol/L    Anion Gap 11 10 - 20 mmol/L    Urea Nitrogen 41 (H) 6 - 23 mg/dL    Creatinine 1.06 0.50 - 1.30 mg/dL    eGFR 79 >60 mL/min/1.73m*2    Calcium 8.4 (L) 8.6 - 10.3 mg/dL    Albumin 3.1 (L) 3.4 - 5.0 g/dL    Alkaline Phosphatase 57 33 - 136 U/L    Total Protein 5.7 (L) 6.4 - 8.2 g/dL    AST 40 (H) 9 - 39 U/L    Bilirubin, Total 0.4 0.0 - 1.2 mg/dL    ALT 23 10 - 52 U/L   Magnesium   Result Value Ref Range    Magnesium 1.90 1.60 - 2.40 mg/dL   Phosphorus   Result Value Ref Range    Phosphorus 3.4 2.5 - 4.9 mg/dL   POCT GLUCOSE   Result Value Ref Range    POCT Glucose 189 (H) 74 - 99 mg/dL   POCT GLUCOSE   Result Value Ref Range    POCT Glucose 178 (H) 74 - 99 mg/dL   POCT GLUCOSE   Result Value Ref Range    POCT Glucose 194 (H) 74 - 99 mg/dL        Assessment/Plan   IMP:  Patient admitted from the ED on 4/1 after full arrest. During his ICU course he has never regained consciousness and he remains on mechanical ventilation. Patient went through cooling and rewarming process. It was  the patient's wishes that his care be more comfort focused that aggressive care and the family has been steadfast in trying not to overstep his expressed wishes as he has been battling with severe chronic illness for quite some time with the last year experiencing a large decline. A video EEG was performed post cooling/warming that shows diffuse encephalopathy. Knowing that he will have no meaningful recovery, the family wishes to shift focus to supportive care and they wish to engage [facility] in his care going forward. [facility] is present in the ICU and the patient will be considered a DCD patient under their care and the care of the ICU team until the time that surgery occurs.        Provider estimate of survival: hours to days  Patient Prognostic Awareness: Patient unable to respond    Patient/proxy preference for information  Prefers full information    Goals of Care  Focus of care is comfort and to offer support to family as they work with [facility] to prepare for organ procurement.     Is the patient hospice-eligible?   Yes  Was a discussion held re hospice services?   yes  Was a decision made re hospice services?  NA at this time Hospice will not be involved due to [facility] involvement. Hospice may be consulted if patient is found not suitable for organ donation.     Other Palliative Support  Spiritual support through Pastoral care as family vigils and prepares for the loss of their family member.        I spent 60 minutes in the review, planning and care of this patient.       Raeann Morrell, ALYX-CNS

## 2024-04-04 NOTE — PROGRESS NOTES
Critical Care Daily Progress        Subjective   Patient is a 63 y.o. male admitted on 4/1/2024  1:28 AM with the following indication(s) for ICU care s/p cardiac arrest likely in the setting of hypercapnia, currently on mechanically ventilated.      Interval History/Brief Hospital Course:  63-year-old male presenting to the hospital status postcardiac arrest in setting of hypercapnia complicated by COPD exacerbation/HARRIETT on BiPAP intubated en route per EMS.  Remains in the ICU but is not yet to awaken completed cooling and rewarming per ICU protocol given the post arrest, concern for cerebral anoxia neurology following plan for vEEG.    Overnight Events: Continue with sedation holiday today noting the patient has been off sedation for approx. 24 hours with no clinical change in mentation, GCS remains 3. vEEG continues.     Complaints: Intubated and not able to endorse complaints.      Scheduled Medications:   amLODIPine, 10 mg, oral, Daily  atenolol, 100 mg, oral, Daily  atorvastatin, 10 mg, oral, Nightly  budesonide, 0.5 mg, nebulization, BID  cefTRIAXone, 2 g, intravenous, q24h  pantoprazole, 40 mg, oral, Daily before breakfast   Or  esomeprazole, 40 mg, nasoduodenal tube, Daily before breakfast   Or  pantoprazole, 40 mg, intravenous, Daily before breakfast  furosemide, 20 mg, intravenous, BID  heparin (porcine), 7,500 Units, subcutaneous, q8h YANIQUE  insulin glargine, 15 Units, subcutaneous, q24h  insulin lispro, 0-15 Units, subcutaneous, q4h  losartan, 100 mg, oral, Daily  methylPREDNISolone sodium succinate (PF), 30 mg, intravenous, BID  montelukast, 10 mg, oral, Nightly  polyethylene glycol, 17 g, oral, Daily         Continuous Medications:   dexmedeTOMIDine, 0.1-1.5 mcg/kg/hr, Last Rate: Stopped (04/03/24 0900)         PRN Medications:   PRN medications: acetaminophen **OR** acetaminophen, dextrose, dextrose, glucagon, ipratropium-albuteroL, magnesium sulfate, magnesium sulfate, oxygen, potassium chloride CR  **OR** potassium chloride, potassium chloride    Objective   Vitals:  Most Recent:  Vitals:    04/04/24 0700   BP:    Pulse: 58   Resp: 23   Temp:    SpO2: 97%       24hr Min/Max:  Temp  Min: 36.8 °C (98.2 °F)  Max: 36.9 °C (98.4 °F)  Pulse  Min: 54  Max: 66  Resp  Min: 23  Max: 24  SpO2  Min: 91 %  Max: 98 %          Vent settings:  Vent Mode: Assist control/Volume control plus  FiO2 (%):  [50 %] 50 %  S RR:  [24] 24  S VT:  [500 mL] 500 mL  PEEP/CPAP (cm H2O):  [8 cm H20] 8 cm H20  MAP (cm H2O):  [15-19] 19    Hemodynamic parameters for last 24 hours:       I/O:    Intake/Output Summary (Last 24 hours) at 4/4/2024 0832  Last data filed at 4/4/2024 0700  Gross per 24 hour   Intake 257.38 ml   Output 4600 ml   Net -4342.62 ml         Physical Exam:   Physical Exam   Physical Exam  Vitals and nursing note reviewed.   Constitutional:       Comments: Intubated, unresponsive   HENT:      Head: Normocephalic and atraumatic.      Mouth/Throat:      Mouth: Mucous membranes are dry.      Comments: Endotracheal tube in place  Eyes:      General: No scleral icterus.     Comments: pupils fixed and nonreactive bilaterally   Cardiovascular:      Rate and Rhythm: Normal rate and regular rhythm.      Pulses: Normal pulses.      Heart sounds: Normal heart sounds.   Pulmonary:      Comments: Mechanically ventilated  Abdominal:      General: Abdomen is flat.   Musculoskeletal:      Cervical back: Neck supple.      Right lower leg: Edema present.      Left lower leg: Edema present.      Comments: Chronic venous stasis dermatitis bilateral lower extremities   Skin:     General: Skin is warm and dry.      Capillary Refill: Capillary refill takes less than 2 seconds.   Neurological:      Comments: Remains unresponsive, GCS 3         Lab/Radiology/Diagnostic Review:  Results for orders placed or performed during the hospital encounter of 04/01/24 (from the past 24 hour(s))   POCT GLUCOSE   Result Value Ref Range    POCT Glucose 230 (H) 74  - 99 mg/dL   POCT GLUCOSE   Result Value Ref Range    POCT Glucose 195 (H) 74 - 99 mg/dL   POCT GLUCOSE   Result Value Ref Range    POCT Glucose 179 (H) 74 - 99 mg/dL   POCT GLUCOSE   Result Value Ref Range    POCT Glucose 145 (H) 74 - 99 mg/dL   CBC   Result Value Ref Range    WBC 10.5 4.4 - 11.3 x10*3/uL    nRBC 0.0 0.0 - 0.0 /100 WBCs    RBC 3.50 (L) 4.50 - 5.90 x10*6/uL    Hemoglobin 10.6 (L) 13.5 - 17.5 g/dL    Hematocrit 33.6 (L) 41.0 - 52.0 %    MCV 96 80 - 100 fL    MCH 30.3 26.0 - 34.0 pg    MCHC 31.5 (L) 32.0 - 36.0 g/dL    RDW 13.1 11.5 - 14.5 %    Platelets 214 150 - 450 x10*3/uL   Comprehensive Metabolic Panel   Result Value Ref Range    Glucose 215 (H) 74 - 99 mg/dL    Sodium 141 136 - 145 mmol/L    Potassium 4.0 3.5 - 5.3 mmol/L    Chloride 102 98 - 107 mmol/L    Bicarbonate 32 21 - 32 mmol/L    Anion Gap 11 10 - 20 mmol/L    Urea Nitrogen 41 (H) 6 - 23 mg/dL    Creatinine 1.06 0.50 - 1.30 mg/dL    eGFR 79 >60 mL/min/1.73m*2    Calcium 8.4 (L) 8.6 - 10.3 mg/dL    Albumin 3.1 (L) 3.4 - 5.0 g/dL    Alkaline Phosphatase 57 33 - 136 U/L    Total Protein 5.7 (L) 6.4 - 8.2 g/dL    AST 40 (H) 9 - 39 U/L    Bilirubin, Total 0.4 0.0 - 1.2 mg/dL    ALT 23 10 - 52 U/L   Magnesium   Result Value Ref Range    Magnesium 1.90 1.60 - 2.40 mg/dL   Phosphorus   Result Value Ref Range    Phosphorus 3.4 2.5 - 4.9 mg/dL   POCT GLUCOSE   Result Value Ref Range    POCT Glucose 189 (H) 74 - 99 mg/dL   POCT GLUCOSE   Result Value Ref Range    POCT Glucose 178 (H) 74 - 99 mg/dL     ECG 12 lead    Result Date: 4/2/2024  ** * Pediatric ECG analysis * ** Sinus rhythm with Possible Premature atrial complexes with Aberrant conduction Left axis deviation Right bundle branch block PEDIATRIC ANALYSIS - MANUAL COMPARISON REQUIRED When compared with ECG of 29-MAR-2024 14:55, PREVIOUS ECG IS PRESENT    XR chest 1 view    Result Date: 4/2/2024  Interpreted By:  Stephon Juarez, STUDY: XR CHEST 1 VIEW;  4/2/2024 10:04 am   INDICATION:  Signs/Symptoms:re-assess, on vent.   COMPARISON: 04/01/2024   ACCESSION NUMBER(S): [id number]   ORDERING CLINICIAN: NIKKI PULIDO   FINDINGS: Support tubes are unchanged. Diffuse bilateral airspace consolidation not significantly changed. Cardiomegaly. Pleural effusions not excluded. Pulmonary vascular congestion.       No significant change compared to yesterday.   MACRO: None   Signed by: Stephon Juarez 4/2/2024 11:08 AM Dictation workstation:   [id number]    ECG 12 lead    Result Date: 4/2/2024  Sinus bradycardia Right bundle branch block Abnormal ECG When compared with ECG of 01-APR-2024 12:56, (unconfirmed) No significant change was found    ECG 12 Lead    Result Date: 4/1/2024  Normal sinus rhythm Right bundle branch block T wave abnormality, consider lateral ischemia Abnormal ECG When compared with ECG of 29-MAR-2024 14:55, (unconfirmed) Criteria for Anteroseptal infarct are no longer Present Nonspecific T wave abnormality now evident in Inferior leads    CT head wo IV contrast    Result Date: 4/1/2024  Interpreted By:  Isaiah Reynolds, STUDY: CT HEAD WO IV CONTRAST;  4/1/2024 4:40 am   INDICATION: Signs/Symptoms:cardiac arrest. myoclonic jerking.   COMPARISON: 10/25/2023   ACCESSION NUMBER(S): [id number]   ORDERING CLINICIAN: FIFI CHAPARRO   TECHNIQUE: Noncontrast axial CT scan of head was performed. Angled reformats in brain and bone windows were generated. The images were reviewed in bone, brain, blood and soft tissue windows.   FINDINGS: Global volume loss and moderate chronic small vessel ischemic change as seen on prior brain MRI.   No findings of acute hemorrhage. No mass effect. No midline shift. Stable ventricular size   If persistent concern, consider MRI. Mastoid air cells and paranasal sinuses are clear for mild ethmoid sinus mucosal thickening       Senescent changes. No evidence of acute intracranial abnormality. If persistent concern, further evaluation is advised with MRI   No evidence of  intracranial hemorrhage or displaced skull fracture.   MACRO: None   Signed by: Isaiah Reynolds 4/1/2024 6:13 AM Dictation workstation:   [id number]    XR chest 1 view    Result Date: 4/1/2024  STUDY: Chest Radiograph;  4/1/2024 INDICATION: Chest pain. COMPARISON: 3/29/2024 XR Chest ACCESSION NUMBER(S): [id number] ORDERING CLINICIAN: FIFI CHAPARRO TECHNIQUE:  Frontal chest was obtained at 01:52 hours. FINDINGS: Endotracheal tube tip is approximately 4.5 cm above the mee. Nasogastric tube tip identified to the distal esophagus. Mild cardiac enlargement.  Patchy bilateral airspace disease.  No discernible pleural effusion or pneumothorax.    Patchy bilateral airspace disease.  Congestive failure vs. pneumonia. Correlate clinically. Endotracheal tube tip is approximately 4.5 cm above the mee.  Nasogastric tube tip identified to the distal esophagus.  Continued advancement recommended. Signed by Gilberto Parham MD    XR abdomen 1 view    Result Date: 4/1/2024  Interpreted By:  Michael Brito, STUDY: XR ABDOMEN 1 VIEW; ;  4/1/2024 2:25 am   INDICATION: Signs/Symptoms:og placement.   COMPARISON: 03/29/2024   ACCESSION NUMBER(S): [id number]   ORDERING CLINICIAN: FIFI CHAPARRO   FINDINGS: Single AP radiograph of the right upper abdomen. NG/OG tube tip projects over the distal esophagus. Streaky bibasilar airspace opacities. Upper abdomen is unremarkable. Remote right anterior rib fractures.       1. NG/OG tube tip projects over the distal esophagus. Consider advancement.   Signed by: Michael Brito 4/1/2024 3:06 AM Dictation workstation:   [id number]    ECG 12 lead    Result Date: 3/31/2024  Sinus rhythm with occasional Premature ventricular complexes Right bundle branch block Abnormal ECG When compared with ECG of 27-NOV-2017 11:13, Premature ventricular complexes are now Present QRS axis Shifted left    ECG 12 lead    Result Date: 3/31/2024  Normal sinus rhythm Right bundle branch block Anteroseptal  infarct , age undetermined Abnormal ECG When compared with ECG of 29-MAR-2024 12:10, (unconfirmed) Premature ventricular complexes are no longer Present Anteroseptal infarct is now Present    Transthoracic Echo (TTE) Complete    Result Date: 3/30/2024            [facility] [address] [place] [address]    Tel [phone] Fax [phone] TRANSTHORACIC ECHOCARDIOGRAM REPORT  Patient Name:      PIPER Gimenez Physician:    [id number] Feliberto Roberts MD Study Date:        3/30/2024             Ordering Provider:    [id number] ELISE FONG MRN/PID:           [id number]              Fellow: Accession#:        [id number]          Nurse: Date of Birth/Age: 1960 / 63 years Sonographer:          Cleo Villarreal RD Gender:            M                     Additional Staff: Height:            175.26 cm             Admit Date:           3/29/2024 Weight:            136.08 kg             Admission Status:     Inpatient -                                                                Priority                                                                discharge BSA / BMI:         2.45 m2 / 44.30 kg/m2 Department Location:  [address] Blood Pressure: 138 /65 mmHg Study Type:    TRANSTHORACIC ECHO (TTE) COMPLETE Diagnosis/ICD: Other ill defined heart diseases-I51.89 Indication:    Congestive Heart Failure CPT Codes:     Echo Complete w Full Doppler-[id number] Patient History: Diabetes:          Yes BMI:               Overweight 25 - 30 Pertinent History: HTN, Hyperlipidemia, COPD and Dyspnea. HARRIETT; Parkinsons                    disease; no previous echocardiogram. Study Detail: The following Echo studies were performed: 2D, M-Mode, Doppler and               color flow. Technically challenging study due to body habitus and               patient lying in supine position.   PHYSICIAN INTERPRETATION: Left Ventricle: Left ventricular systolic function is normal, with an estimated ejection fraction of 55-60%. There are no regional wall motion abnormalities. The left ventricular cavity size is normal. Spectral Doppler shows a normal pattern of left ventricular diastolic filling. Left Atrium: The left atrium was not well visualized. Right Ventricle: The right ventricle is mildly enlarged. Not well visualized. Right Atrium: The right atrium was not well visualized. Aortic Valve: The aortic valve appears structurally normal. There is no evidence of aortic valve regurgitation. The peak instantaneous gradient of the aortic valve is 9.4 mmHg. Mitral Valve: The mitral valve is normal in structure. There is trace mitral valve regurgitation. Tricuspid Valve: The tricuspid valve is structurally normal. There is trace tricuspid regurgitation. The estimated RVSP is 46 mm. Pulmonic Valve: The pulmonic valve is structurally normal. There is no indication of pulmonic valve regurgitation. Pericardium: There is no pericardial effusion noted. Aorta: The aortic root is normal.  CONCLUSIONS:  1. Left ventricular systolic function is normal with a 55-60% estimated ejection fraction.  2. The estimated RVSP is 46 mm. QUANTITATIVE DATA SUMMARY: 2D MEASUREMENTS:                           Normal Ranges: LAs:           5.54 cm    (2.7-4.0cm) IVSd:          0.97 cm    (0.6-1.1cm) LVPWd:         1.14 cm    (0.6-1.1cm) LVIDd:         6.19 cm    (3.9-5.9cm) LVIDs:         4.24 cm LV Mass Index: 114.1 g/m2 LV % FS        31.6 % LA VOLUME:                               Normal Ranges: LA Vol A4C:        72.5 ml    (22+/-6mL/m2) LA Vol Index A4C:  29.6 ml/m2 LA Area A4C:       23.0 cm2 LA Major Axis A4C: 6.2 cm LA Vol A4C:        65.3 ml AORTA MEASUREMENTS:                      Normal Ranges: Ao Sinus, d: 3.20 cm (2.1-3.5cm) Ao STJ, d:   2.70 cm (1.7-3.4cm) Asc Ao, d:   2.80 cm (2.1-3.4cm) LV SYSTOLIC FUNCTION BY 2D  PLANIMETRY (MOD):                     Normal Ranges: EF-A4C View: 49.8 % (>=55%) LV DIASTOLIC FUNCTION:                               Normal Ranges: MV Peak E:        1.04 m/s    (0.7-1.2 m/s) MV Peak A:        0.41 m/s    (0.42-0.7 m/s) E/A Ratio:        2.51        (1.0-2.2) MV lateral e'     0.07 m/s PulmV Sys Michel:    31.29 cm/s PulmV Blair Michel:   66.92 cm/s PulmV S/D Michel:    0.47 PulmV A Revs Michel: 19.62 cm/s PulmV A Revs Dur: 105.61 msec MITRAL VALVE:                 Normal Ranges: MV DT: 188 msec (150-240msec) AORTIC VALVE:                         Normal Ranges: AoV Vmax:      1.53 m/s (<=1.7m/s) AoV Peak P.4 mmHg (<20mmHg) LVOT Max Michel:  0.80 m/s (<=1.1m/s) LVOT VTI:      15.43 cm LVOT Diameter: 1.76 cm  (1.8-2.4cm) AoV Area,Vmax: 1.26 cm2 (2.5-4.5cm2)  RIGHT VENTRICLE: RV Basal 5.80 cm RV Mid   5.70 cm RV Major 7.4 cm TAPSE:   17.0 mm RV s'    0.11 m/s TRICUSPID VALVE/RVSP:                             Normal Ranges: Peak TR Velocity: 3.09 m/s RV Syst Pressure: 46.1 mmHg (< 30mmHg) PULMONIC VALVE:                         Normal Ranges: PV Accel Time: 128 msec (>120ms) PV Max Michel:    1.1 m/s  (0.6-0.9m/s) PV Max P.0 mmHg Pulmonary Veins: PulmV A Revs Dur: 105.61 msec PulmV A Revs Michel: 19.62 cm/s PulmV Blair Michel:   66.92 cm/s PulmV S/D Michel:    0.47 PulmV Sys Michel:    31.29 cm/s AORTA: Asc Ao Diam 2.83 cm  [id number] Feliberto Roberts MD Electronically signed on 3/30/2024 at 3:13:31 PM  ** Final **     CT angio chest for pulmonary embolism    Result Date: 3/29/2024  STUDY: CT Angiogram of the Chest; 2024, 1:44 PM. INDICATION: Hypoxia ,shortness of breath, elevate D-dimer. COMPARISON: CXR: 2024. ACCESSION NUMBER(S): [id number] ORDERING CLINICIAN: DARCY CLEVELAND TECHNIQUE:  CTA of the chest was performed with intravenous contrast. Images are reviewed and processed at a workstation according to the CT angiogram protocol with 3-D and/or MIP post processing imaging generated.  Omnipaque 350 60  mL was administered intravenously. Automated mA/kV exposure control was utilized and patient examination was performed in strict accordance with principles of ALARA. FINDINGS: Pulmonary arteries are adequately opacified without acute or chronic filling defects.  The thoracic aorta is normal in course and caliber without dissection or aneurysm. Atherosclerosis of the coronary arteries is present.  The heart is mildly enlarged without pericardial effusion.  Prominent mediastinal lymph nodes are present, likely reactive. Representative subcarinal lymph node measures 1.8 cm short axis dimension. Mild atelectatic changes are present.  Trace bilateral pleural effusions are present. There is no pneumothorax.  The airways are patent. No consolidation, interstitial disease, or suspicious nodules. Low-attenuation lesion about the anterior aspect of the LEFT hepatic lobe measures 3.2 cm in size, most likely cysts. There are no acute fractures.  No suspicious bony lesions.  Senescent changes of the thoracic spine are present.      1. There is no evidence of pulmonary embolus. 2. Mild cardiomegaly. Atherosclerosis of the coronary arteries is present. Trace bilateral pleural effusions are present. 3. Mediastinal adenopathy, likely reactive. Signed by Danny Gtz II, MD    XR chest 1 view    Result Date: 3/29/2024  STUDY: Chest Radiograph;  3/29/2024 12:34 PM. INDICATION: Chest pain. COMPARISON: None Available. ACCESSION NUMBER(S): [id number] ORDERING CLINICIAN: DARCY CLEVELAND TECHNIQUE:  Frontal chest was obtained at 12:34 hours. FINDINGS: CARDIOMEDIASTINAL SILHOUETTE: Cardiomediastinal silhouette is normal in size and configuration.  LUNGS: Lungs are clear.  ABDOMEN: No remarkable upper abdominal findings.  BONES: No acute osseous changes.  Postoperative changes of the right shoulder.    No acute cardiopulmonary abnormality. Signed by Sae Garcia MD              This patient is intubated   Reason for patient to remain  intubated today? they are unable to protect their airway             Assessment/Plan   Principal Problem:    Cardiac arrest (CMS/[facility])  Active Problems:    COPD exacerbation (CMS/[facility])    HTN (hypertension)    Hyperlipidemia    Class 3 severe obesity due to excess calories with body mass index (BMI) of 40.0 to 44.9 in adult (CMS/[facility])    Parkinson disease (CMS/[facility])    Type 2 diabetes mellitus (CMS/[facility])    Acute hypoxic respiratory failure (CMS/[facility])      Assessment:    Neuro:   #Parkinson's disease  #MDD  #Acute encephalopathy likely in the setting of anoxic brain injury  -History: none  -Home meds: Sinemet  mg tablet 3 times daily, Wellbutrin  mg 12-hour tablet twice daily (hold)  -GCS: E 1 V 1 M 1  -Pain: none  -Sedation: Off-wean Precedex off and DO NOT re-infuse precedex for agitation overnight.   -CIWA/COWS: none  -[facility] ICU  -Neurology consulted for no improvement in neuro-exam concern for anoxic brain injury, recommendation for vEEG for prognostication       Cardiac:   #S/p cardiac arrest due to hypercapnia s/p cooling  #Hyperlipidemia  #Essential hypertension  - Goals: [MAP> 65, HR ]  - Home meds: Norvasc 10 mg tablet daily, atenolol 100 mg tablet daily, atorvastatin 10 mg tablet daily, Cozaar 100 mg tablet daily (hold)  - Last echo: 3/30/2024 LVEF 55-60%, estimated RSVP 46 mm, aortic root normal no regional wall motion abnormalities LV cavity is normal size  - Pressors: None  - Continue with Lasix 20mg IV BID and continue with strict I&Os noting minimal UOP approx. 75 mL over 24 hours.     Pulmonary:   #Acute hypoxic respiratory failure due to COPD E on home O2 with hypercapnia   #HARRIETT on CPAP nightly, non-compliant  -Home meds: Advair Diskus 500-50 mcg, Singulair 10 mg tablet daily, albuterol 90 rescue  Vent Mode: Assist control/Volume control plus  FiO2 (%):  [50 %] 50 %  S RR:  [24] 24  S VT:  [500 mL] 500 mL  PEEP/CPAP (cm H2O):  [8 cm H20] 8 cm H20  MAP (cm H2O):  [15-19] 19   Continuous  pulse oximetry   O2 PRN to maintain SpO2 > 94%, wean as tolerated  -Imaging: Repeat CXR from 4/2 pulmonary vascular congestion and cardiomegaly diffuse airspace consolidations.  -Continue DuoNebs every 6 hours with as needed every 2 hours for symptoms of worsening wheezing hypoxia.  Continue home Singulair and Pulmicort.  -Continue Solu-Medrol 30 mg twice daily  -Will Continue w/Rocephin to complete 7 days, EOT 4/7    Gastrointestinal:   #GERD  -Home meds: None  - Diet: No enteral feeds per family request, will provide oral hydration via water flushes 200mL q4hrs.   - Supplementation: none  - Prophylaxis: 40 IV Protonix daily  - Bowel regimen: 17 g MiraLAX daily   - Last BM: Last BM Date: 04/01/24  -reported approx. 700cc output for OG tube yesterday so KUB ordered with no acute findings.     Renal:   #OUSMANE on CKD resolved  - Daily RFP,Mg,Phos  - History: none  - Home meds: Initiated 20 IV of Lasix daily, Continue to hold Januvia  - indwelling Parker with strict I/O  Net IO Since Admission: -3,598.96 mL [04/04/24 0832]  Results from last 72 hours   Lab Units 04/04/24  0311 04/03/24  0414   BUN mg/dL 41* 40*   CREATININE mg/dL 1.06 1.20         - Fluids: None mL/hr  - Electrolytes: Replete per protocol, goal K>4 Phos >3 Mg >2    Endocrine:   #IDDM type II  -Home meds: 50 units Lantus, glipizide, metformin, Ozempic, Januvia (all being held)   -sliding scale: Moderate SSI #3, Accu-Cheks every 4 hours with hypoglycemic protocols in place  -Patient sBG continues to be above goal increasing basal insulin/Lantus to 15u today 4/4  Results from last 7 days   Lab Units 04/04/24  0738 04/04/24  0326 04/04/24  0311 04/03/24  2338 04/03/24 2003 04/03/24  1600 04/03/24  1132 04/03/24  0725 04/03/24  0414   POCT GLUCOSE mg/dL 178* 189*  --  145* 179* 195* 230*   < >  --    GLUCOSE mg/dL  --   --  215*  --   --   --   --   --  220*    < > = values in this interval not displayed.        -BG < 180 goal    Hematology:   - Daily CBC,  coags  -History: None  -Home meds: None  Results from last 7 days   Lab Units 24  0311 24  0414   HEMOGLOBIN g/dL 10.6* 10.9*   HEMATOCRIT % 33.6* 34.9*   PLATELETS AUTO x10*3/uL 214 205       - DVT Prophylaxis: Heparin DVT prophylaxis, SCDs    Infectious Disease:   #Acute leukocytosis, resolved -History of recurrent COPD exacerbation was treated with systemic steroids and antibiotics most recently 3/29  Results from last 7 days   Lab Units 24  0311 24  0414   WBC AUTO x10*3/uL 10.5 14.8*       Temp (24hrs), Av.8 °C (98.3 °F), Min:36.8 °C (98.2 °F), Max:36.9 °C (98.4 °F)     -Abx: continue Rocephin 2 g today to complete a 7-day course end of therapy is   -Cultures: Blood cultures negative to date, urine strep and Legionella negative, MRSA negative tracheal aspirate ordered on     Musculoskeletal:   #Hx/o Right great toe wound  -Home meds: none  -PT OT when appropriate  -Heel protectors     Integumentary:   #Chronic venous stasis dermatitis bilateral lower extremities stable  -Home meds: None    LDA:  Arterial Line 24 Left (Active)   Placement Date/Time: 24 0545   Orientation: Left  Insertion attempts: 1   Number of days: 2       ETT  7.5 mm (Active)   Placement Date/Time: 24 0200   Placed by External Staff?: EMS  Single Lumen Tube Size: 7.5 mm  Cuffed: Yes  Location: Oral   Number of days: 2       Urethral Catheter 18 Fr. (Active)   Placement Date/Time: 24 0143   Placed by: geronimo  Tube Size (Fr.): 18 Fr.  Catheter Balloon Size: 10 mL  Urine Returned: Yes   Number of days: 2       NG/OG/Feeding Tube OG - Eden sump 16 Fr Left mouth (Active)   Placement Date/Time: 24 014   Placed by: kylah  Type of Tube: (c) NG/OG Tube  Tube Type: OG - Eden sump  NG/OG Tube Size: 16 Fr  Tube Location: Left mouth   Number of days: 2         CODE STATUS: DNR    Disposition: Admitted to the ICU s/p cardiac arrest requiring cooling, currently on ventilator, prognosis  remains guarded given sedation holiday without improvement in neuro exam, neurology following plan for vEEG noting palliative care is following and plan for possible withdrawal of care.     ABCDEF Checklist  Analgesia: Spontaneous awakening trial to be pursued if clinically appropriate. RASS goal reviewed  Breathing: Spontaneous breathing trial to be pursued if clinically appropriate. Mechanical power of assisted ventilation reviewed  Choice of analgesia/sedation: Analgesic and sedative agents adjusted per clinical context.   Delirium assessed by CAM, will avoid exacerbating factors  Early mobility and exercise: Physical and occupational therapy engaged  Family: Plan of care, overall trajectory of patient shared with family. Questions elicited and answered as appropriate.       Due to the high probability of life threatening clinical decompensation, the patient required critical care time evaluating and managing this patient.  Critical care time included obtaining a history, examining the patient, ordering and reviewing studies, discussing, developing, and implementing a management plan, evaluating the patient's response to treatment, and discussion with other care team providers. I saw and evaluated the patient myself.  Critical care time was performed exclusive of billable procedures.        Agustín Reed, DO PGY-2  Critical Care Medicine

## 2024-04-05 ENCOUNTER — APPOINTMENT (OUTPATIENT)
Dept: PHYSICAL THERAPY | Facility: CLINIC | Age: 64
End: 2024-04-05
Payer: COMMERCIAL

## 2024-04-05 LAB
ALBUMIN SERPL BCP-MCNC: 3.2 G/DL (ref 3.4–5)
ALBUMIN SERPL BCP-MCNC: 3.2 G/DL (ref 3.4–5)
ALP SERPL-CCNC: 73 U/L (ref 33–136)
ALP SERPL-CCNC: 76 U/L (ref 33–136)
ALT SERPL W P-5'-P-CCNC: 33 U/L (ref 10–52)
ALT SERPL W P-5'-P-CCNC: 43 U/L (ref 10–52)
ANION GAP SERPL CALC-SCNC: 12 MMOL/L (ref 10–20)
ANION GAP SERPL CALC-SCNC: 12 MMOL/L (ref 10–20)
APPEARANCE UR: CLEAR
APTT PPP: 31 SECONDS (ref 27–38)
APTT PPP: 33 SECONDS (ref 27–38)
AST SERPL W P-5'-P-CCNC: 69 U/L (ref 9–39)
AST SERPL W P-5'-P-CCNC: 70 U/L (ref 9–39)
BACTERIA BLD CULT: NORMAL
BACTERIA BLD CULT: NORMAL
BILIRUB DIRECT SERPL-MCNC: 0.1 MG/DL (ref 0–0.3)
BILIRUB DIRECT SERPL-MCNC: 0.1 MG/DL (ref 0–0.3)
BILIRUB SERPL-MCNC: 0.4 MG/DL (ref 0–1.2)
BILIRUB SERPL-MCNC: 0.5 MG/DL (ref 0–1.2)
BILIRUB UR STRIP.AUTO-MCNC: NEGATIVE MG/DL
BUN SERPL-MCNC: 36 MG/DL (ref 6–23)
BUN SERPL-MCNC: 40 MG/DL (ref 6–23)
CA-I BLD-SCNC: 1.12 MMOL/L (ref 1.1–1.33)
CA-I BLD-SCNC: 1.15 MMOL/L (ref 1.1–1.33)
CA-I BLD-SCNC: 1.15 MMOL/L (ref 1.1–1.33)
CALCIUM SERPL-MCNC: 8.8 MG/DL (ref 8.6–10.3)
CALCIUM SERPL-MCNC: 8.9 MG/DL (ref 8.6–10.3)
CHLORIDE SERPL-SCNC: 100 MMOL/L (ref 98–107)
CHLORIDE SERPL-SCNC: 101 MMOL/L (ref 98–107)
CO2 SERPL-SCNC: 33 MMOL/L (ref 21–32)
CO2 SERPL-SCNC: 33 MMOL/L (ref 21–32)
COLOR UR: YELLOW
CREAT SERPL-MCNC: 1.14 MG/DL (ref 0.5–1.3)
CREAT SERPL-MCNC: 1.16 MG/DL (ref 0.5–1.3)
EGFRCR SERPLBLD CKD-EPI 2021: 71 ML/MIN/1.73M*2
EGFRCR SERPLBLD CKD-EPI 2021: 72 ML/MIN/1.73M*2
GLUCOSE BLD MANUAL STRIP-MCNC: 167 MG/DL (ref 74–99)
GLUCOSE BLD MANUAL STRIP-MCNC: 179 MG/DL (ref 74–99)
GLUCOSE BLD MANUAL STRIP-MCNC: 190 MG/DL (ref 74–99)
GLUCOSE BLD MANUAL STRIP-MCNC: 192 MG/DL (ref 74–99)
GLUCOSE BLD MANUAL STRIP-MCNC: 214 MG/DL (ref 74–99)
GLUCOSE BLD MANUAL STRIP-MCNC: 221 MG/DL (ref 74–99)
GLUCOSE SERPL-MCNC: 182 MG/DL (ref 74–99)
GLUCOSE SERPL-MCNC: 226 MG/DL (ref 74–99)
GLUCOSE UR STRIP.AUTO-MCNC: NEGATIVE MG/DL
HOLD SPECIMEN: NORMAL
INR PPP: 1.1 (ref 0.9–1.1)
INR PPP: 1.2 (ref 0.9–1.1)
KETONES UR STRIP.AUTO-MCNC: NEGATIVE MG/DL
LACTATE SERPL-SCNC: 0.8 MMOL/L (ref 0.4–2)
LACTATE SERPL-SCNC: 0.9 MMOL/L (ref 0.4–2)
LEUKOCYTE ESTERASE UR QL STRIP.AUTO: NEGATIVE
MAGNESIUM SERPL-MCNC: 1.98 MG/DL (ref 1.6–2.4)
MAGNESIUM SERPL-MCNC: 2.1 MG/DL (ref 1.6–2.4)
NITRITE UR QL STRIP.AUTO: NEGATIVE
PH UR STRIP.AUTO: 7 [PH]
PHOSPHATE SERPL-MCNC: 3.2 MG/DL (ref 2.5–4.9)
PHOSPHATE SERPL-MCNC: 3.5 MG/DL (ref 2.5–4.9)
POTASSIUM SERPL-SCNC: 3.9 MMOL/L (ref 3.5–5.3)
POTASSIUM SERPL-SCNC: 4 MMOL/L (ref 3.5–5.3)
PROT SERPL-MCNC: 6.1 G/DL (ref 6.4–8.2)
PROT SERPL-MCNC: 6.2 G/DL (ref 6.4–8.2)
PROT UR STRIP.AUTO-MCNC: NEGATIVE MG/DL
PROTHROMBIN TIME: 12.7 SECONDS (ref 9.8–12.8)
PROTHROMBIN TIME: 13.2 SECONDS (ref 9.8–12.8)
RBC # UR STRIP.AUTO: ABNORMAL /UL
RBC #/AREA URNS AUTO: NORMAL /HPF
SODIUM SERPL-SCNC: 141 MMOL/L (ref 136–145)
SODIUM SERPL-SCNC: 142 MMOL/L (ref 136–145)
SP GR UR STRIP.AUTO: 1.01
UROBILINOGEN UR STRIP.AUTO-MCNC: <2 MG/DL
WBC #/AREA URNS AUTO: NORMAL /HPF

## 2024-04-05 PROCEDURE — 37799 UNLISTED PX VASCULAR SURGERY: CPT

## 2024-04-05 PROCEDURE — 81003 URINALYSIS AUTO W/O SCOPE: CPT

## 2024-04-05 PROCEDURE — 94640 AIRWAY INHALATION TREATMENT: CPT

## 2024-04-05 PROCEDURE — 80053 COMPREHEN METABOLIC PANEL: CPT

## 2024-04-05 PROCEDURE — 82947 ASSAY GLUCOSE BLOOD QUANT: CPT

## 2024-04-05 PROCEDURE — 82248 BILIRUBIN DIRECT: CPT

## 2024-04-05 PROCEDURE — 99291 CRITICAL CARE FIRST HOUR: CPT | Performed by: EMERGENCY MEDICINE

## 2024-04-05 PROCEDURE — 81001 URINALYSIS AUTO W/SCOPE: CPT

## 2024-04-05 PROCEDURE — 2500000004 HC RX 250 GENERAL PHARMACY W/ HCPCS (ALT 636 FOR OP/ED)

## 2024-04-05 PROCEDURE — 2020000001 HC ICU ROOM DAILY

## 2024-04-05 PROCEDURE — 2500000002 HC RX 250 W HCPCS SELF ADMINISTERED DRUGS (ALT 637 FOR MEDICARE OP, ALT 636 FOR OP/ED)

## 2024-04-05 PROCEDURE — 2500000001 HC RX 250 WO HCPCS SELF ADMINISTERED DRUGS (ALT 637 FOR MEDICARE OP)

## 2024-04-05 PROCEDURE — 82330 ASSAY OF CALCIUM: CPT

## 2024-04-05 PROCEDURE — 94003 VENT MGMT INPAT SUBQ DAY: CPT

## 2024-04-05 PROCEDURE — 85610 PROTHROMBIN TIME: CPT

## 2024-04-05 PROCEDURE — 83735 ASSAY OF MAGNESIUM: CPT

## 2024-04-05 PROCEDURE — 84100 ASSAY OF PHOSPHORUS: CPT

## 2024-04-05 PROCEDURE — C9113 INJ PANTOPRAZOLE SODIUM, VIA: HCPCS

## 2024-04-05 PROCEDURE — 83605 ASSAY OF LACTIC ACID: CPT

## 2024-04-05 RX ADMIN — ATENOLOL 100 MG: 50 TABLET ORAL at 08:28

## 2024-04-05 RX ADMIN — INSULIN GLARGINE 15 UNITS: 100 INJECTION, SOLUTION SUBCUTANEOUS at 14:01

## 2024-04-05 RX ADMIN — LOSARTAN POTASSIUM 100 MG: 100 TABLET, FILM COATED ORAL at 08:28

## 2024-04-05 RX ADMIN — ATORVASTATIN CALCIUM 10 MG: 10 TABLET, FILM COATED ORAL at 20:00

## 2024-04-05 RX ADMIN — INSULIN LISPRO 6 UNITS: 100 INJECTION, SOLUTION INTRAVENOUS; SUBCUTANEOUS at 14:01

## 2024-04-05 RX ADMIN — PREDNISONE 40 MG: 20 TABLET ORAL at 08:28

## 2024-04-05 RX ADMIN — AMLODIPINE BESYLATE 10 MG: 10 TABLET ORAL at 08:28

## 2024-04-05 RX ADMIN — BUDESONIDE 0.5 MG: 0.5 INHALANT RESPIRATORY (INHALATION) at 20:16

## 2024-04-05 RX ADMIN — BUDESONIDE 0.5 MG: 0.5 INHALANT RESPIRATORY (INHALATION) at 09:31

## 2024-04-05 RX ADMIN — INSULIN LISPRO 3 UNITS: 100 INJECTION, SOLUTION INTRAVENOUS; SUBCUTANEOUS at 20:00

## 2024-04-05 RX ADMIN — CEFTRIAXONE SODIUM 2 G: 2 INJECTION, SOLUTION INTRAVENOUS at 14:01

## 2024-04-05 RX ADMIN — INSULIN LISPRO 3 UNITS: 100 INJECTION, SOLUTION INTRAVENOUS; SUBCUTANEOUS at 03:25

## 2024-04-05 RX ADMIN — INSULIN LISPRO 6 UNITS: 100 INJECTION, SOLUTION INTRAVENOUS; SUBCUTANEOUS at 17:37

## 2024-04-05 RX ADMIN — POLYETHYLENE GLYCOL 3350 17 G: 17 POWDER, FOR SOLUTION ORAL at 08:28

## 2024-04-05 RX ADMIN — INSULIN LISPRO 3 UNITS: 100 INJECTION, SOLUTION INTRAVENOUS; SUBCUTANEOUS at 08:27

## 2024-04-05 RX ADMIN — PANTOPRAZOLE SODIUM 40 MG: 40 INJECTION, POWDER, FOR SOLUTION INTRAVENOUS at 05:15

## 2024-04-05 RX ADMIN — HEPARIN SODIUM 7500 UNITS: 5000 INJECTION INTRAVENOUS; SUBCUTANEOUS at 14:01

## 2024-04-05 RX ADMIN — HEPARIN SODIUM 7500 UNITS: 5000 INJECTION INTRAVENOUS; SUBCUTANEOUS at 21:53

## 2024-04-05 RX ADMIN — INSULIN LISPRO 3 UNITS: 100 INJECTION, SOLUTION INTRAVENOUS; SUBCUTANEOUS at 23:25

## 2024-04-05 RX ADMIN — FUROSEMIDE 20 MG: 10 INJECTION, SOLUTION INTRAMUSCULAR; INTRAVENOUS at 08:28

## 2024-04-05 RX ADMIN — MONTELUKAST 10 MG: 10 TABLET, FILM COATED ORAL at 20:00

## 2024-04-05 RX ADMIN — HEPARIN SODIUM 7500 UNITS: 5000 INJECTION INTRAVENOUS; SUBCUTANEOUS at 05:15

## 2024-04-05 RX ADMIN — FUROSEMIDE 20 MG: 10 INJECTION, SOLUTION INTRAMUSCULAR; INTRAVENOUS at 20:00

## 2024-04-05 ASSESSMENT — COGNITIVE AND FUNCTIONAL STATUS - GENERAL
MOVING FROM LYING ON BACK TO SITTING ON SIDE OF FLAT BED WITH BEDRAILS: TOTAL
DRESSING REGULAR UPPER BODY CLOTHING: TOTAL
TOILETING: TOTAL
WALKING IN HOSPITAL ROOM: TOTAL
DRESSING REGULAR LOWER BODY CLOTHING: TOTAL
MOVING FROM LYING ON BACK TO SITTING ON SIDE OF FLAT BED WITH BEDRAILS: TOTAL
MOBILITY SCORE: 6
CLIMB 3 TO 5 STEPS WITH RAILING: TOTAL
PERSONAL GROOMING: TOTAL
TURNING FROM BACK TO SIDE WHILE IN FLAT BAD: TOTAL
MOVING TO AND FROM BED TO CHAIR: TOTAL
DRESSING REGULAR LOWER BODY CLOTHING: TOTAL
TURNING FROM BACK TO SIDE WHILE IN FLAT BAD: TOTAL
WALKING IN HOSPITAL ROOM: TOTAL
TOILETING: TOTAL
STANDING UP FROM CHAIR USING ARMS: TOTAL
DAILY ACTIVITIY SCORE: 6
EATING MEALS: TOTAL
PERSONAL GROOMING: TOTAL
EATING MEALS: TOTAL
STANDING UP FROM CHAIR USING ARMS: TOTAL
MOVING TO AND FROM BED TO CHAIR: TOTAL
MOBILITY SCORE: 6
HELP NEEDED FOR BATHING: TOTAL
DAILY ACTIVITIY SCORE: 6
HELP NEEDED FOR BATHING: TOTAL
CLIMB 3 TO 5 STEPS WITH RAILING: TOTAL
DRESSING REGULAR UPPER BODY CLOTHING: TOTAL

## 2024-04-05 ASSESSMENT — PAIN - FUNCTIONAL ASSESSMENT
PAIN_FUNCTIONAL_ASSESSMENT: CPOT (CRITICAL CARE PAIN OBSERVATION TOOL)

## 2024-04-05 ASSESSMENT — PAIN SCALES - GENERAL
PAINLEVEL_OUTOF10: 0 - NO PAIN

## 2024-04-05 NOTE — DOCUMENTATION CLARIFICATION NOTE
PATIENT:               PIPER CERVANTES  ACCT #:                  [id number]  MRN:                       [id number]  :                       1960  ADMIT DATE:       2024 1:28 AM  DISCH DATE:  RESPONDING PROVIDER #:        [id number]          PROVIDER RESPONSE TEXT:    Pneumonia ruled out after work up    CDI QUERY TEXT:    [facility]_Pneumonia Specificity        Instruction:    Based on your assessment of the patient and the clinical information, please provide the requested documentation by clicking on the appropriate radio button and enter any additional information if prompted.    Question: Please further specify the type of pneumonia being treated      When answering this query, please exercise your independent professional judgment. The fact that a question is being asked, does not imply that any particular answer is desired or expected.    The patient's clinical indicators include:  Clinical Information: 62 yo male with cardiac arrest due to respiratory failure in the field.    Clinical Indicators:   Procalcitonin 12.53 WBC 14.2   Legionella and Strep pneumoniae negative   chest xray  Impression:  Patchy bilateral airspace disease.  Congestive failure vs. pneumonia.  Correlate clinically.  Endotracheal tube tip is approximately 4.5 cm above the mee.  Nasogastric tube tip identified to the distal esophagus.  Continued  advancement recommended.     H+P  Ordered pneumonia assessment with procalcitonin, Legionella and strep pneumoniae urine antigen  Infectious Disease:  #Questionable community acquired pneumonia    Treatment:  Duoneb, Methylprednisolone  Zosyn 4.5 gm IV Q8hrs -4/3  Zithromax 500 mg IV   Rocephin 2 gm IV Q24hrs 4/3-  Vancomycin 2gm IV     Risk Factors: Acute resp failure, COPD exac, HARRIETT, parkinson's , obesity, anoxic brain injury  Options provided:  -- Aspiration PNA  -- Gram Negative PNA  -- Pneumonia ruled out after work up  -- Other - I will add my own diagnosis  --  Refer to Clinical Documentation Reviewer    Query created by: Pao Alberts on 4/5/2024 7:49 AM      Electronically signed by:  WALDEMAR MATOS DO 4/5/2024 9:54 AM

## 2024-04-05 NOTE — CARE PLAN
The patient's goals for the shift include  Patient to remain safe and free from injury with pain and comfort managed and maintained    The clinical goals for the shift include Patient will remain hemodynamically stable throughout the shift    Over the shift, the patient did not make progress toward the following goals. Barriers to progression include impaired neuro, impaired mobility, . Recommendations to address these barriers include   Problem: Pain  Goal: My pain/discomfort is manageable  Outcome: Progressing     Problem: Psychosocial Needs  Goal: Demonstrates ability to cope with hospitalization/illness  Outcome: Progressing  Goal: Collaborate with me, my family, and caregiver to identify my specific goals  Outcome: Progressing     Problem: Discharge Barriers  Goal: My discharge needs are met  Outcome: Progressing     Problem: Skin  Goal: Participates in plan/prevention/treatment measures  Outcome: Progressing  Flowsheets (Taken 4/5/2024 0032)  Participates in plan/prevention/treatment measures:   Discuss with provider PT/OT consult   Elevate heels   Increase activity/out of bed for meals  Goal: Prevent/manage excess moisture  Outcome: Progressing  Flowsheets (Taken 4/5/2024 0032)  Prevent/manage excess moisture:   Cleanse incontinence/protect with barrier cream   Monitor for/manage infection if present   Follow provider orders for dressing changes   Use wicking fabric (obtain order)  Goal: Prevent/minimize sheer/friction injuries  Outcome: Progressing  Flowsheets (Taken 4/5/2024 0032)  Prevent/minimize sheer/friction injuries:   Complete micro-shifts as needed if patient unable. Adjust patient position to relieve pressure points, not a full turn   Increase activity/out of bed for meals   Use pull sheet   HOB 30 degrees or less   Turn/reposition every 2 hours/use positioning/transfer devices  Goal: Promote/optimize nutrition  Outcome: Progressing  Flowsheets (Taken 4/5/2024 0032)  Promote/optimize nutrition:    Assist with feeding   Monitor/record intake including meals   Offer water/supplements/favorite foods  Goal: Promote skin healing  Outcome: Progressing  Flowsheets (Taken 4/5/2024 0032)  Promote skin healing:   Protective dressings over bony prominences   Assess skin/pad under line(s)/device(s)   Turn/reposition every 2 hours/use positioning/transfer devices   Ensure correct size (line/device) and apply per  instructions     Problem: Discharge Planning  Goal: Discharge to home or other facility with appropriate resources  Outcome: Progressing     Problem: Chronic Conditions and Co-morbidities  Goal: Patient's chronic conditions and co-morbidity symptoms are monitored and maintained or improved  Outcome: Progressing     Problem: Diabetes  Goal: Achieve decreasing blood glucose levels by end of shift  Outcome: Progressing  Goal: Increase stability of blood glucose readings by end of shift  Outcome: Progressing  Goal: Decrease in ketones present in urine by end of shift  Outcome: Progressing  Goal: Maintain electrolyte levels within acceptable range throughout shift  Outcome: Progressing  Goal: Maintain glucose levels >70mg/dl to <250mg/dl throughout shift  Outcome: Progressing  Goal: No changes in neurological exam by end of shift  Outcome: Progressing  Goal: Learn about and adhere to nutrition recommendations by end of shift  Outcome: Progressing  Goal: Vital signs within normal range for age by end of shift  Outcome: Progressing  Goal: Increase self care and/or family involovement by end of shift  Outcome: Progressing  Goal: Receive DSME education by end of shift  Outcome: Progressing     Problem: Mechanical Ventilation  Goal: Tracheostomy will be managed safely  Outcome: Progressing  Goal: Ability to express needs and understand communication  Outcome: Progressing  Goal: Mobility/activity is maintained at optimum level for patient  Outcome: Progressing   .

## 2024-04-05 NOTE — PROGRESS NOTES
04/05/24 1429   Discharge Planning   Patient expects to be discharged to: Pt. remains unresponsive.  [facility] is on case.

## 2024-04-05 NOTE — PROGRESS NOTES
Gilberto Albarran is a 63 y.o. male on day 4 of admission presenting with Cardiac arrest (CMS/HCC).    Subjective   Admitted s/p cardiopulmonary arrest.  [facility] following.       Objective     Physical Exam  Constitutional:       Comments: Unresponsive, GCS 3T   HENT:      Head:      Comments: ETT in position.  Eyes:      Comments: No pupillary response.   Cardiovascular:      Rate and Rhythm: Normal rate and regular rhythm.      Pulses: Normal pulses.   Pulmonary:      Comments: CTAB/L  Abdominal:      Palpations: Abdomen is soft.      Comments: Hypoactive bowel sounds.   Neurological:      Mental Status: He is unresponsive.      Comments: No pupillary response.  No response to painful stimuli.           Last Recorded Vitals  Blood pressure 151/55, pulse 70, temperature 36.6 °C (97.9 °F), temperature source Temporal, resp. rate 24, weight 132 kg (292 lb), SpO2 96 %.  Intake/Output last 3 Shifts:  I/O last 3 completed shifts:  In: 1463.8 (11.1 mL/kg) [I.V.:163.8 (1.2 mL/kg); NG/GT:1200; IV Piggyback:100]  Out: 6425 (48.5 mL/kg) [Urine:5825 (1.2 mL/kg/hr); Emesis/NG output:600]  Weight: 132.4 kg     Relevant Results  Scheduled medications  amLODIPine, 10 mg, oral, Daily  atenolol, 100 mg, oral, Daily  atorvastatin, 10 mg, oral, Nightly  budesonide, 0.5 mg, nebulization, BID  cefTRIAXone, 2 g, intravenous, q24h  furosemide, 20 mg, intravenous, BID  heparin (porcine), 7,500 Units, subcutaneous, q8h YANIQUE  insulin glargine, 15 Units, subcutaneous, q24h  insulin lispro, 0-15 Units, subcutaneous, q4h  losartan, 100 mg, oral, Daily  montelukast, 10 mg, oral, Nightly  pantoprazole, 40 mg, intravenous, Daily before breakfast  polyethylene glycol, 17 g, oral, Daily  predniSONE, 40 mg, oral, Daily      Continuous medications  dexmedeTOMIDine, 0.1-1.5 mcg/kg/hr, Last Rate: Stopped (04/03/24 0900)      Results for orders placed or performed during the hospital encounter of 04/01/24 (from the past 24 hour(s))   POCT GLUCOSE   Result  Value Ref Range    POCT Glucose 194 (H) 74 - 99 mg/dL   POCT GLUCOSE   Result Value Ref Range    POCT Glucose 211 (H) 74 - 99 mg/dL   POCT GLUCOSE   Result Value Ref Range    POCT Glucose 220 (H) 74 - 99 mg/dL   POCT GLUCOSE   Result Value Ref Range    POCT Glucose 204 (H) 74 - 99 mg/dL   Sars-CoV-2 PCR   Result Value Ref Range    Coronavirus 2019, PCR Not Detected Not Detected   Urinalysis with Reflex Culture and Microscopic   Result Value Ref Range    Color, Urine Yellow Straw, Yellow    Appearance, Urine Clear Clear    Specific Gravity, Urine 1.012 1.005 - 1.035    pH, Urine 7.0 5.0, 5.5, 6.0, 6.5, 7.0, 7.5, 8.0    Protein, Urine NEGATIVE NEGATIVE mg/dL    Glucose, Urine NEGATIVE NEGATIVE mg/dL    Blood, Urine NEGATIVE NEGATIVE    Ketones, Urine NEGATIVE NEGATIVE mg/dL    Bilirubin, Urine NEGATIVE NEGATIVE    Urobilinogen, Urine <2.0 <2.0 mg/dL    Nitrite, Urine NEGATIVE NEGATIVE    Leukocyte Esterase, Urine NEGATIVE NEGATIVE   Extra Urine Gray Tube   Result Value Ref Range    Extra Tube Hold for add-ons.    Amylase   Result Value Ref Range    Amylase 63 29 - 103 U/L   Lipase   Result Value Ref Range    Lipase 12 9 - 82 U/L   POCT GLUCOSE   Result Value Ref Range    POCT Glucose 143 (H) 74 - 99 mg/dL   Basic metabolic panel   Result Value Ref Range    Glucose 165 (H) 74 - 99 mg/dL    Sodium 139 136 - 145 mmol/L    Potassium 4.0 3.5 - 5.3 mmol/L    Chloride 100 98 - 107 mmol/L    Bicarbonate 32 21 - 32 mmol/L    Anion Gap 11 10 - 20 mmol/L    Urea Nitrogen 37 (H) 6 - 23 mg/dL    Creatinine 1.03 0.50 - 1.30 mg/dL    eGFR 82 >60 mL/min/1.73m*2    Calcium 8.8 8.6 - 10.3 mg/dL   Phosphorus   Result Value Ref Range    Phosphorus 2.9 2.5 - 4.9 mg/dL   Magnesium   Result Value Ref Range    Magnesium 1.95 1.60 - 2.40 mg/dL   Lactate   Result Value Ref Range    Lactate 0.8 0.4 - 2.0 mmol/L   Hepatic function panel   Result Value Ref Range    Albumin 3.3 (L) 3.4 - 5.0 g/dL    Bilirubin, Total 0.4 0.0 - 1.2 mg/dL     Bilirubin, Direct 0.1 0.0 - 0.3 mg/dL    Alkaline Phosphatase 67 33 - 136 U/L    ALT 30 10 - 52 U/L    AST 63 (H) 9 - 39 U/L    Total Protein 6.3 (L) 6.4 - 8.2 g/dL   Amylase   Result Value Ref Range    Amylase 72 29 - 103 U/L   Lipase   Result Value Ref Range    Lipase 13 9 - 82 U/L   Calcium, ionized   Result Value Ref Range    POCT Calcium, Ionized 1.15 1.1 - 1.33 mmol/L   POCT GLUCOSE   Result Value Ref Range    POCT Glucose 179 (H) 74 - 99 mg/dL   Urinalysis with Reflex Culture and Microscopic   Result Value Ref Range    Color, Urine Yellow Straw, Yellow    Appearance, Urine Clear Clear    Specific Gravity, Urine 1.013 1.005 - 1.035    pH, Urine 7.0 5.0, 5.5, 6.0, 6.5, 7.0, 7.5, 8.0    Protein, Urine NEGATIVE NEGATIVE mg/dL    Glucose, Urine NEGATIVE NEGATIVE mg/dL    Blood, Urine SMALL (1+) (A) NEGATIVE    Ketones, Urine NEGATIVE NEGATIVE mg/dL    Bilirubin, Urine NEGATIVE NEGATIVE    Urobilinogen, Urine <2.0 <2.0 mg/dL    Nitrite, Urine NEGATIVE NEGATIVE    Leukocyte Esterase, Urine NEGATIVE NEGATIVE   Urinalysis Microscopic   Result Value Ref Range    WBC, Urine NONE 1-5, NONE /HPF    RBC, Urine 1-2 NONE, 1-2, 3-5 /HPF   Coagulation Screen   Result Value Ref Range    Protime 12.7 9.8 - 12.8 seconds    INR 1.1 0.9 - 1.1    aPTT 31 27 - 38 seconds   POCT GLUCOSE   Result Value Ref Range    POCT Glucose 167 (H) 74 - 99 mg/dL   Basic metabolic panel   Result Value Ref Range    Glucose 182 (H) 74 - 99 mg/dL    Sodium 141 136 - 145 mmol/L    Potassium 4.0 3.5 - 5.3 mmol/L    Chloride 100 98 - 107 mmol/L    Bicarbonate 33 (H) 21 - 32 mmol/L    Anion Gap 12 10 - 20 mmol/L    Urea Nitrogen 36 (H) 6 - 23 mg/dL    Creatinine 1.14 0.50 - 1.30 mg/dL    eGFR 72 >60 mL/min/1.73m*2    Calcium 8.8 8.6 - 10.3 mg/dL   Calcium, ionized   Result Value Ref Range    POCT Calcium, Ionized 1.15 1.1 - 1.33 mmol/L   Phosphorus   Result Value Ref Range    Phosphorus 3.2 2.5 - 4.9 mg/dL   Magnesium   Result Value Ref Range    Magnesium  1.98 1.60 - 2.40 mg/dL   Lactate   Result Value Ref Range    Lactate 0.8 0.4 - 2.0 mmol/L   Hepatic function panel   Result Value Ref Range    Albumin 3.2 (L) 3.4 - 5.0 g/dL    Bilirubin, Total 0.5 0.0 - 1.2 mg/dL    Bilirubin, Direct 0.1 0.0 - 0.3 mg/dL    Alkaline Phosphatase 73 33 - 136 U/L    ALT 33 10 - 52 U/L    AST 69 (H) 9 - 39 U/L    Total Protein 6.2 (L) 6.4 - 8.2 g/dL      PRN medications  PRN medications: acetaminophen **OR** acetaminophen, dextrose, dextrose, glucagon, ipratropium-albuteroL, magnesium sulfate, magnesium sulfate, oxygen, potassium chloride CR **OR** potassium chloride, potassium chloride             This patient currently has cardiac telemetry ordered; if you would like to modify or discontinue the telemetry order, click here to go to the orders activity to modify/discontinue the order.    This patient has a urinary catheter   Reason for the urinary catheter remaining today? critically ill patient who need accurate urinary output measurements               Assessment/Plan   Principal Problem:    Cardiac arrest (CMS/HCC)  Active Problems:    COPD exacerbation (CMS/[facility], located within [facility])    HTN (hypertension)    Hyperlipidemia    Class 3 severe obesity due to excess calories with body mass index (BMI) of 40.0 to 44.9 in adult (CMS/HCC)    Parkinson disease (CMS/HCC)    Type 2 diabetes mellitus (CMS/HCC)    Acute hypoxic respiratory failure (CMS/[facility], located within [facility])  Anoxic encephalopathy  Family has elected for [facility] and organ procurement.         I spent 32 minutes in the professional and overall care of this patient.      Antoine Angel, DO

## 2024-04-05 NOTE — PROGRESS NOTES
Gilberto Albarran is a 63 y.o. male on day 4 of admission presenting with Cardiac arrest (CMS/HCC).    Subjective   Symptoms (0 - 10, Best to Worst)  [place] Symptom Assessment System  Pain Score: 0 - No pain         Objective   Patient is unresponsive.    Physical Exam  HENT:      Head: Normocephalic.      Nose: Nose normal.      Mouth/Throat:      Mouth: Mucous membranes are moist.      Pharynx: Oropharynx is clear.   Eyes:      Conjunctiva/sclera: Conjunctivae normal.   Cardiovascular:      Rate and Rhythm: Normal rate.      Pulses: Normal pulses.   Pulmonary:      Comments: Patient is on mechanical ventilation  Musculoskeletal:         General: Swelling present.      Cervical back: Normal range of motion.   Skin:     General: Skin is warm and dry.      Capillary Refill: Capillary refill takes less than 2 seconds.   Neurological:      Sensory: Sensory deficit present.         Last Recorded Vitals  Blood pressure 151/55, pulse 70, temperature 36.6 °C (97.9 °F), temperature source Temporal, resp. rate 24, weight 132 kg (292 lb), SpO2 98 %.  Intake/Output last 3 Shifts:  I/O last 3 completed shifts:  In: 1463.8 (11.1 mL/kg) [I.V.:163.8 (1.2 mL/kg); NG/GT:1200; IV Piggyback:100]  Out: 6425 (48.5 mL/kg) [Urine:5825 (1.2 mL/kg/hr); Emesis/NG output:600]  Weight: 132.4 kg             Assessment/Plan   IMP:  Patient is with [facility] and at this time Palliative Care is following for emotional and spiritual support.      Patient/proxy preference for information  Prefers full information    Goals of Care  Provide support for family      Other Palliative Support  Emotional and spiritual support.        I spent an hour with family today    ALYX Saleh-CNS

## 2024-04-05 NOTE — CARE PLAN
The patient's goals for the shift include      The clinical goals for the shift include Patient will remain hemodynamically stable throughout the shift      Problem: Pain  Goal: My pain/discomfort is manageable  Outcome: Progressing     Problem: Psychosocial Needs  Goal: Demonstrates ability to cope with hospitalization/illness  Outcome: Progressing  Goal: Collaborate with me, my family, and caregiver to identify my specific goals  Outcome: Progressing     Problem: Discharge Barriers  Goal: My discharge needs are met  Outcome: Progressing     Problem: Skin  Goal: Participates in plan/prevention/treatment measures  Outcome: Progressing  Flowsheets (Taken 4/5/2024 0032 by Billy uS RN)  Participates in plan/prevention/treatment measures:   Discuss with provider PT/OT consult   Elevate heels   Increase activity/out of bed for meals  Goal: Prevent/manage excess moisture  Outcome: Progressing  Flowsheets (Taken 4/5/2024 0032 by Billy Su RN)  Prevent/manage excess moisture:   Cleanse incontinence/protect with barrier cream   Monitor for/manage infection if present   Follow provider orders for dressing changes   Use wicking fabric (obtain order)  Goal: Prevent/minimize sheer/friction injuries  Outcome: Progressing  Flowsheets (Taken 4/5/2024 0032 by Billy Su RN)  Prevent/minimize sheer/friction injuries:   Complete micro-shifts as needed if patient unable. Adjust patient position to relieve pressure points, not a full turn   Increase activity/out of bed for meals   Use pull sheet   HOB 30 degrees or less   Turn/reposition every 2 hours/use positioning/transfer devices  Goal: Promote/optimize nutrition  Outcome: Progressing  Flowsheets (Taken 4/5/2024 0032 by Billy Su RN)  Promote/optimize nutrition:   Assist with feeding   Monitor/record intake including meals   Offer water/supplements/favorite foods  Goal: Promote skin healing  Outcome: Progressing  Flowsheets (Taken 4/5/2024 0032 by Billy Su  RN)  Promote skin healing:   Protective dressings over bony prominences   Assess skin/pad under line(s)/device(s)   Turn/reposition every 2 hours/use positioning/transfer devices   Ensure correct size (line/device) and apply per  instructions     Problem: Discharge Planning  Goal: Discharge to home or other facility with appropriate resources  Outcome: Progressing     Problem: Chronic Conditions and Co-morbidities  Goal: Patient's chronic conditions and co-morbidity symptoms are monitored and maintained or improved  Outcome: Progressing     Problem: Diabetes  Goal: Achieve decreasing blood glucose levels by end of shift  Outcome: Progressing  Goal: Increase stability of blood glucose readings by end of shift  Outcome: Progressing  Goal: Decrease in ketones present in urine by end of shift  Outcome: Progressing  Goal: Maintain electrolyte levels within acceptable range throughout shift  Outcome: Progressing  Goal: Maintain glucose levels >70mg/dl to <250mg/dl throughout shift  Outcome: Progressing  Goal: No changes in neurological exam by end of shift  Outcome: Progressing  Goal: Learn about and adhere to nutrition recommendations by end of shift  Outcome: Progressing  Goal: Vital signs within normal range for age by end of shift  Outcome: Progressing  Goal: Increase self care and/or family involovement by end of shift  Outcome: Progressing  Goal: Receive DSME education by end of shift  Outcome: Progressing     Problem: Mechanical Ventilation  Goal: Tracheostomy will be managed safely  Outcome: Progressing  Goal: Ability to express needs and understand communication  Outcome: Progressing  Goal: Mobility/activity is maintained at optimum level for patient  Outcome: Progressing

## 2024-04-05 NOTE — NURSING NOTE
[place] office contacted prior to cardiac death for [facility]. [facility] investigator states pt will not be followed/accepted as 's case, but will still needs to be officially notified after cardiac death. [facility] notified, on unit.

## 2024-04-05 NOTE — SIGNIFICANT EVENT
The patient's family has decided to pursue [facility] donation.  Per the [facility] DCD donor guide the following testings were ordered.  Required labs every 8 hours, BMP, CBC with differential, ionized calcium, phosphate, magnesium, lactate, liver function panel, direct bilirubin ordered every 8 hours.  PT/INR and PTT ordered every 12 hours.  Per protocol amylase, lipase, urinalysis with microscopy ordered every 24 hours.  COVID nasal swab ordered, to be repeated every 72 hours as necessary.  Additionally request for an abdominal CT without contrast was ordered per [facility] DCD donor guide.      Discussed and reviewed with attending physician,      Morgan Palumbo, DO  PGY-2 Emergency Medicine

## 2024-04-06 VITALS
BODY MASS INDEX: 41.5 KG/M2 | SYSTOLIC BLOOD PRESSURE: 151 MMHG | DIASTOLIC BLOOD PRESSURE: 55 MMHG | TEMPERATURE: 98.2 F | OXYGEN SATURATION: 98 % | WEIGHT: 281 LBS | RESPIRATION RATE: 23 BRPM | HEART RATE: 64 BPM

## 2024-04-06 LAB
ABO GROUP (TYPE) IN BLOOD: NORMAL
ALBUMIN SERPL BCP-MCNC: 3.1 G/DL (ref 3.4–5)
ALBUMIN SERPL BCP-MCNC: 3.2 G/DL (ref 3.4–5)
ALP SERPL-CCNC: 75 U/L (ref 33–136)
ALP SERPL-CCNC: 76 U/L (ref 33–136)
ALT SERPL W P-5'-P-CCNC: 48 U/L (ref 10–52)
ALT SERPL W P-5'-P-CCNC: 53 U/L (ref 10–52)
AMYLASE SERPL-CCNC: 90 U/L (ref 29–103)
ANION GAP SERPL CALC-SCNC: 11 MMOL/L (ref 10–20)
ANION GAP SERPL CALC-SCNC: 13 MMOL/L (ref 10–20)
ANTIBODY SCREEN: NORMAL
APPEARANCE UR: CLEAR
APPEARANCE UR: CLEAR
APTT PPP: 33 SECONDS (ref 27–38)
AST SERPL W P-5'-P-CCNC: 75 U/L (ref 9–39)
AST SERPL W P-5'-P-CCNC: 77 U/L (ref 9–39)
ATRIAL RATE: 126 BPM
BILIRUB DIRECT SERPL-MCNC: 0.1 MG/DL (ref 0–0.3)
BILIRUB DIRECT SERPL-MCNC: 0.1 MG/DL (ref 0–0.3)
BILIRUB SERPL-MCNC: 0.5 MG/DL (ref 0–1.2)
BILIRUB SERPL-MCNC: 0.5 MG/DL (ref 0–1.2)
BILIRUB UR STRIP.AUTO-MCNC: NEGATIVE MG/DL
BILIRUB UR STRIP.AUTO-MCNC: NEGATIVE MG/DL
BUN SERPL-MCNC: 38 MG/DL (ref 6–23)
BUN SERPL-MCNC: 39 MG/DL (ref 6–23)
CA-I BLD-SCNC: 1.14 MMOL/L (ref 1.1–1.33)
CA-I BLD-SCNC: 1.15 MMOL/L (ref 1.1–1.33)
CALCIUM SERPL-MCNC: 8.5 MG/DL (ref 8.6–10.3)
CALCIUM SERPL-MCNC: 8.8 MG/DL (ref 8.6–10.3)
CHLORIDE SERPL-SCNC: 103 MMOL/L (ref 98–107)
CHLORIDE SERPL-SCNC: 103 MMOL/L (ref 98–107)
CO2 SERPL-SCNC: 31 MMOL/L (ref 21–32)
CO2 SERPL-SCNC: 33 MMOL/L (ref 21–32)
COLOR UR: YELLOW
COLOR UR: YELLOW
CREAT SERPL-MCNC: 1.14 MG/DL (ref 0.5–1.3)
CREAT SERPL-MCNC: 1.18 MG/DL (ref 0.5–1.3)
EGFRCR SERPLBLD CKD-EPI 2021: 69 ML/MIN/1.73M*2
EGFRCR SERPLBLD CKD-EPI 2021: 72 ML/MIN/1.73M*2
GLUCOSE BLD MANUAL STRIP-MCNC: 161 MG/DL (ref 74–99)
GLUCOSE BLD MANUAL STRIP-MCNC: 164 MG/DL (ref 74–99)
GLUCOSE BLD MANUAL STRIP-MCNC: 192 MG/DL (ref 74–99)
GLUCOSE SERPL-MCNC: 174 MG/DL (ref 74–99)
GLUCOSE SERPL-MCNC: 182 MG/DL (ref 74–99)
GLUCOSE UR STRIP.AUTO-MCNC: NEGATIVE MG/DL
GLUCOSE UR STRIP.AUTO-MCNC: NEGATIVE MG/DL
HOLD SPECIMEN: NORMAL
HOLD SPECIMEN: NORMAL
INR PPP: 1.2 (ref 0.9–1.1)
KETONES UR STRIP.AUTO-MCNC: NEGATIVE MG/DL
KETONES UR STRIP.AUTO-MCNC: NEGATIVE MG/DL
LACTATE SERPL-SCNC: 0.8 MMOL/L (ref 0.4–2)
LACTATE SERPL-SCNC: 0.8 MMOL/L (ref 0.4–2)
LEUKOCYTE ESTERASE UR QL STRIP.AUTO: NEGATIVE
LEUKOCYTE ESTERASE UR QL STRIP.AUTO: NEGATIVE
LIPASE SERPL-CCNC: 26 U/L (ref 9–82)
MAGNESIUM SERPL-MCNC: 1.97 MG/DL (ref 1.6–2.4)
MAGNESIUM SERPL-MCNC: 2.08 MG/DL (ref 1.6–2.4)
NITRITE UR QL STRIP.AUTO: NEGATIVE
NITRITE UR QL STRIP.AUTO: NEGATIVE
P AXIS: 77 DEGREES
P OFFSET: 185 MS
P ONSET: 151 MS
PH UR STRIP.AUTO: 7 [PH]
PH UR STRIP.AUTO: 7 [PH]
PHOSPHATE SERPL-MCNC: 2.9 MG/DL (ref 2.5–4.9)
PHOSPHATE SERPL-MCNC: 3.3 MG/DL (ref 2.5–4.9)
POTASSIUM SERPL-SCNC: 3.6 MMOL/L (ref 3.5–5.3)
POTASSIUM SERPL-SCNC: 3.9 MMOL/L (ref 3.5–5.3)
PR INTERVAL: 128 MS
PROT SERPL-MCNC: 6 G/DL (ref 6.4–8.2)
PROT SERPL-MCNC: 6.1 G/DL (ref 6.4–8.2)
PROT UR STRIP.AUTO-MCNC: NEGATIVE MG/DL
PROT UR STRIP.AUTO-MCNC: NEGATIVE MG/DL
PROTHROMBIN TIME: 13.5 SECONDS (ref 9.8–12.8)
Q ONSET: 215 MS
QRS COUNT: 20 BEATS
QRS DURATION: 160 MS
QT INTERVAL: 312 MS
QTC CALCULATION(BAZETT): 451 MS
QTC FREDERICIA: 399 MS
R AXIS: -38 DEGREES
RBC # UR STRIP.AUTO: NEGATIVE /UL
RBC # UR STRIP.AUTO: NEGATIVE /UL
RH FACTOR (ANTIGEN D): NORMAL
SODIUM SERPL-SCNC: 143 MMOL/L (ref 136–145)
SODIUM SERPL-SCNC: 143 MMOL/L (ref 136–145)
SP GR UR STRIP.AUTO: 1.01
SP GR UR STRIP.AUTO: 1.01
T AXIS: 144 DEGREES
T OFFSET: 371 MS
UROBILINOGEN UR STRIP.AUTO-MCNC: <2 MG/DL
UROBILINOGEN UR STRIP.AUTO-MCNC: <2 MG/DL
VENTRICULAR RATE: 126 BPM

## 2024-04-06 PROCEDURE — 2500000002 HC RX 250 W HCPCS SELF ADMINISTERED DRUGS (ALT 637 FOR MEDICARE OP, ALT 636 FOR OP/ED)

## 2024-04-06 PROCEDURE — 85730 THROMBOPLASTIN TIME PARTIAL: CPT

## 2024-04-06 PROCEDURE — 84100 ASSAY OF PHOSPHORUS: CPT

## 2024-04-06 PROCEDURE — C1819 TISSUE LOCALIZATION-EXCISION: HCPCS

## 2024-04-06 PROCEDURE — 3600000007 HC OR TIME - EACH INCREMENTAL 1 MINUTE - PROCEDURE LEVEL TWO

## 2024-04-06 PROCEDURE — 3600000002 HC OR TIME - INITIAL BASE CHARGE - PROCEDURE LEVEL TWO

## 2024-04-06 PROCEDURE — C9113 INJ PANTOPRAZOLE SODIUM, VIA: HCPCS

## 2024-04-06 PROCEDURE — 83690 ASSAY OF LIPASE: CPT

## 2024-04-06 PROCEDURE — 83605 ASSAY OF LACTIC ACID: CPT

## 2024-04-06 PROCEDURE — 2500000004 HC RX 250 GENERAL PHARMACY W/ HCPCS (ALT 636 FOR OP/ED)

## 2024-04-06 PROCEDURE — 2500000001 HC RX 250 WO HCPCS SELF ADMINISTERED DRUGS (ALT 637 FOR MEDICARE OP)

## 2024-04-06 PROCEDURE — 37799 UNLISTED PX VASCULAR SURGERY: CPT

## 2024-04-06 PROCEDURE — 2720000007 HC OR 272 NO HCPCS

## 2024-04-06 PROCEDURE — 94640 AIRWAY INHALATION TREATMENT: CPT

## 2024-04-06 PROCEDURE — 82248 BILIRUBIN DIRECT: CPT

## 2024-04-06 PROCEDURE — 94003 VENT MGMT INPAT SUBQ DAY: CPT

## 2024-04-06 PROCEDURE — 2500000004 HC RX 250 GENERAL PHARMACY W/ HCPCS (ALT 636 FOR OP/ED): Performed by: EMERGENCY MEDICINE

## 2024-04-06 PROCEDURE — 82150 ASSAY OF AMYLASE: CPT

## 2024-04-06 PROCEDURE — 82947 ASSAY GLUCOSE BLOOD QUANT: CPT

## 2024-04-06 PROCEDURE — 83735 ASSAY OF MAGNESIUM: CPT

## 2024-04-06 PROCEDURE — 82330 ASSAY OF CALCIUM: CPT

## 2024-04-06 PROCEDURE — 99238 HOSP IP/OBS DSCHRG MGMT 30/<: CPT | Performed by: EMERGENCY MEDICINE

## 2024-04-06 PROCEDURE — 86901 BLOOD TYPING SEROLOGIC RH(D): CPT | Performed by: EMERGENCY MEDICINE

## 2024-04-06 PROCEDURE — 81003 URINALYSIS AUTO W/O SCOPE: CPT

## 2024-04-06 PROCEDURE — 99291 CRITICAL CARE FIRST HOUR: CPT | Performed by: EMERGENCY MEDICINE

## 2024-04-06 RX ORDER — LORAZEPAM 2 MG/ML
2 INJECTION INTRAMUSCULAR ONCE
Status: COMPLETED | OUTPATIENT
Start: 2024-04-06 | End: 2024-04-06

## 2024-04-06 RX ORDER — LORAZEPAM 2 MG/ML
2 INJECTION INTRAMUSCULAR
Status: DISCONTINUED | OUTPATIENT
Start: 2024-04-06 | End: 2024-04-06 | Stop reason: HOSPADM

## 2024-04-06 RX ORDER — MORPHINE SULFATE 4 MG/ML
4 INJECTION, SOLUTION INTRAMUSCULAR; INTRAVENOUS ONCE
Status: COMPLETED | OUTPATIENT
Start: 2024-04-06 | End: 2024-04-06

## 2024-04-06 RX ORDER — MORPHINE SULFATE 4 MG/ML
4 INJECTION, SOLUTION INTRAMUSCULAR; INTRAVENOUS
Status: DISCONTINUED | OUTPATIENT
Start: 2024-04-06 | End: 2024-04-06 | Stop reason: HOSPADM

## 2024-04-06 RX ORDER — MORPHINE SULFATE IN 0.9 % NACL 30 MG/30ML
10 PATIENT CONTROLLED ANALGESIA SYRINGE INTRAVENOUS CONTINUOUS
Status: DISCONTINUED | OUTPATIENT
Start: 2024-04-06 | End: 2024-04-06 | Stop reason: HOSPADM

## 2024-04-06 RX ADMIN — INSULIN LISPRO 3 UNITS: 100 INJECTION, SOLUTION INTRAVENOUS; SUBCUTANEOUS at 03:15

## 2024-04-06 RX ADMIN — PREDNISONE 40 MG: 20 TABLET ORAL at 07:56

## 2024-04-06 RX ADMIN — FUROSEMIDE 20 MG: 10 INJECTION, SOLUTION INTRAMUSCULAR; INTRAVENOUS at 07:56

## 2024-04-06 RX ADMIN — POLYETHYLENE GLYCOL 3350 17 G: 17 POWDER, FOR SOLUTION ORAL at 07:56

## 2024-04-06 RX ADMIN — LOSARTAN POTASSIUM 100 MG: 100 TABLET, FILM COATED ORAL at 07:56

## 2024-04-06 RX ADMIN — LORAZEPAM 2 MG: 2 INJECTION, SOLUTION INTRAMUSCULAR; INTRAVENOUS at 12:54

## 2024-04-06 RX ADMIN — MORPHINE SULFATE 10 MG/HR: 10 INJECTION, SOLUTION INTRAMUSCULAR; INTRAVENOUS at 09:26

## 2024-04-06 RX ADMIN — INSULIN LISPRO 3 UNITS: 100 INJECTION, SOLUTION INTRAVENOUS; SUBCUTANEOUS at 08:20

## 2024-04-06 RX ADMIN — ACETAMINOPHEN 650 MG: 325 TABLET ORAL at 07:56

## 2024-04-06 RX ADMIN — MORPHINE SULFATE 4 MG: 4 INJECTION, SOLUTION INTRAMUSCULAR; INTRAVENOUS at 12:54

## 2024-04-06 RX ADMIN — AMLODIPINE BESYLATE 10 MG: 10 TABLET ORAL at 07:56

## 2024-04-06 RX ADMIN — BUDESONIDE 0.5 MG: 0.5 INHALANT RESPIRATORY (INHALATION) at 08:50

## 2024-04-06 RX ADMIN — ATENOLOL 100 MG: 50 TABLET ORAL at 07:56

## 2024-04-06 RX ADMIN — PANTOPRAZOLE SODIUM 40 MG: 40 INJECTION, POWDER, FOR SOLUTION INTRAVENOUS at 05:03

## 2024-04-06 RX ADMIN — HEPARIN SODIUM 7500 UNITS: 5000 INJECTION INTRAVENOUS; SUBCUTANEOUS at 05:03

## 2024-04-06 ASSESSMENT — COGNITIVE AND FUNCTIONAL STATUS - GENERAL
EATING MEALS: TOTAL
TOILETING: TOTAL
STANDING UP FROM CHAIR USING ARMS: TOTAL
TURNING FROM BACK TO SIDE WHILE IN FLAT BAD: TOTAL
HELP NEEDED FOR BATHING: TOTAL
WALKING IN HOSPITAL ROOM: TOTAL
MOVING TO AND FROM BED TO CHAIR: TOTAL
PERSONAL GROOMING: TOTAL
DAILY ACTIVITIY SCORE: 6
DRESSING REGULAR LOWER BODY CLOTHING: TOTAL
MOVING FROM LYING ON BACK TO SITTING ON SIDE OF FLAT BED WITH BEDRAILS: TOTAL
DRESSING REGULAR UPPER BODY CLOTHING: TOTAL
CLIMB 3 TO 5 STEPS WITH RAILING: TOTAL
MOBILITY SCORE: 6

## 2024-04-06 ASSESSMENT — PAIN - FUNCTIONAL ASSESSMENT
PAIN_FUNCTIONAL_ASSESSMENT: CPOT (CRITICAL CARE PAIN OBSERVATION TOOL)

## 2024-04-06 NOTE — NURSING NOTE
Pt medicated with morphine and ativan prior to transfer to OR. Morphine drip continued to OR for comfort. Time out called. Pt transferred to OR table. Family present until extubation. Loading dose of IV Heparin 30, 000 units given per [facility] protocol. Pt extubated at 1334. PEA at 1342.     Pt pronounced at 1348 by Dr. Angel.  notified, will be releasing the body, no hold at this time. [facility] on site with pt in OR, present for expiration.

## 2024-04-06 NOTE — CARE PLAN
The patient's goals for the shift include  Remain safe and free from injury with comfort managed and maintained    The clinical goals for the shift include Patient will remain hemodyamically stable throughout the shift    Over the shift, the patient did not make progress toward the following goals. Barriers to progression include ventilator dependence, impaired neurological exam. Recommendations to address these barriers include   Problem: Pain  Goal: My pain/discomfort is manageable  Outcome: Progressing     Problem: Psychosocial Needs  Goal: Demonstrates ability to cope with hospitalization/illness  Outcome: Progressing  Goal: Collaborate with me, my family, and caregiver to identify my specific goals  Outcome: Progressing     Problem: Discharge Barriers  Goal: My discharge needs are met  Outcome: Progressing     Problem: Skin  Goal: Participates in plan/prevention/treatment measures  Outcome: Progressing  Flowsheets (Taken 4/6/2024 0022)  Participates in plan/prevention/treatment measures: Elevate heels  Goal: Prevent/manage excess moisture  Outcome: Progressing  Flowsheets (Taken 4/6/2024 0022)  Prevent/manage excess moisture:   Cleanse incontinence/protect with barrier cream   Monitor for/manage infection if present   Follow provider orders for dressing changes   Use wicking fabric (obtain order)  Goal: Prevent/minimize sheer/friction injuries  Outcome: Progressing  Flowsheets (Taken 4/6/2024 0022)  Prevent/minimize sheer/friction injuries:   Increase activity/out of bed for meals   Use pull sheet   HOB 30 degrees or less   Turn/reposition every 2 hours/use positioning/transfer devices  Goal: Promote/optimize nutrition  Outcome: Progressing  Flowsheets (Taken 4/6/2024 0022)  Promote/optimize nutrition:   Offer water/supplements/favorite foods   Discuss with provider if NPO > 2 days  Goal: Promote skin healing  Outcome: Progressing  Flowsheets (Taken 4/6/2024 0022)  Promote skin healing:   Protective dressings  over bony prominences   Turn/reposition every 2 hours/use positioning/transfer devices   Ensure correct size (line/device) and apply per  instructions   Rotate device position/do not position patient on device     Problem: Discharge Planning  Goal: Discharge to home or other facility with appropriate resources  Outcome: Progressing     Problem: Chronic Conditions and Co-morbidities  Goal: Patient's chronic conditions and co-morbidity symptoms are monitored and maintained or improved  Outcome: Progressing     Problem: Diabetes  Goal: Achieve decreasing blood glucose levels by end of shift  Outcome: Progressing  Goal: Increase stability of blood glucose readings by end of shift  Outcome: Progressing  Goal: Decrease in ketones present in urine by end of shift  Outcome: Progressing  Goal: Maintain electrolyte levels within acceptable range throughout shift  Outcome: Progressing  Goal: Maintain glucose levels >70mg/dl to <250mg/dl throughout shift  Outcome: Progressing  Goal: No changes in neurological exam by end of shift  Outcome: Progressing  Goal: Learn about and adhere to nutrition recommendations by end of shift  Outcome: Progressing  Goal: Vital signs within normal range for age by end of shift  Outcome: Progressing  Goal: Increase self care and/or family involovement by end of shift  Outcome: Progressing  Goal: Receive DSME education by end of shift  Outcome: Progressing     Problem: Mechanical Ventilation  Goal: Tracheostomy will be managed safely  Outcome: Progressing  Goal: Ability to express needs and understand communication  Outcome: Progressing  Goal: Mobility/activity is maintained at optimum level for patient  Outcome: Progressing   .

## 2024-04-06 NOTE — PROGRESS NOTES
Gilberto Albarran is a 63 y.o. male on day 5 of admission presenting with Cardiac arrest (CMS/HCC).    Subjective   To go to OR today for DCD.       Objective     Physical Exam  Constitutional:       Comments: Intubated, off sedation, unresponsive     Cardiovascular:      Rate and Rhythm: Normal rate and regular rhythm.   Pulmonary:      Breath sounds: Normal breath sounds.      Comments: Equal, bilateral breath sounds.  Abdominal:      Palpations: Abdomen is soft.   Neurological:      Comments: No response to painful stimuli, GCS 3T         Last Recorded Vitals  Blood pressure 151/55, pulse 64, temperature 37.4 °C (99.3 °F), temperature source Temporal, resp. rate 23, weight 127 kg (281 lb), SpO2 98 %.  Intake/Output last 3 Shifts:  I/O last 3 completed shifts:  In: 1850 (14.5 mL/kg) [NG/GT:1800; IV Piggyback:50]  Out: 5139 (40.3 mL/kg) [Urine:5139 (1.1 mL/kg/hr)]  Weight: 127.5 kg     Relevant Results  Scheduled medications  amLODIPine, 10 mg, oral, Daily  atenolol, 100 mg, oral, Daily  atorvastatin, 10 mg, oral, Nightly  budesonide, 0.5 mg, nebulization, BID  cefTRIAXone, 2 g, intravenous, q24h  furosemide, 20 mg, intravenous, BID  heparin (porcine), 7,500 Units, subcutaneous, q8h YANIQUE  insulin glargine, 15 Units, subcutaneous, q24h  insulin lispro, 0-15 Units, subcutaneous, q4h  losartan, 100 mg, oral, Daily  montelukast, 10 mg, oral, Nightly  pantoprazole, 40 mg, intravenous, Daily before breakfast  polyethylene glycol, 17 g, oral, Daily  predniSONE, 40 mg, oral, Daily      Results for orders placed or performed during the hospital encounter of 04/01/24 (from the past 24 hour(s))   POCT GLUCOSE   Result Value Ref Range    POCT Glucose 214 (H) 74 - 99 mg/dL   Basic metabolic panel   Result Value Ref Range    Glucose 226 (H) 74 - 99 mg/dL    Sodium 142 136 - 145 mmol/L    Potassium 3.9 3.5 - 5.3 mmol/L    Chloride 101 98 - 107 mmol/L    Bicarbonate 33 (H) 21 - 32 mmol/L    Anion Gap 12 10 - 20 mmol/L    Urea Nitrogen  40 (H) 6 - 23 mg/dL    Creatinine 1.16 0.50 - 1.30 mg/dL    eGFR 71 >60 mL/min/1.73m*2    Calcium 8.9 8.6 - 10.3 mg/dL   Calcium, ionized   Result Value Ref Range    POCT Calcium, Ionized 1.12 1.1 - 1.33 mmol/L   Phosphorus   Result Value Ref Range    Phosphorus 3.5 2.5 - 4.9 mg/dL   Magnesium   Result Value Ref Range    Magnesium 2.10 1.60 - 2.40 mg/dL   Lactate   Result Value Ref Range    Lactate 0.9 0.4 - 2.0 mmol/L   Hepatic function panel   Result Value Ref Range    Albumin 3.2 (L) 3.4 - 5.0 g/dL    Bilirubin, Total 0.4 0.0 - 1.2 mg/dL    Bilirubin, Direct 0.1 0.0 - 0.3 mg/dL    Alkaline Phosphatase 76 33 - 136 U/L    ALT 43 10 - 52 U/L    AST 70 (H) 9 - 39 U/L    Total Protein 6.1 (L) 6.4 - 8.2 g/dL   Coagulation Screen   Result Value Ref Range    Protime 13.2 (H) 9.8 - 12.8 seconds    INR 1.2 (H) 0.9 - 1.1    aPTT 33 27 - 38 seconds   POCT GLUCOSE   Result Value Ref Range    POCT Glucose 221 (H) 74 - 99 mg/dL   POCT GLUCOSE   Result Value Ref Range    POCT Glucose 192 (H) 74 - 99 mg/dL   POCT GLUCOSE   Result Value Ref Range    POCT Glucose 190 (H) 74 - 99 mg/dL   Basic metabolic panel   Result Value Ref Range    Glucose 182 (H) 74 - 99 mg/dL    Sodium 143 136 - 145 mmol/L    Potassium 3.6 3.5 - 5.3 mmol/L    Chloride 103 98 - 107 mmol/L    Bicarbonate 33 (H) 21 - 32 mmol/L    Anion Gap 11 10 - 20 mmol/L    Urea Nitrogen 39 (H) 6 - 23 mg/dL    Creatinine 1.14 0.50 - 1.30 mg/dL    eGFR 72 >60 mL/min/1.73m*2    Calcium 8.8 8.6 - 10.3 mg/dL   Calcium, ionized   Result Value Ref Range    POCT Calcium, Ionized 1.15 1.1 - 1.33 mmol/L   Phosphorus   Result Value Ref Range    Phosphorus 3.3 2.5 - 4.9 mg/dL   Magnesium   Result Value Ref Range    Magnesium 2.08 1.60 - 2.40 mg/dL   Lactate   Result Value Ref Range    Lactate 0.8 0.4 - 2.0 mmol/L   Hepatic function panel   Result Value Ref Range    Albumin 3.2 (L) 3.4 - 5.0 g/dL    Bilirubin, Total 0.5 0.0 - 1.2 mg/dL    Bilirubin, Direct 0.1 0.0 - 0.3 mg/dL    Alkaline  Phosphatase 76 33 - 136 U/L    ALT 48 10 - 52 U/L    AST 77 (H) 9 - 39 U/L    Total Protein 6.0 (L) 6.4 - 8.2 g/dL   Urinalysis with Reflex Microscopic   Result Value Ref Range    Color, Urine Yellow Straw, Yellow    Appearance, Urine Clear Clear    Specific Gravity, Urine 1.011 1.005 - 1.035    pH, Urine 7.0 5.0, 5.5, 6.0, 6.5, 7.0, 7.5, 8.0    Protein, Urine NEGATIVE NEGATIVE mg/dL    Glucose, Urine NEGATIVE NEGATIVE mg/dL    Blood, Urine NEGATIVE NEGATIVE    Ketones, Urine NEGATIVE NEGATIVE mg/dL    Bilirubin, Urine NEGATIVE NEGATIVE    Urobilinogen, Urine <2.0 <2.0 mg/dL    Nitrite, Urine NEGATIVE NEGATIVE    Leukocyte Esterase, Urine NEGATIVE NEGATIVE   POCT GLUCOSE   Result Value Ref Range    POCT Glucose 161 (H) 74 - 99 mg/dL   Urinalysis with Reflex Culture and Microscopic   Result Value Ref Range    Color, Urine Yellow Straw, Yellow    Appearance, Urine Clear Clear    Specific Gravity, Urine 1.013 1.005 - 1.035    pH, Urine 7.0 5.0, 5.5, 6.0, 6.5, 7.0, 7.5, 8.0    Protein, Urine NEGATIVE NEGATIVE mg/dL    Glucose, Urine NEGATIVE NEGATIVE mg/dL    Blood, Urine NEGATIVE NEGATIVE    Ketones, Urine NEGATIVE NEGATIVE mg/dL    Bilirubin, Urine NEGATIVE NEGATIVE    Urobilinogen, Urine <2.0 <2.0 mg/dL    Nitrite, Urine NEGATIVE NEGATIVE    Leukocyte Esterase, Urine NEGATIVE NEGATIVE   Amylase   Result Value Ref Range    Amylase 90 29 - 103 U/L   Lipase   Result Value Ref Range    Lipase 26 9 - 82 U/L   Coagulation Screen   Result Value Ref Range    Protime 13.5 (H) 9.8 - 12.8 seconds    INR 1.2 (H) 0.9 - 1.1    aPTT 33 27 - 38 seconds   POCT GLUCOSE   Result Value Ref Range    POCT Glucose 164 (H) 74 - 99 mg/dL      Continuous medications  dexmedeTOMIDine, 0.1-1.5 mcg/kg/hr, Last Rate: Stopped (04/03/24 0900)      PRN medications  PRN medications: acetaminophen **OR** acetaminophen, dextrose, dextrose, glucagon, ipratropium-albuteroL, magnesium sulfate, magnesium sulfate, oxygen, potassium chloride CR **OR**  potassium chloride, potassium chloride            This patient currently has cardiac telemetry ordered; if you would like to modify or discontinue the telemetry order, click here to go to the orders activity to modify/discontinue the order.      This patient is intubated   Reason for patient to remain intubated today? they are unable to protect their airway and they have inadequate gas-exchange without positive pressure             Assessment/Plan   Principal Problem:    Cardiac arrest (CMS/[facility])  Active Problems:    COPD exacerbation (CMS/[facility])    HTN (hypertension)    Hyperlipidemia    Class 3 severe obesity due to excess calories with body mass index (BMI) of 40.0 to 44.9 in adult (CMS/[facility])    Parkinson disease (CMS/[facility])    Type 2 diabetes mellitus (CMS/[facility])    Acute hypoxic respiratory failure (CMS/[facility])    To go to OR today for DCD.       I spent 32 minutes in the professional and overall care of this patient.      Antoine Angel, DO

## 2024-04-06 NOTE — CARE PLAN
The clinical goals for the shift include Patient will remain hemodyamically stable throughout the shift      Problem: Pain  Goal: My pain/discomfort is manageable  Outcome: Progressing     Problem: Psychosocial Needs  Goal: Demonstrates ability to cope with hospitalization/illness  Outcome: Progressing  Goal: Collaborate with me, my family, and caregiver to identify my specific goals  Outcome: Progressing     Problem: Discharge Barriers  Goal: My discharge needs are met  Outcome: Progressing     Problem: Skin  Goal: Participates in plan/prevention/treatment measures  4/6/2024 0937 by Pao Dawkins RN  Outcome: Progressing  4/6/2024 0936 by Pao Dawkins RN  Flowsheets (Taken 4/6/2024 0936)  Participates in plan/prevention/treatment measures:   Discuss with provider PT/OT consult   Elevate heels  Goal: Prevent/manage excess moisture  4/6/2024 0937 by Pao Dawkins RN  Outcome: Progressing  4/6/2024 0936 by Pao Dawkins RN  Flowsheets (Taken 4/6/2024 0936)  Prevent/manage excess moisture:   Cleanse incontinence/protect with barrier cream   Moisturize dry skin   Use wicking fabric (obtain order)   Follow provider orders for dressing changes   Monitor for/manage infection if present  Goal: Prevent/minimize sheer/friction injuries  4/6/2024 0937 by Pao Dawkins RN  Outcome: Progressing  4/6/2024 0936 by Pao Dawkins RN  Flowsheets (Taken 4/6/2024 0936)  Prevent/minimize sheer/friction injuries:   Complete micro-shifts as needed if patient unable. Adjust patient position to relieve pressure points, not a full turn   Increase activity/out of bed for meals   Use pull sheet   HOB 30 degrees or less   Turn/reposition every 2 hours/use positioning/transfer devices  Goal: Promote/optimize nutrition  4/6/2024 0937 by Pao Dawkins RN  Outcome: Progressing  4/6/2024 0936 by Pao Dawkins RN  Flowsheets (Taken 4/6/2024 0936)  Promote/optimize nutrition:    Assist with feeding   Monitor/record intake including meals  Goal: Promote skin healing  4/6/2024 0937 by Pao Dawkins RN  Outcome: Progressing  4/6/2024 0936 by Pao Dawkins, RN  Flowsheets (Taken 4/6/2024 0936)  Promote skin healing:   Assess skin/pad under line(s)/device(s)   Protective dressings over bony prominences   Turn/reposition every 2 hours/use positioning/transfer devices   Ensure correct size (line/device) and apply per  instructions   Rotate device position/do not position patient on device     Problem: Discharge Planning  Goal: Discharge to home or other facility with appropriate resources  Outcome: Progressing     Problem: Chronic Conditions and Co-morbidities  Goal: Patient's chronic conditions and co-morbidity symptoms are monitored and maintained or improved  Outcome: Progressing     Problem: Diabetes  Goal: Achieve decreasing blood glucose levels by end of shift  Outcome: Progressing  Goal: Increase stability of blood glucose readings by end of shift  Outcome: Progressing  Goal: Decrease in ketones present in urine by end of shift  Outcome: Progressing  Goal: Maintain electrolyte levels within acceptable range throughout shift  Outcome: Progressing  Goal: Maintain glucose levels >70mg/dl to <250mg/dl throughout shift  Outcome: Progressing  Goal: No changes in neurological exam by end of shift  Outcome: Progressing  Goal: Learn about and adhere to nutrition recommendations by end of shift  Outcome: Progressing  Goal: Vital signs within normal range for age by end of shift  Outcome: Progressing  Goal: Increase self care and/or family involovement by end of shift  Outcome: Progressing  Goal: Receive DSME education by end of shift  Outcome: Progressing     Problem: Mechanical Ventilation  Goal: Tracheostomy will be managed safely  Outcome: Progressing  Goal: Ability to express needs and understand communication  Outcome: Progressing  Goal: Mobility/activity is  maintained at optimum level for patient  Outcome: Progressing

## 2024-04-06 NOTE — SIGNIFICANT EVENT
I escorted Mr. Albarran to the OR for donation after cardiac death.  Family had signed consents.  We entered the OR at 1311.  Comfort measures had already been initiated.    Time out was performed at 1327.  After family was brought to his side, he was extubated at 1334.  He was suctioned for comfort.  At 1342, the waveform on his arterial line was lost, and he had no pulse or spontaneous respirations.  His rhythm was PEA.  He was comfortable throughout the process.  We observed him for another 5 minutes, and he was declared dead at 1348.  Care was then transferred to the transplant team.      Antoine Angel,   Critical Care and Emergency Medicine

## 2024-04-06 NOTE — DISCHARGE SUMMARY
Discharge Diagnosis  Cardiac arrest (CMS/HCC)    Issues Requiring Follow-Up  none    Test Results Pending At Discharge  Pending Labs       No current pending labs.            Hospital Course   He presented to the ED on 2024  in cardiopulmonary arrest.  It appeared he had a hypoxia-induced cardiopulmonary arrest.  He required several rounds of ACLS protocol before ROSC was achieved.  He was cooled and brought to the ICU for CCM management.  Neurology was consulted.  Despite ongoing efforts, his neurological status never improved  His family agreed to pursue [employer] donation.  [employer] was involved, and on 2024, he was taken to the OR for procurement.  He subsequently was extubated and he  at 1348 on 2024.    Pertinent Physical Exam At Time of Discharge  Physical Exam  Unresponsive to verbal or noxious stimuli.  Home Medications     Medication List      ASK your doctor about these medications     Advair Diskus 500-50 mcg/dose diskus inhaler; Generic drug: fluticasone   propion-salmeteroL   albuterol 90 mcg/actuation inhaler   amLODIPine 10 mg tablet; Commonly known as: Norvasc   atenolol 100 mg tablet; Commonly known as: Tenormin   atorvastatin 10 mg tablet; Commonly known as: Lipitor   azithromycin 250 mg tablet; Commonly known as: Zithromax; Take 2 tabs   (500 mg) by mouth today, than 1 tab (250 mg) daily for 4 days.; Ask about:   Should I take this medication?   buPROPion  mg 12 hr tablet; Commonly known as: Wellbutrin SR   * carbidopa-levodopa  mg ER tablet; Commonly known as: Sinemet CR;   Take 1 tablet by mouth once daily at bedtime. Do not crush, chew, or   split.   * carbidopa-levodopa  mg tablet; Commonly known as: Sinemet; TAKE   1 TABLET BY MOUTH 3 TIMES DAILY   citalopram 40 mg tablet; Commonly known as: CeleXA   doxycycline 100 mg capsule; Commonly known as: Vibramycin; Take 1   capsule (100 mg) by mouth 2 times a day for 5 days. Take with at least 8   ounces (large  glass) of water, do not lie down for 30 minutes after; Ask   about: Should I take this medication?   ergocalciferol 1.25 MG (99282 UT) capsule; Commonly known as: Vitamin   D-2   famotidine 40 mg tablet; Commonly known as: Pepcid   furosemide 40 mg tablet; Commonly known as: Lasix   gabapentin 800 mg tablet; Commonly known as: Neurontin   glipiZIDE 10 mg tablet; Commonly known as: Glucotrol   Januvia 100 mg tablet; Generic drug: SITagliptin phosphate   ketoconazole 2 % shampoo; Commonly known as: NIZOral; Apply topically   every other day.   losartan 100 mg tablet; Commonly known as: Cozaar   melatonin 3 mg tablet   metFORMIN 1,000 mg tablet; Commonly known as: Glucophage   methylPREDNISolone 4 mg tablets; Commonly known as: Medrol Dospak; Take   as directed on package.   montelukast 10 mg tablet; Commonly known as: Singulair   oxygen gas therapy; Commonly known as: O2; Inhale 1 each once every 24   hours.   Ozempic 1 mg/dose (4 mg/3 mL) pen injector; Generic drug: semaglutide   sildenafil 100 mg tablet; Commonly known as: Viagra  * This list has 2 medication(s) that are the same as other medications   prescribed for you. Read the directions carefully, and ask your doctor or   other care provider to review them with you.       Outpatient Follow-Up  Future Appointments   Date Time Provider Department Center   5/22/2024 11:30 AM [facility] [id number] Eran Angel DO

## 2024-04-07 LAB — GLUCOSE BLD MANUAL STRIP-MCNC: 212 MG/DL (ref 74–99)

## 2024-04-16 ENCOUNTER — APPOINTMENT (OUTPATIENT)
Dept: PHYSICAL THERAPY | Facility: CLINIC | Age: 64
End: 2024-04-16
Payer: COMMERCIAL

## 2024-04-18 ENCOUNTER — APPOINTMENT (OUTPATIENT)
Dept: PHYSICAL THERAPY | Facility: CLINIC | Age: 64
End: 2024-04-18
Payer: COMMERCIAL

## 2024-04-23 ENCOUNTER — APPOINTMENT (OUTPATIENT)
Dept: PHYSICAL THERAPY | Facility: CLINIC | Age: 64
End: 2024-04-23
Payer: COMMERCIAL

## 2024-04-25 ENCOUNTER — APPOINTMENT (OUTPATIENT)
Dept: PHYSICAL THERAPY | Facility: CLINIC | Age: 64
End: 2024-04-25
Payer: COMMERCIAL

## 2024-05-01 LAB
ATRIAL RATE: 74 BPM
P AXIS: 28 DEGREES
P OFFSET: 190 MS
P ONSET: 124 MS
PR INTERVAL: 178 MS
Q ONSET: 213 MS
QRS COUNT: 12 BEATS
QRS DURATION: 164 MS
QT INTERVAL: 478 MS
QTC CALCULATION(BAZETT): 530 MS
QTC FREDERICIA: 513 MS
R AXIS: -21 DEGREES
T AXIS: 32 DEGREES
T OFFSET: 452 MS
VENTRICULAR RATE: 74 BPM

## 2024-05-02 LAB
ATRIAL RATE: 59 BPM
P AXIS: 40 DEGREES
P OFFSET: 196 MS
P ONSET: 127 MS
PR INTERVAL: 166 MS
Q ONSET: 210 MS
QRS COUNT: 10 BEATS
QRS DURATION: 160 MS
QT INTERVAL: 534 MS
QTC CALCULATION(BAZETT): 528 MS
QTC FREDERICIA: 531 MS
R AXIS: -23 DEGREES
T AXIS: 32 DEGREES
T OFFSET: 477 MS
VENTRICULAR RATE: 59 BPM
